# Patient Record
Sex: MALE | Race: WHITE | NOT HISPANIC OR LATINO
[De-identification: names, ages, dates, MRNs, and addresses within clinical notes are randomized per-mention and may not be internally consistent; named-entity substitution may affect disease eponyms.]

---

## 2014-06-26 RX ORDER — DORZOLAMIDE HYDROCHLORIDE TIMOLOL MALEATE 20; 5 MG/ML; MG/ML
1 SOLUTION/ DROPS OPHTHALMIC
Qty: 0 | Refills: 0 | COMMUNITY
Start: 2014-06-26

## 2014-06-26 RX ORDER — LATANOPROST 0.05 MG/ML
1 SOLUTION/ DROPS OPHTHALMIC; TOPICAL
Qty: 0 | Refills: 0 | COMMUNITY
Start: 2014-06-26

## 2017-01-24 ENCOUNTER — NON-APPOINTMENT (OUTPATIENT)
Age: 77
End: 2017-01-24

## 2017-01-24 ENCOUNTER — APPOINTMENT (OUTPATIENT)
Dept: ELECTROPHYSIOLOGY | Facility: CLINIC | Age: 77
End: 2017-01-24

## 2017-01-24 VITALS
SYSTOLIC BLOOD PRESSURE: 133 MMHG | WEIGHT: 152 LBS | DIASTOLIC BLOOD PRESSURE: 71 MMHG | HEIGHT: 69 IN | BODY MASS INDEX: 22.51 KG/M2

## 2017-01-26 ENCOUNTER — INPATIENT (INPATIENT)
Facility: HOSPITAL | Age: 77
LOS: 6 days | Discharge: HOME | End: 2017-02-02
Attending: HOSPITALIST | Admitting: HOSPITALIST
Payer: MEDICARE

## 2017-01-26 DIAGNOSIS — Z95.810 PRESENCE OF AUTOMATIC (IMPLANTABLE) CARDIAC DEFIBRILLATOR: Chronic | ICD-10-CM

## 2017-01-26 PROCEDURE — 71010: CPT | Mod: 26

## 2017-01-26 PROCEDURE — 73660 X-RAY EXAM OF TOE(S): CPT | Mod: 26,RT

## 2017-01-26 PROCEDURE — 93010 ELECTROCARDIOGRAM REPORT: CPT

## 2017-01-26 PROCEDURE — 99285 EMERGENCY DEPT VISIT HI MDM: CPT

## 2017-01-27 PROCEDURE — 73630 X-RAY EXAM OF FOOT: CPT | Mod: 26,LT

## 2017-01-28 VITALS
DIASTOLIC BLOOD PRESSURE: 60 MMHG | TEMPERATURE: 97 F | SYSTOLIC BLOOD PRESSURE: 123 MMHG | OXYGEN SATURATION: 98 % | WEIGHT: 169.76 LBS | RESPIRATION RATE: 18 BRPM | HEART RATE: 70 BPM

## 2017-01-28 LAB
ANION GAP SERPL CALC-SCNC: 9 MMOL/L — SIGNIFICANT CHANGE UP (ref 5–17)
BUN SERPL-MCNC: 35 MG/DL — HIGH (ref 7–23)
CALCIUM SERPL-MCNC: 8.8 MG/DL — SIGNIFICANT CHANGE UP (ref 8.5–10.1)
CHLORIDE SERPL-SCNC: 108 MMOL/L — SIGNIFICANT CHANGE UP (ref 96–108)
CO2 SERPL-SCNC: 22 MMOL/L — SIGNIFICANT CHANGE UP (ref 22–31)
CREAT SERPL-MCNC: 1.83 MG/DL — HIGH (ref 0.5–1.3)
CULTURE RESULTS: SIGNIFICANT CHANGE UP
GLUCOSE SERPL-MCNC: 124 MG/DL — HIGH (ref 70–99)
HCT VFR BLD CALC: 33.8 % — LOW (ref 39–50)
HGB BLD-MCNC: 11.6 G/DL — LOW (ref 13–17)
INR BLD: 2.33 RATIO — HIGH (ref 0.88–1.16)
MCHC RBC-ENTMCNC: 30.9 PG — SIGNIFICANT CHANGE UP (ref 27–34)
MCHC RBC-ENTMCNC: 34.3 GM/DL — SIGNIFICANT CHANGE UP (ref 32–36)
MCV RBC AUTO: 90 FL — SIGNIFICANT CHANGE UP (ref 80–100)
PLATELET # BLD AUTO: 106 K/UL — LOW (ref 150–400)
POTASSIUM SERPL-MCNC: 4.1 MMOL/L — SIGNIFICANT CHANGE UP (ref 3.5–5.3)
POTASSIUM SERPL-SCNC: 4.1 MMOL/L — SIGNIFICANT CHANGE UP (ref 3.5–5.3)
PROTHROM AB SERPL-ACNC: 25.8 SEC — HIGH (ref 10–13.1)
RBC # BLD: 3.75 M/UL — LOW (ref 4.2–5.8)
RBC # FLD: 14.7 % — HIGH (ref 10.3–14.5)
SODIUM SERPL-SCNC: 139 MMOL/L — SIGNIFICANT CHANGE UP (ref 135–145)
SPECIMEN SOURCE: SIGNIFICANT CHANGE UP
WBC # BLD: 9.5 K/UL — SIGNIFICANT CHANGE UP (ref 3.8–10.5)
WBC # FLD AUTO: 9.5 K/UL — SIGNIFICANT CHANGE UP (ref 3.8–10.5)

## 2017-01-28 RX ORDER — ALLOPURINOL 300 MG
100 TABLET ORAL
Qty: 0 | Refills: 0 | Status: DISCONTINUED | OUTPATIENT
Start: 2017-01-28 | End: 2017-02-02

## 2017-01-28 RX ORDER — WARFARIN SODIUM 2.5 MG/1
5 TABLET ORAL
Qty: 0 | Refills: 0 | Status: DISCONTINUED | OUTPATIENT
Start: 2017-01-28 | End: 2017-01-28

## 2017-01-28 RX ORDER — ONDANSETRON 8 MG/1
4 TABLET, FILM COATED ORAL EVERY 6 HOURS
Qty: 0 | Refills: 0 | Status: DISCONTINUED | OUTPATIENT
Start: 2017-01-28 | End: 2017-02-02

## 2017-01-28 RX ORDER — ATORVASTATIN CALCIUM 80 MG/1
5 TABLET, FILM COATED ORAL AT BEDTIME
Qty: 0 | Refills: 0 | Status: DISCONTINUED | OUTPATIENT
Start: 2017-01-28 | End: 2017-02-02

## 2017-01-28 RX ORDER — INSULIN LISPRO 100/ML
VIAL (ML) SUBCUTANEOUS
Qty: 0 | Refills: 0 | Status: DISCONTINUED | OUTPATIENT
Start: 2017-01-28 | End: 2017-02-02

## 2017-01-28 RX ORDER — CHOLECALCIFEROL (VITAMIN D3) 125 MCG
2000 CAPSULE ORAL DAILY
Qty: 0 | Refills: 0 | Status: DISCONTINUED | OUTPATIENT
Start: 2017-01-28 | End: 2017-02-02

## 2017-01-28 RX ORDER — DORZOLAMIDE HYDROCHLORIDE TIMOLOL MALEATE 20; 5 MG/ML; MG/ML
1 SOLUTION/ DROPS OPHTHALMIC EVERY 12 HOURS
Qty: 0 | Refills: 0 | Status: DISCONTINUED | OUTPATIENT
Start: 2017-01-28 | End: 2017-02-02

## 2017-01-28 RX ORDER — DEXTROSE 50 % IN WATER 50 %
25 SYRINGE (ML) INTRAVENOUS ONCE
Qty: 0 | Refills: 0 | Status: DISCONTINUED | OUTPATIENT
Start: 2017-01-28 | End: 2017-02-02

## 2017-01-28 RX ORDER — WARFARIN SODIUM 2.5 MG/1
2.5 TABLET ORAL
Qty: 0 | Refills: 0 | Status: DISCONTINUED | OUTPATIENT
Start: 2017-01-28 | End: 2017-01-28

## 2017-01-28 RX ORDER — ACETAMINOPHEN 500 MG
650 TABLET ORAL EVERY 6 HOURS
Qty: 0 | Refills: 0 | Status: DISCONTINUED | OUTPATIENT
Start: 2017-01-28 | End: 2017-01-28

## 2017-01-28 RX ORDER — CARVEDILOL PHOSPHATE 80 MG/1
25 CAPSULE, EXTENDED RELEASE ORAL EVERY 12 HOURS
Qty: 0 | Refills: 0 | Status: DISCONTINUED | OUTPATIENT
Start: 2017-01-28 | End: 2017-02-02

## 2017-01-28 RX ORDER — OXYCODONE HYDROCHLORIDE 5 MG/1
5 TABLET ORAL EVERY 4 HOURS
Qty: 0 | Refills: 0 | Status: DISCONTINUED | OUTPATIENT
Start: 2017-01-28 | End: 2017-02-02

## 2017-01-28 RX ORDER — DEXTROSE 50 % IN WATER 50 %
12.5 SYRINGE (ML) INTRAVENOUS ONCE
Qty: 0 | Refills: 0 | Status: DISCONTINUED | OUTPATIENT
Start: 2017-01-28 | End: 2017-02-02

## 2017-01-28 RX ORDER — MORPHINE SULFATE 50 MG/1
4 CAPSULE, EXTENDED RELEASE ORAL
Qty: 0 | Refills: 0 | Status: DISCONTINUED | OUTPATIENT
Start: 2017-01-28 | End: 2017-02-02

## 2017-01-28 RX ORDER — ACETYLCYSTEINE 200 MG/ML
1200 VIAL (ML) MISCELLANEOUS EVERY 12 HOURS
Qty: 0 | Refills: 0 | Status: DISCONTINUED | OUTPATIENT
Start: 2017-01-28 | End: 2017-01-28

## 2017-01-28 RX ORDER — MEXILETINE HYDROCHLORIDE 150 MG/1
150 CAPSULE ORAL EVERY 12 HOURS
Qty: 0 | Refills: 0 | Status: DISCONTINUED | OUTPATIENT
Start: 2017-01-28 | End: 2017-02-02

## 2017-01-28 RX ORDER — FUROSEMIDE 40 MG
40 TABLET ORAL DAILY
Qty: 0 | Refills: 0 | Status: DISCONTINUED | OUTPATIENT
Start: 2017-01-28 | End: 2017-01-28

## 2017-01-28 RX ORDER — PANTOPRAZOLE SODIUM 20 MG/1
40 TABLET, DELAYED RELEASE ORAL
Qty: 0 | Refills: 0 | Status: DISCONTINUED | OUTPATIENT
Start: 2017-01-28 | End: 2017-02-02

## 2017-01-28 RX ORDER — LATANOPROST 0.05 MG/ML
1 SOLUTION/ DROPS OPHTHALMIC; TOPICAL AT BEDTIME
Qty: 0 | Refills: 0 | Status: DISCONTINUED | OUTPATIENT
Start: 2017-01-28 | End: 2017-02-02

## 2017-01-28 RX ORDER — VANCOMYCIN HCL 1 G
500 VIAL (EA) INTRAVENOUS EVERY 12 HOURS
Qty: 0 | Refills: 0 | Status: DISCONTINUED | OUTPATIENT
Start: 2017-01-28 | End: 2017-01-28

## 2017-01-28 RX ORDER — LISINOPRIL 2.5 MG/1
5 TABLET ORAL AT BEDTIME
Qty: 0 | Refills: 0 | Status: DISCONTINUED | OUTPATIENT
Start: 2017-01-28 | End: 2017-02-02

## 2017-01-28 RX ORDER — SODIUM CHLORIDE 9 MG/ML
1000 INJECTION, SOLUTION INTRAVENOUS
Qty: 0 | Refills: 0 | Status: DISCONTINUED | OUTPATIENT
Start: 2017-01-28 | End: 2017-02-02

## 2017-01-28 RX ORDER — SODIUM CHLORIDE 9 MG/ML
1000 INJECTION INTRAMUSCULAR; INTRAVENOUS; SUBCUTANEOUS
Qty: 0 | Refills: 0 | Status: DISCONTINUED | OUTPATIENT
Start: 2017-01-31 | End: 2017-01-31

## 2017-01-28 RX ORDER — ACETAMINOPHEN 500 MG
650 TABLET ORAL EVERY 6 HOURS
Qty: 0 | Refills: 0 | Status: DISCONTINUED | OUTPATIENT
Start: 2017-01-28 | End: 2017-02-02

## 2017-01-28 RX ORDER — GLUCAGON INJECTION, SOLUTION 0.5 MG/.1ML
1 INJECTION, SOLUTION SUBCUTANEOUS ONCE
Qty: 0 | Refills: 0 | Status: DISCONTINUED | OUTPATIENT
Start: 2017-01-28 | End: 2017-02-02

## 2017-01-28 RX ORDER — DEXTROSE 50 % IN WATER 50 %
1 SYRINGE (ML) INTRAVENOUS ONCE
Qty: 0 | Refills: 0 | Status: DISCONTINUED | OUTPATIENT
Start: 2017-01-28 | End: 2017-02-02

## 2017-01-28 RX ORDER — PIPERACILLIN AND TAZOBACTAM 4; .5 G/20ML; G/20ML
3.38 INJECTION, POWDER, LYOPHILIZED, FOR SOLUTION INTRAVENOUS EVERY 8 HOURS
Qty: 0 | Refills: 0 | Status: DISCONTINUED | OUTPATIENT
Start: 2017-01-28 | End: 2017-02-01

## 2017-01-28 RX ORDER — ASPIRIN/CALCIUM CARB/MAGNESIUM 324 MG
81 TABLET ORAL DAILY
Qty: 0 | Refills: 0 | Status: DISCONTINUED | OUTPATIENT
Start: 2017-01-28 | End: 2017-02-02

## 2017-01-28 RX ORDER — ACETYLCYSTEINE 200 MG/ML
1200 VIAL (ML) MISCELLANEOUS EVERY 12 HOURS
Qty: 0 | Refills: 0 | Status: DISCONTINUED | OUTPATIENT
Start: 2017-01-30 | End: 2017-02-02

## 2017-01-28 RX ORDER — DIGOXIN 250 MCG
0.12 TABLET ORAL AT BEDTIME
Qty: 0 | Refills: 0 | Status: DISCONTINUED | OUTPATIENT
Start: 2017-01-28 | End: 2017-02-02

## 2017-01-28 RX ORDER — VANCOMYCIN HCL 1 G
500 VIAL (EA) INTRAVENOUS EVERY 12 HOURS
Qty: 0 | Refills: 0 | Status: DISCONTINUED | OUTPATIENT
Start: 2017-01-28 | End: 2017-01-30

## 2017-01-28 RX ADMIN — DORZOLAMIDE HYDROCHLORIDE TIMOLOL MALEATE 1 DROP(S): 20; 5 SOLUTION/ DROPS OPHTHALMIC at 21:18

## 2017-01-28 RX ADMIN — Medication 1: at 18:33

## 2017-01-28 RX ADMIN — PIPERACILLIN AND TAZOBACTAM 25 GRAM(S): 4; .5 INJECTION, POWDER, LYOPHILIZED, FOR SOLUTION INTRAVENOUS at 22:27

## 2017-01-28 RX ADMIN — MEXILETINE HYDROCHLORIDE 150 MILLIGRAM(S): 150 CAPSULE ORAL at 10:30

## 2017-01-28 RX ADMIN — LATANOPROST 1 DROP(S): 0.05 SOLUTION/ DROPS OPHTHALMIC; TOPICAL at 21:18

## 2017-01-28 RX ADMIN — Medication 100 MILLIGRAM(S): at 21:14

## 2017-01-28 RX ADMIN — Medication 100 MILLIGRAM(S): at 21:19

## 2017-01-28 RX ADMIN — MEXILETINE HYDROCHLORIDE 150 MILLIGRAM(S): 150 CAPSULE ORAL at 21:19

## 2017-01-28 RX ADMIN — ATORVASTATIN CALCIUM 5 MILLIGRAM(S): 80 TABLET, FILM COATED ORAL at 21:17

## 2017-01-28 RX ADMIN — Medication 0.12 MILLIGRAM(S): at 21:17

## 2017-01-28 RX ADMIN — Medication 100 MILLIGRAM(S): at 12:00

## 2017-01-28 RX ADMIN — CARVEDILOL PHOSPHATE 25 MILLIGRAM(S): 80 CAPSULE, EXTENDED RELEASE ORAL at 21:17

## 2017-01-28 RX ADMIN — PIPERACILLIN AND TAZOBACTAM 25 GRAM(S): 4; .5 INJECTION, POWDER, LYOPHILIZED, FOR SOLUTION INTRAVENOUS at 15:00

## 2017-01-28 RX ADMIN — Medication 81 MILLIGRAM(S): at 18:32

## 2017-01-28 NOTE — PROGRESS NOTE ADULT - ASSESSMENT
76 y.o. male with PMH VT s/p AICD, AFib on AC, CAD s/p CABG, DM2, gout, glaucoma, HLD, chronic systolic CHF, PVD, CKD 2-3 presents with R big toe infection.   #R big toe infection, cellulitis, r/o OM   -right foot xray neg for fx or dislocation   -f/u cultures, including wound culture   -cont zosyn and vanco   wbc is 9.5  -f/u bone scan   - pt scheduled for angiogram with vascular surgery on tuesday  -vascular surgery input appreciated   -podiatry input appreciated   #AFib on coumadin   -cont coumadin and monitor INR - 2.33  -check digoxin level   #gout   -cont allopurinol   #CAD s/p CABG   #VT s/p AICD   -continue asa, statin, BB, ACEI   #chronic systolic CHF   -echo (6/29/13): EF 35-40% mod-sev MR, mod-sev TR, severe pulm HTN   -stable   -i/o, daily weight   - lasix 40 mg PO   #hyperglycemia, DM2   -fingerstick monitoring and ISS   -A1C 7.2   case discussed with Dr Taylor   DVT Prophylaxis: :: Coumadin 76 y.o. male with PMH VT s/p AICD, AFib on AC, CAD s/p CABG, DM2, gout, glaucoma, HLD, chronic systolic CHF, PVD, CKD 2-3 presents with R big toe infection.   #R big toe infection, cellulitis, r/o OM   -right foot xray neg for fx or dislocation   -f/u cultures, including wound culture   -cont zosyn and vanco   wbc is 9.5  - bone scan negative for osteo  - pt scheduled for angiogram with vascular surgery on tuesday  -vascular surgery input appreciated   -podiatry input appreciated   #AFib on coumadin   -cont coumadin and monitor INR - 2.33  -check digoxin level   #gout   -cont allopurinol   #CAD s/p CABG   #VT s/p AICD   -continue asa, statin, BB, ACEI   #chronic systolic CHF   -echo (6/29/13): EF 35-40% mod-sev MR, mod-sev TR, severe pulm HTN   -stable   -i/o, daily weight   - lasix 40 mg PO   #hyperglycemia, DM2   -fingerstick monitoring and ISS   -A1C 7.2   case discussed with Dr Taylor   DVT Prophylaxis: :: Coumadin

## 2017-01-28 NOTE — CHART NOTE - NSCHARTNOTEFT_GEN_A_CORE
Patient seen and examined with Dr. Beharry and Melissa on the [facility]  Teaching Service. Agree with history, physical, labs and plan which were reviewed in detail.    Patient is a 76 y.o. 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  Right great toe infection / cellulitis rule out osteomyelitis    1/27  -zosyn/vanco  A-fib  -continue coumadin  [place] Systolic CHF  -2-d echo 6/2013 EF 35-40%, severe pulm HTN  type 2 DM  -follow HgA1c  -ISS    1/28  Patient is to have bone scan completed today and angiogram completed on 1/31.  continue vancomycin and zosyn.

## 2017-01-28 NOTE — PROGRESS NOTE ADULT - SUBJECTIVE AND OBJECTIVE BOX
Patient is a 76y old  Male who presents with a chief complaint of right jae infection      HPI:76 y.o. male with PMH VT s/p AICD, AFib on AC, CAD s/p CABG, DM2, gout, glaucoma, HLD, chronic systolic CHF, PVD, CKD 2-3 presents with R big toe infection. Pt states the toe infection has been going on for the past 6 weeks, but recently has been worse. States he took doxycycline with initial improvement, but then back worse. States currently, having oozing discharge from the infection. States occasionally sharp throbbing pain from that toe. Denies any fever, chills, CP, SOB, abd pain. States intact sensation. Pt was sent in for IV antibiotics by podiatrist.   1/27/17: pt seen and examined at bedside. No current pain, fever, N/V. Pt updated of course of action.  1/28/17: Pt seen and examined at bedside. No complains. Scheduled for bone scan today and angiogram with Co2 contrast on tuesday with vascular surgery        T(C): 36.3, Max: 36.3 (01-28 @ 12:14)  HR: 70 (70 - 70)  BP: 123/60 (123/60 - 123/60)  RR: 18 (18 - 18)  SpO2: 98% (98% - 98%)  Wt(kg): --    MEDICATIONS  (STANDING):  allopurinol 100milliGRAM(s) Oral after dinner  aspirin  chewable 81milliGRAM(s) Oral daily  atorvastatin 5milliGRAM(s) Oral at bedtime  carvedilol 25milliGRAM(s) Oral every 12 hours  cholecalciferol 2000Unit(s) Oral daily  digoxin     Tablet 0.125milliGRAM(s) Oral at bedtime  dorzolamide 2%/timolol 0.5% Ophthalmic Solution 1Drop(s) Left EYE every 12 hours  insulin lispro (HumaLOG) corrective regimen sliding scale  SubCutaneous three times a day before meals  dextrose 5%. 1000milliLiter(s) IV Continuous <Continuous>  dextrose 50% Injectable 12.5Gram(s) IV Push once  dextrose 50% Injectable 25Gram(s) IV Push once  dextrose 50% Injectable 25Gram(s) IV Push once  latanoprost 0.005% Ophthalmic Solution 1Drop(s) Left EYE at bedtime  mexiletine 150milliGRAM(s) Oral every 12 hours  pantoprazole    Tablet 40milliGRAM(s) Oral before breakfast  piperacillin/tazobactam IVPB. 3.375Gram(s) IV Intermittent every 8 hours  lisinopril 5milliGRAM(s) Oral at bedtime  vancomycin  IVPB 500milliGRAM(s) IV Intermittent every 12 hours    MEDICATIONS  (PRN):  dextrose Gel 1Dose(s) Oral once PRN Blood Glucose LESS THAN 70 milliGRAM(s)/deciliter  glucagon  Injectable 1milliGRAM(s) IntraMuscular once PRN Glucose LESS THAN 70 milligrams/deciliter  aluminum hydroxide/magnesium hydroxide/simethicone Suspension 30milliLiter(s) Oral every 6 hours PRN Dyspepsia  morphine  - Injectable 4milliGRAM(s) IV Push every 3 hours PRN Severe Pain (7 - 10)  ondansetron Injectable 4milliGRAM(s) IV Push every 6 hours PRN Nausea and/or Vomiting  oxyCODONE IR 5milliGRAM(s) Oral every 4 hours PRN Moderate Pain (4 - 6)  acetaminophen   Tablet. 650milliGRAM(s) Oral every 6 hours PRN Mild Pain (1 - 3)      LABS:                        11.6   9.5   )-----------( 106      ( 28 Jan 2017 05:05 )             33.8     28 Jan 2017 05:05    139    |  108    |  35     ----------------------------<  124    4.1     |  22     |  1.83     Ca    8.8        28 Jan 2017 05:05      PT/INR - ( 28 Jan 2017 05:05 )   PT: 25.8 sec;   INR: 2.33 ratio                       RECENT CULTURES:      RADIOLOGY & ADDITIONAL TESTS:      PHYSICAL EXAM:    GENERAL: NAD, well-groomed, well-developed  HEAD:  Atraumatic, Normocephalic  EYES: EOMI, PERRLA, conjunctiva and sclera clear  ENMT: Moist mucous membranes  NECK: Supple, No JVD  NERVOUS SYSTEM:  Alert & Oriented X3, Motor Strength 5/5 B/L upper and lower extremities; DTRs 2+ intact and symmetric  CHEST/LUNG: Clear to auscultation bilaterally; No rales, rhonchi, wheezing, or rubs  HEART: Regular rate and rhythm; No murmurs, rubs, or gallops  ABDOMEN: Soft, Nontender, Nondistended; Bowel sounds present  EXTREMITIES:  2+ Peripheral Pulses, No clubbing, cyanosis, or edema        allopurinol 100milliGRAM(s) Oral after dinner  aspirin  chewable 81milliGRAM(s) Oral daily  atorvastatin 5milliGRAM(s) Oral at bedtime  carvedilol 25milliGRAM(s) Oral every 12 hours  cholecalciferol 2000Unit(s) Oral daily  digoxin     Tablet 0.125milliGRAM(s) Oral at bedtime  dorzolamide 2%/timolol 0.5% Ophthalmic Solution 1Drop(s) Left EYE every 12 hours  insulin lispro (HumaLOG) corrective regimen sliding scale  SubCutaneous three times a day before meals  dextrose 5%. 1000milliLiter(s) IV Continuous <Continuous>  dextrose Gel 1Dose(s) Oral once PRN  dextrose 50% Injectable 12.5Gram(s) IV Push once  dextrose 50% Injectable 25Gram(s) IV Push once  dextrose 50% Injectable 25Gram(s) IV Push once  glucagon  Injectable 1milliGRAM(s) IntraMuscular once PRN  latanoprost 0.005% Ophthalmic Solution 1Drop(s) Left EYE at bedtime  mexiletine 150milliGRAM(s) Oral every 12 hours  pantoprazole    Tablet 40milliGRAM(s) Oral before breakfast  piperacillin/tazobactam IVPB. 3.375Gram(s) IV Intermittent every 8 hours  aluminum hydroxide/magnesium hydroxide/simethicone Suspension 30milliLiter(s) Oral every 6 hours PRN  lisinopril 5milliGRAM(s) Oral at bedtime  morphine  - Injectable 4milliGRAM(s) IV Push every 3 hours PRN  ondansetron Injectable 4milliGRAM(s) IV Push every 6 hours PRN  oxyCODONE IR 5milliGRAM(s) Oral every 4 hours PRN  vancomycin  IVPB 500milliGRAM(s) IV Intermittent every 12 hours  acetaminophen   Tablet. 650milliGRAM(s) Oral every 6 hours PRN

## 2017-01-29 RX ADMIN — Medication 100 MILLIGRAM(S): at 11:45

## 2017-01-29 RX ADMIN — DORZOLAMIDE HYDROCHLORIDE TIMOLOL MALEATE 1 DROP(S): 20; 5 SOLUTION/ DROPS OPHTHALMIC at 20:15

## 2017-01-29 RX ADMIN — LATANOPROST 1 DROP(S): 0.05 SOLUTION/ DROPS OPHTHALMIC; TOPICAL at 21:59

## 2017-01-29 RX ADMIN — Medication 2000 UNIT(S): at 11:51

## 2017-01-29 RX ADMIN — CARVEDILOL PHOSPHATE 25 MILLIGRAM(S): 80 CAPSULE, EXTENDED RELEASE ORAL at 06:58

## 2017-01-29 RX ADMIN — MEXILETINE HYDROCHLORIDE 150 MILLIGRAM(S): 150 CAPSULE ORAL at 06:59

## 2017-01-29 RX ADMIN — PIPERACILLIN AND TAZOBACTAM 25 GRAM(S): 4; .5 INJECTION, POWDER, LYOPHILIZED, FOR SOLUTION INTRAVENOUS at 22:00

## 2017-01-29 RX ADMIN — PIPERACILLIN AND TAZOBACTAM 25 GRAM(S): 4; .5 INJECTION, POWDER, LYOPHILIZED, FOR SOLUTION INTRAVENOUS at 05:42

## 2017-01-29 RX ADMIN — ATORVASTATIN CALCIUM 5 MILLIGRAM(S): 80 TABLET, FILM COATED ORAL at 21:56

## 2017-01-29 RX ADMIN — Medication 0.12 MILLIGRAM(S): at 21:58

## 2017-01-29 RX ADMIN — Medication 100 MILLIGRAM(S): at 20:13

## 2017-01-29 RX ADMIN — PANTOPRAZOLE SODIUM 40 MILLIGRAM(S): 20 TABLET, DELAYED RELEASE ORAL at 05:41

## 2017-01-29 RX ADMIN — Medication 1: at 11:48

## 2017-01-29 RX ADMIN — Medication 81 MILLIGRAM(S): at 17:23

## 2017-01-29 RX ADMIN — DORZOLAMIDE HYDROCHLORIDE TIMOLOL MALEATE 1 DROP(S): 20; 5 SOLUTION/ DROPS OPHTHALMIC at 05:41

## 2017-01-29 RX ADMIN — CARVEDILOL PHOSPHATE 25 MILLIGRAM(S): 80 CAPSULE, EXTENDED RELEASE ORAL at 20:10

## 2017-01-29 RX ADMIN — PIPERACILLIN AND TAZOBACTAM 25 GRAM(S): 4; .5 INJECTION, POWDER, LYOPHILIZED, FOR SOLUTION INTRAVENOUS at 15:10

## 2017-01-29 RX ADMIN — MEXILETINE HYDROCHLORIDE 150 MILLIGRAM(S): 150 CAPSULE ORAL at 22:08

## 2017-01-29 RX ADMIN — OXYCODONE HYDROCHLORIDE 5 MILLIGRAM(S): 5 TABLET ORAL at 17:23

## 2017-01-29 RX ADMIN — OXYCODONE HYDROCHLORIDE 5 MILLIGRAM(S): 5 TABLET ORAL at 05:42

## 2017-01-29 NOTE — PROGRESS NOTE ADULT - SUBJECTIVE AND OBJECTIVE BOX
Patient is a 76y old  Male who presents with a chief complaint of     HPI:F/U for ischemic R foot. Schedule for R angio on Tues by Dr Barbosa.. No complaints.      Allergies    No Known Allergies    Intolerances        MEDICATIONS  (STANDING):  allopurinol 100milliGRAM(s) Oral after dinner  aspirin  chewable 81milliGRAM(s) Oral daily  atorvastatin 5milliGRAM(s) Oral at bedtime  carvedilol 25milliGRAM(s) Oral every 12 hours  cholecalciferol 2000Unit(s) Oral daily  digoxin     Tablet 0.125milliGRAM(s) Oral at bedtime  dorzolamide 2%/timolol 0.5% Ophthalmic Solution 1Drop(s) Left EYE every 12 hours  insulin lispro (HumaLOG) corrective regimen sliding scale  SubCutaneous three times a day before meals  dextrose 5%. 1000milliLiter(s) IV Continuous <Continuous>  dextrose 50% Injectable 12.5Gram(s) IV Push once  dextrose 50% Injectable 25Gram(s) IV Push once  dextrose 50% Injectable 25Gram(s) IV Push once  latanoprost 0.005% Ophthalmic Solution 1Drop(s) Left EYE at bedtime  mexiletine 150milliGRAM(s) Oral every 12 hours  pantoprazole    Tablet 40milliGRAM(s) Oral before breakfast  piperacillin/tazobactam IVPB. 3.375Gram(s) IV Intermittent every 8 hours  lisinopril 5milliGRAM(s) Oral at bedtime  vancomycin  IVPB 500milliGRAM(s) IV Intermittent every 12 hours    MEDICATIONS  (PRN):  dextrose Gel 1Dose(s) Oral once PRN Blood Glucose LESS THAN 70 milliGRAM(s)/deciliter  glucagon  Injectable 1milliGRAM(s) IntraMuscular once PRN Glucose LESS THAN 70 milligrams/deciliter  aluminum hydroxide/magnesium hydroxide/simethicone Suspension 30milliLiter(s) Oral every 6 hours PRN Dyspepsia  morphine  - Injectable 4milliGRAM(s) IV Push every 3 hours PRN Severe Pain (7 - 10)  ondansetron Injectable 4milliGRAM(s) IV Push every 6 hours PRN Nausea and/or Vomiting  oxyCODONE IR 5milliGRAM(s) Oral every 4 hours PRN Moderate Pain (4 - 6)  acetaminophen   Tablet. 650milliGRAM(s) Oral every 6 hours PRN Mild Pain (1 - 3)      PHYSICAL EXAM:R Foot looks improved. No erythema.           RADIOLOGY    CBC Full  -  ( 28 Jan 2017 05:05 )  WBC Count : 9.5 K/uL  Hemoglobin : 11.6 g/dL  Hematocrit : 33.8 %  Platelet Count - Automated : 106 K/uL  Mean Cell Volume : 90.0 fl  Mean Cell Hemoglobin : 30.9 pg  Mean Cell Hemoglobin Concentration : 34.3 gm/dL  Auto Neutrophil # : x  Auto Lymphocyte # : x  Auto Monocyte # : x  Auto Eosinophil # : x  Auto Basophil # : x  Auto Neutrophil % : x  Auto Lymphocyte % : x  Auto Monocyte % : x  Auto Eosinophil % : x  Auto Basophil % : x      28 Jan 2017 05:05    139    |  108    |  35     ----------------------------<  124    4.1     |  22     |  1.83     Ca    8.8        28 Jan 2017 05:05

## 2017-01-29 NOTE — PROGRESS NOTE ADULT - SUBJECTIVE AND OBJECTIVE BOX
CHIEF COMPLAINT:    SUBJECTIVE: CHIEF COMPLAINT: .76 y.o. male with PMH VT s/p AICD, AFib on AC, CAD s/p CABG, DM2, gout, glaucoma, HLD, chronic systolic CHF,PVD, CKD 2-3 presents with R big toe infection. Pt states the toe infection has been going on for the past 6 weeks, but recently has been worse. States he took doxycycline with initial improvement, but then back worse. States currently, having  oozing discharge from the infection. States occasionally sharp throbbing pain from that toe. Denies any fever, chills, CP,  SOB, abd pain. States intact sensation. Pt was sent in for IV antibiotics by podiatrist.    1/29- Pt notes that he has been comfortable although notes significant amount of pain and discomfort if 1st right toe touched. As per pt he notes bone scan was negative. Denies any fevers or chills.    SUBJECTIVE:     REVIEW OF SYSTEMS:    CONSTITUTIONAL: No weakness, fevers or chills  EYES/ENT: No visual changes;  No vertigo or throat pain   NECK: No pain or stiffness  RESPIRATORY: No cough, wheezing, hemoptysis; No shortness of breath  CARDIOVASCULAR: No chest pain or palpitations  GASTROINTESTINAL: No abdominal or epigastric pain. No nausea, vomiting, or hematemesis; No diarrhea or constipation. No melena or hematochezia.  GENITOURINARY: No dysuria, frequency or hematuria  NEUROLOGICAL: No numbness or weakness  SKIN: No itching, burning, rashes toe discoloration and lesion  All other review of systems is negative unless indicated above    Vital Signs Last 24 Hrs  T(C): 36.4, Max: 36.8 (01-29 @ 08:39)  T(F): 97.5, Max: 98.2 (01-29 @ 08:39)  HR: 79 (70 - 86)  BP: 119/65 (98/56 - 142/66)  BP(mean): --  RR: 17 (16 - 20)  SpO2: 97% (94% - 98%)    I&O's Summary      CAPILLARY BLOOD GLUCOSE  123 (29 Jan 2017 07:50)      PHYSICAL EXAM:    Constitutional: NAD, awake and alert, well-developed  HEENT: PERR, EOMI, Normal Hearing, MMM  Neck: Soft and supple, No LAD, No JVD  Respiratory: Breath sounds are clear bilaterally, No wheezing, rales or rhonchi  Cardiovascular: S1 and S2, regular rate and rhythm, no Murmurs, gallops or rubs  Gastrointestinal: Bowel Sounds present, soft, nontender, nondistended, no guarding, no rebound  Extremities: No peripheral edema  Vascular: 2+ peripheral pulses  Neurological: A/O x 3, no focal deficits  Musculoskeletal: 5/5 strength b/l upper and lower extremities  Skin: 1st toe echymossis and pvd changes with some erythema.  MEDICATIONS:  MEDICATIONS  (STANDING):  allopurinol 100milliGRAM(s) Oral after dinner  aspirin  chewable 81milliGRAM(s) Oral daily  atorvastatin 5milliGRAM(s) Oral at bedtime  carvedilol 25milliGRAM(s) Oral every 12 hours  cholecalciferol 2000Unit(s) Oral daily  digoxin     Tablet 0.125milliGRAM(s) Oral at bedtime  dorzolamide 2%/timolol 0.5% Ophthalmic Solution 1Drop(s) Left EYE every 12 hours  insulin lispro (HumaLOG) corrective regimen sliding scale  SubCutaneous three times a day before meals  dextrose 5%. 1000milliLiter(s) IV Continuous <Continuous>  dextrose 50% Injectable 12.5Gram(s) IV Push once  dextrose 50% Injectable 25Gram(s) IV Push once  dextrose 50% Injectable 25Gram(s) IV Push once  latanoprost 0.005% Ophthalmic Solution 1Drop(s) Left EYE at bedtime  mexiletine 150milliGRAM(s) Oral every 12 hours  pantoprazole    Tablet 40milliGRAM(s) Oral before breakfast  piperacillin/tazobactam IVPB. 3.375Gram(s) IV Intermittent every 8 hours  lisinopril 5milliGRAM(s) Oral at bedtime  vancomycin  IVPB 500milliGRAM(s) IV Intermittent every 12 hours      LABS: All Labs Reviewed:                        11.6   10.4  )-----------( 123      ( 29 Jan 2017 08:02 )             35.0     29 Jan 2017 08:02    138    |  105    |  28     ----------------------------<  118    4.0     |  24     |  1.62     Ca    8.9        29 Jan 2017 08:02      PT/INR - ( 29 Jan 2017 10:24 )   PT: 24.7 sec;   INR: 2.23 ratio               Blood Culture: 01-26 @ 17:10  Organism --  Gram Stain Blood -- Gram Stain --  Specimen Source .Other None  Culture-Blood --    01-26 @ 17:08  Organism --  Gram Stain Blood -- Gram Stain --  Specimen Source .Blood None  Culture-Blood --    01-26 @ 17:00  Organism --  Gram Stain Blood -- Gram Stain --  Specimen Source .Blood None  Culture-Blood --        RADIOLOGY/EKG:    DVT PPX:    ADVANCED DIRECTIVE:    DISPOSITION:    REVIEW OF SYSTEMS:    CONSTITUTIONAL: No weakness, fevers or chills  EYES/ENT: No visual changes;  No vertigo or throat pain   NECK: No pain or stiffness  RESPIRATORY: No cough, wheezing, hemoptysis; No shortness of breath  CARDIOVASCULAR: No chest pain or palpitations  GASTROINTESTINAL: No abdominal or epigastric pain. No nausea, vomiting, or hematemesis; No diarrhea or constipation. No melena or hematochezia.  GENITOURINARY: No dysuria, frequency or hematuria  NEUROLOGICAL: No numbness or weakness  SKIN: No itching, burning, rashes, or lesions   All other review of systems is negative unless indicated above    Vital Signs Last 24 Hrs  T(C): 36.4, Max: 36.8 (01-29 @ 08:39)  T(F): 97.5, Max: 98.2 (01-29 @ 08:39)  HR: 79 (70 - 86)  BP: 119/65 (98/56 - 142/66)  BP(mean): --  RR: 17 (16 - 20)  SpO2: 97% (94% - 98%)    I&O's Summary      CAPILLARY BLOOD GLUCOSE  123 (29 Jan 2017 07:50)      PHYSICAL EXAM:    Constitutional: NAD, awake and alert, well-developed  HEENT: PERR, EOMI, Normal Hearing, MMM  Neck: Soft and supple, No LAD, No JVD  Respiratory: Breath sounds are clear bilaterally, No wheezing, rales or rhonchi  Cardiovascular: S1 and S2, regular rate and rhythm, no Murmurs, gallops or rubs  Gastrointestinal: Bowel Sounds present, soft, nontender, nondistended, no guarding, no rebound  Extremities: No peripheral edema  Vascular: 2+ peripheral pulses  Neurological: A/O x 3, no focal deficits  Musculoskeletal: 5/5 strength b/l upper and lower extremities  Skin: No rashes    MEDICATIONS:  MEDICATIONS  (STANDING):  allopurinol 100milliGRAM(s) Oral after dinner  aspirin  chewable 81milliGRAM(s) Oral daily  atorvastatin 5milliGRAM(s) Oral at bedtime  carvedilol 25milliGRAM(s) Oral every 12 hours  cholecalciferol 2000Unit(s) Oral daily  digoxin     Tablet 0.125milliGRAM(s) Oral at bedtime  dorzolamide 2%/timolol 0.5% Ophthalmic Solution 1Drop(s) Left EYE every 12 hours  insulin lispro (HumaLOG) corrective regimen sliding scale  SubCutaneous three times a day before meals  dextrose 5%. 1000milliLiter(s) IV Continuous <Continuous>  dextrose 50% Injectable 12.5Gram(s) IV Push once  dextrose 50% Injectable 25Gram(s) IV Push once  dextrose 50% Injectable 25Gram(s) IV Push once  latanoprost 0.005% Ophthalmic Solution 1Drop(s) Left EYE at bedtime  mexiletine 150milliGRAM(s) Oral every 12 hours  pantoprazole    Tablet 40milliGRAM(s) Oral before breakfast  piperacillin/tazobactam IVPB. 3.375Gram(s) IV Intermittent every 8 hours  lisinopril 5milliGRAM(s) Oral at bedtime  vancomycin  IVPB 500milliGRAM(s) IV Intermittent every 12 hours      LABS: All Labs Reviewed:                        11.6   10.4  )-----------( 123      ( 29 Jan 2017 08:02 )             35.0     29 Jan 2017 08:02    138    |  105    |  28     ----------------------------<  118    4.0     |  24     |  1.62     Ca    8.9        29 Jan 2017 08:02      PT/INR - ( 29 Jan 2017 10:24 )   PT: 24.7 sec;   INR: 2.23 ratio               Blood Culture: 01-26 @ 17:10  Organism --  Gram Stain Blood -- Gram Stain --  Specimen Source .Other None  Culture-Blood --    01-26 @ 17:08  Organism --  Gram Stain Blood -- Gram Stain --  Specimen Source .Blood None  Culture-Blood --    01-26 @ 17:00  Organism --  Gram Stain Blood -- Gram Stain --  Specimen Source .Blood None  Culture-Blood --        RADIOLOGY/EKG:    DVT PPX:    ADVANCED DIRECTIVE:    DISPOSITION:

## 2017-01-29 NOTE — PROGRESS NOTE ADULT - ASSESSMENT
75 M with CAD s/p CABG, CHF s/p AICD and PPM, Afib on Coumadin, DM, CKD (unknown baseline), PVD, HTN, HLD sent to admitted for IV abx due to infection of right 1st toe.     1) Infection of 1st toe of right foot with poor circulation- Pt to go for Angiogram on Tuesday with Dr. Royal. Continue management by Dr. Gamble. Awaiting official result of bone scan.  -Vancomycin  -zosyn    2)CAD CHF  Lisinopril, carvedilil, ASA 81 digoxin    3)DM HISS    4) GOut- Allopurinol    D/W Dr. Taylor

## 2017-01-29 NOTE — CHART NOTE - NSCHARTNOTEFT_GEN_A_CORE
Patient seen and examined with Dr. Silva and Brandon on the [facility] Medicine  Teaching Service. Agree with history, physical, labs and plan which were reviewed in detail.    Patient is a 76 y.o. 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  Right great toe infection / cellulitis rule out osteomyelitis    1/27  -zosyn/vanco  A-fib  -continue coumadin  [place] Systolic CHF  -2-d echo 6/2013 EF 35-40%, severe pulm HTN  type 2 DM  -follow HgA1c  -ISS    1/28  Patient is to have bone scan completed today and angiogram completed on 1/31.  continue vancomycin and zosyn. Patient seen and examined with Dr. Silva and Brandon on the [facility]  Teaching Service. Agree with history, physical, labs and plan which were reviewed in detail.    Patient is a 76 y.o. 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  Right great toe infection / cellulitis rule out osteomyelitis    1/27  -zosyn/vanco  A-fib  -continue coumadin  [place]ron[place] Systolic CHF  -2-d echo 6/2013 EF 35-40%, severe pulm HTN  type 2 DM  -follow HgA1c  -ISS    1/28  Patient is to have bone scan completed today and angiogram completed on 1/31.  continue vancomycin and zosyn.    1/29    Vancomycin Level, Trough (01.29.17 @ 10:32)    Vancomycin Level, Trough: 8.8: Vancomycin trough levels should be rapidly reached and maintained at  15-20 ug/ml for life threatening MRSA  infections such as sepsis, endocarditis, osteomyelitis and pneumonia. A  first trough level should be drawn  before the 3rd or 4th dose.  Risk8.8:  of renal toxicity is increased for levels >15 ug/ml, in patients on  other nephrotoxic drugs, who are  hemodynamically unstable, have unstable renal function, or are on  Vancomycin therapy for >14 days. Renal function with  creatinine levels should b8.8: e monitored for those patients. ug/mL    Vancomycin Level, Trough (01.29.17 @ 10:32)    Vancomycin Level, Trough: 8.8: Vancomycin trough levels should be rapidly reached and maintained at  15-20 ug/ml for life threatening MRSA  infections such as sepsis, endocarditis, osteomyelitis and pneumonia. A  first trough level should be drawn  before the 3rd or 4th dose.  Risk8.8:  of renal toxicity is increased for levels >15 ug/ml, in patients on  other nephrotoxic drugs, who are  hemodynamically unstable, have unstable renal function, or are on  Vancomycin therapy for >14 days. Renal function with  creatinine levels should b8.8: e monitored for those patients. ug/mL Patient seen and examined with Dr. Silva and Brandon on the [facility]  Teaching Service. Agree with history, physical, labs and plan which were reviewed in detail.    Patient is a 76 y.o. 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  Right great toe infection / cellulitis rule out osteomyelitis    1/27  -zosyn/vanco  A-fib  -continue coumadin  [place]ron[place] Systolic CHF  -2-d echo 6/2013 EF 35-40%, severe pulm HTN  type 2 DM  -follow HgA1c  -ISS    1/28  Patient is to have bone scan completed today and angiogram completed on 1/31.  continue vancomycin and zosyn.    1/29  Patient had bone scan completed yesterday, awaiting official results.  Scheduled for Angiogram on 1/31.     Vancomycin Level, Trough (01.29.17 @ 10:32)    Vancomycin Level, Trough: 8.8: Vancomycin trough levels should be rapidly reached and maintained at  15-20 ug/ml for life threatening MRSA  infections such as sepsis, endocarditis, osteomyelitis and pneumonia. A  first trough level should be drawn  before the 3rd or 4th dose.  Risk8.8:  of renal toxicity is increased for levels >15 ug/ml, in patients on  other nephrotoxic drugs, who are  hemodynamically unstable, have unstable renal function, or are on  Vancomycin therapy for >14 days. Renal function with  creatinine levels should b8.8: e monitored for those patients. ug/mL    Vancomycin Level, Trough (01.29.17 @ 10:32)    Vancomycin Level, Trough: 8.8: Vancomycin trough levels should be rapidly reached and maintained at  15-20 ug/ml for life threatening MRSA  infections such as sepsis, endocarditis, osteomyelitis and pneumonia. A  first trough level should be drawn  before the 3rd or 4th dose.  Risk8.8:  of renal toxicity is increased for levels >15 ug/ml, in patients on  other nephrotoxic drugs, who are  hemodynamically unstable, have unstable renal function, or are on  Vancomycin therapy for >14 days. Renal function with  creatinine levels should b8.8: e monitored for those patients. ug/mL Patient seen and examined with Dr. Silva and Brandon on the [facility]  Teaching Service. Agree with history, physical, labs and plan which were reviewed in detail.    Patient is a 76 y.o. 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  Right great toe infection / cellulitis rule out osteomyelitis    1/27  -zosyn/vanco  A-fib  -continue coumadin  [place]ron[place] Systolic CHF  -2-d echo 6/2013 EF 35-40%, severe pulm HTN  type 2 DM  -follow HgA1c  -ISS    1/28  Patient is to have bone scan completed today and angiogram completed on 1/31.  continue vancomycin and zosyn.    1/29  Patient had bone scan completed yesterday, awaiting official results.  Scheduled for Angiogram on 1/31.     Vancomycin Level, Trough (01.29.17 @ 10:32)    Vancomycin Level, Trough: 8.8:     Complete Blood Count in AM (01.29.17 @ 08:02)    WBC Count: 10.4 K/uL    RBC Count: 3.88 M/uL    Hemoglobin: 11.6 g/dL    Hematocrit: 35.0 %    Mean Cell Volume: 90.2 fl    Mean Cell Hemoglobin: 29.9 pg    Mean Cell Hemoglobin Conc: 33.2 gm/dL    Red Cell Distrib Width: 15.0 %    Platelet Count - Automated: 123 K/uL      Basic Metabolic Panel (01.29.17 @ 08:02)    Sodium, Serum: 138 mmol/L    Potassium, Serum: 4.0 mmol/L    Chloride, Serum: 105 mmol/L    Carbon Dioxide, Serum: 24 mmol/L    Anion Gap, Serum: 9 mmol/L    Blood Urea Nitrogen, Serum: 28 mg/dL    Creatinine, Serum: 1.62 mg/dL    Glucose, Serum: 118 mg/dL    Calcium, Total Serum: 8.9 mg/dL    eGFR if Non : 40: Interpretative comment

## 2017-01-29 NOTE — PROGRESS NOTE ADULT - SUBJECTIVE AND OBJECTIVE BOX
MEDICATIONS  (STANDING):  allopurinol 100milliGRAM(s) Oral after dinner  aspirin  chewable 81milliGRAM(s) Oral daily  atorvastatin 5milliGRAM(s) Oral at bedtime  carvedilol 25milliGRAM(s) Oral every 12 hours  cholecalciferol 2000Unit(s) Oral daily  digoxin     Tablet 0.125milliGRAM(s) Oral at bedtime  dorzolamide 2%/timolol 0.5% Ophthalmic Solution 1Drop(s) Left EYE every 12 hours  insulin lispro (HumaLOG) corrective regimen sliding scale  SubCutaneous three times a day before meals  dextrose 5%. 1000milliLiter(s) IV Continuous <Continuous>  dextrose 50% Injectable 12.5Gram(s) IV Push once  dextrose 50% Injectable 25Gram(s) IV Push once  dextrose 50% Injectable 25Gram(s) IV Push once  latanoprost 0.005% Ophthalmic Solution 1Drop(s) Left EYE at bedtime  mexiletine 150milliGRAM(s) Oral every 12 hours  pantoprazole    Tablet 40milliGRAM(s) Oral before breakfast  piperacillin/tazobactam IVPB. 3.375Gram(s) IV Intermittent every 8 hours  lisinopril 5milliGRAM(s) Oral at bedtime  vancomycin  IVPB 500milliGRAM(s) IV Intermittent every 12 hours    MEDICATIONS  (PRN):  dextrose Gel 1Dose(s) Oral once PRN Blood Glucose LESS THAN 70 milliGRAM(s)/deciliter  glucagon  Injectable 1milliGRAM(s) IntraMuscular once PRN Glucose LESS THAN 70 milligrams/deciliter  aluminum hydroxide/magnesium hydroxide/simethicone Suspension 30milliLiter(s) Oral every 6 hours PRN Dyspepsia  morphine  - Injectable 4milliGRAM(s) IV Push every 3 hours PRN Severe Pain (7 - 10)  ondansetron Injectable 4milliGRAM(s) IV Push every 6 hours PRN Nausea and/or Vomiting  oxyCODONE IR 5milliGRAM(s) Oral every 4 hours PRN Moderate Pain (4 - 6)  acetaminophen   Tablet. 650milliGRAM(s) Oral every 6 hours PRN Mild Pain (1 - 3)      Allergies    No Known Allergies    Intolerances        Flatus: [ ] YES [ ] NO             Bowel Movement: [ ] YES [ ] NO  Pain (0-10):            Pain Control Adequate: [ ] YES [ ] NO  Nausea: [ ] YES [ ] NO            Vomiting: [ ] YES [ ] NO  Diarrhea: [ ] YES [ ] NO         Constipation: [ ] YES [ ] NO     Chest Pain: [ ] YES [ ] NO    SOB:  [ ] YES [ ] NO    Vital Signs Last 24 Hrs  T(C): 36.4, Max: 36.4 (01-28 @ 21:09)  T(F): 97.5, Max: 97.5 (01-28 @ 21:09)  HR: 86 (70 - 86)  BP: 120/57 (120/57 - 142/66)  BP(mean): --  RR: 20 (18 - 20)  SpO2: 94% (94% - 98%)    I&O's Summary      Physical Exam:  General: NAD, resting comfortably  Pulmonary: normal resp effort, CTA-B  Cardiovascular: NSR  Abdominal: soft, NT/ND  Extremities: WWP, normal strength  Neuro: A/O x 3, CNs II-XII grossly intact, normal motor/sensation, no focal deficits  LABS:                        11.6   10.4  )-----------( 123      ( 29 Jan 2017 08:02 )             35.0     29 Jan 2017 08:02    138    |  105    |  28     ----------------------------<  118    4.0     |  24     |  1.62     Ca    8.9        29 Jan 2017 08:02      PT/INR - ( 28 Jan 2017 05:05 )   PT: 25.8 sec;   INR: 2.33 ratio               CAPILLARY BLOOD GLUCOSE  123 (29 Jan 2017 07:50)      RADIOLOGY & ADDITIONAL TESTS:

## 2017-01-30 DIAGNOSIS — L03.119 CELLULITIS OF UNSPECIFIED PART OF LIMB: ICD-10-CM

## 2017-01-30 DIAGNOSIS — R73.9 HYPERGLYCEMIA, UNSPECIFIED: ICD-10-CM

## 2017-01-30 DIAGNOSIS — L08.9 LOCAL INFECTION OF THE SKIN AND SUBCUTANEOUS TISSUE, UNSPECIFIED: ICD-10-CM

## 2017-01-30 DIAGNOSIS — I73.9 PERIPHERAL VASCULAR DISEASE, UNSPECIFIED: ICD-10-CM

## 2017-01-30 DIAGNOSIS — N28.9 DISORDER OF KIDNEY AND URETER, UNSPECIFIED: ICD-10-CM

## 2017-01-30 LAB
ANION GAP SERPL CALC-SCNC: 7 MMOL/L — SIGNIFICANT CHANGE UP (ref 5–17)
APTT BLD: 36.4 SEC — SIGNIFICANT CHANGE UP (ref 27.5–37.4)
BUN SERPL-MCNC: 27 MG/DL — HIGH (ref 7–23)
CALCIUM SERPL-MCNC: 8.8 MG/DL — SIGNIFICANT CHANGE UP (ref 8.5–10.1)
CHLORIDE SERPL-SCNC: 108 MMOL/L — SIGNIFICANT CHANGE UP (ref 96–108)
CO2 SERPL-SCNC: 24 MMOL/L — SIGNIFICANT CHANGE UP (ref 22–31)
CREAT SERPL-MCNC: 1.87 MG/DL — HIGH (ref 0.5–1.3)
GLUCOSE SERPL-MCNC: 148 MG/DL — HIGH (ref 70–99)
HCT VFR BLD CALC: 34 % — LOW (ref 39–50)
HGB BLD-MCNC: 11.1 G/DL — LOW (ref 13–17)
INR BLD: 1.57 RATIO — HIGH (ref 0.88–1.16)
MCHC RBC-ENTMCNC: 29.7 PG — SIGNIFICANT CHANGE UP (ref 27–34)
MCHC RBC-ENTMCNC: 32.8 GM/DL — SIGNIFICANT CHANGE UP (ref 32–36)
MCV RBC AUTO: 90.6 FL — SIGNIFICANT CHANGE UP (ref 80–100)
PLATELET # BLD AUTO: 142 K/UL — LOW (ref 150–400)
POTASSIUM SERPL-MCNC: 4.1 MMOL/L — SIGNIFICANT CHANGE UP (ref 3.5–5.3)
POTASSIUM SERPL-SCNC: 4.1 MMOL/L — SIGNIFICANT CHANGE UP (ref 3.5–5.3)
PROTHROM AB SERPL-ACNC: 17.3 SEC — HIGH (ref 10–13.1)
RBC # BLD: 3.75 M/UL — LOW (ref 4.2–5.8)
RBC # FLD: 15 % — HIGH (ref 10.3–14.5)
SODIUM SERPL-SCNC: 139 MMOL/L — SIGNIFICANT CHANGE UP (ref 135–145)
WBC # BLD: 9 K/UL — SIGNIFICANT CHANGE UP (ref 3.8–10.5)
WBC # FLD AUTO: 9 K/UL — SIGNIFICANT CHANGE UP (ref 3.8–10.5)

## 2017-01-30 RX ORDER — VANCOMYCIN HCL 1 G
750 VIAL (EA) INTRAVENOUS EVERY 12 HOURS
Qty: 0 | Refills: 0 | Status: DISCONTINUED | OUTPATIENT
Start: 2017-01-30 | End: 2017-01-31

## 2017-01-30 RX ORDER — PHYTONADIONE (VIT K1) 5 MG
5 TABLET ORAL ONCE
Qty: 0 | Refills: 0 | Status: COMPLETED | OUTPATIENT
Start: 2017-01-30 | End: 2017-01-30

## 2017-01-30 RX ADMIN — Medication 101 MILLIGRAM(S): at 16:27

## 2017-01-30 RX ADMIN — MEXILETINE HYDROCHLORIDE 150 MILLIGRAM(S): 150 CAPSULE ORAL at 17:57

## 2017-01-30 RX ADMIN — OXYCODONE HYDROCHLORIDE 5 MILLIGRAM(S): 5 TABLET ORAL at 20:11

## 2017-01-30 RX ADMIN — LISINOPRIL 5 MILLIGRAM(S): 2.5 TABLET ORAL at 22:34

## 2017-01-30 RX ADMIN — Medication 0.12 MILLIGRAM(S): at 22:33

## 2017-01-30 RX ADMIN — ATORVASTATIN CALCIUM 5 MILLIGRAM(S): 80 TABLET, FILM COATED ORAL at 22:32

## 2017-01-30 RX ADMIN — Medication 150 MILLIGRAM(S): at 19:57

## 2017-01-30 RX ADMIN — Medication 1: at 09:14

## 2017-01-30 RX ADMIN — Medication 1200 MILLIGRAM(S): at 17:56

## 2017-01-30 RX ADMIN — Medication 100 MILLIGRAM(S): at 05:33

## 2017-01-30 RX ADMIN — PIPERACILLIN AND TAZOBACTAM 25 GRAM(S): 4; .5 INJECTION, POWDER, LYOPHILIZED, FOR SOLUTION INTRAVENOUS at 22:10

## 2017-01-30 RX ADMIN — Medication 1200 MILLIGRAM(S): at 06:55

## 2017-01-30 RX ADMIN — Medication 100 MILLIGRAM(S): at 17:56

## 2017-01-30 RX ADMIN — DORZOLAMIDE HYDROCHLORIDE TIMOLOL MALEATE 1 DROP(S): 20; 5 SOLUTION/ DROPS OPHTHALMIC at 17:58

## 2017-01-30 RX ADMIN — OXYCODONE HYDROCHLORIDE 5 MILLIGRAM(S): 5 TABLET ORAL at 16:29

## 2017-01-30 RX ADMIN — Medication 81 MILLIGRAM(S): at 12:09

## 2017-01-30 RX ADMIN — MEXILETINE HYDROCHLORIDE 150 MILLIGRAM(S): 150 CAPSULE ORAL at 05:32

## 2017-01-30 RX ADMIN — LATANOPROST 1 DROP(S): 0.05 SOLUTION/ DROPS OPHTHALMIC; TOPICAL at 22:32

## 2017-01-30 RX ADMIN — CARVEDILOL PHOSPHATE 25 MILLIGRAM(S): 80 CAPSULE, EXTENDED RELEASE ORAL at 05:40

## 2017-01-30 RX ADMIN — PANTOPRAZOLE SODIUM 40 MILLIGRAM(S): 20 TABLET, DELAYED RELEASE ORAL at 05:33

## 2017-01-30 RX ADMIN — Medication 2000 UNIT(S): at 12:09

## 2017-01-30 RX ADMIN — CARVEDILOL PHOSPHATE 25 MILLIGRAM(S): 80 CAPSULE, EXTENDED RELEASE ORAL at 17:56

## 2017-01-30 RX ADMIN — DORZOLAMIDE HYDROCHLORIDE TIMOLOL MALEATE 1 DROP(S): 20; 5 SOLUTION/ DROPS OPHTHALMIC at 05:32

## 2017-01-30 RX ADMIN — PIPERACILLIN AND TAZOBACTAM 25 GRAM(S): 4; .5 INJECTION, POWDER, LYOPHILIZED, FOR SOLUTION INTRAVENOUS at 06:55

## 2017-01-30 RX ADMIN — PIPERACILLIN AND TAZOBACTAM 25 GRAM(S): 4; .5 INJECTION, POWDER, LYOPHILIZED, FOR SOLUTION INTRAVENOUS at 15:12

## 2017-01-30 RX ADMIN — OXYCODONE HYDROCHLORIDE 5 MILLIGRAM(S): 5 TABLET ORAL at 12:07

## 2017-01-30 NOTE — PROGRESS NOTE ADULT - SUBJECTIVE AND OBJECTIVE BOX
Patient is a 76y old  Male who presents with a chief complaint of       HPI: 76 y.o. male with PMH VT s/p AICD, AFib on AC, CAD s/p CABG, DM2, gout, glaucoma, HLD, chronic systolic CHF,PVD, CKD 2-3 presents with R big toe infection. Pt states the toe infection has been going on for the past 6 weeks, but recently has been worse. States he took doxycycline with initial improvement, but then back worse. States currently, having  oozing discharge from the infection. States occasionally sharp throbbing pain from that toe. Denies any fever, chills, CP,  SOB, abd pain. States intact sensation. Pt was sent in for IV antibiotics by podiatrist.    1/29- Pt notes that he has been comfortable although notes significant amount of pain and discomfort if 1st right toe touched. As per pt he notes bone scan was negative. Denies any fevers or chills.    1/30/17: pt seen and examined at bedside. States he feels much better and redness on right foot is improving. Scheduled for Angio in the morning. No complaints or concerns at this time.      SUBJECTIVE & OBJECTIVE: Pt seen and examined at bedside.     T(C): 36.8, Max: 36.8 (01-30 @ 17:09)  HR: 69 (68 - 76)  BP: 132/53 (109/64 - 132/53)  RR: 18 (16 - 18)  SpO2: 96% (96% - 98%)  Wt(kg): --    MEDICATIONS  (STANDING):  allopurinol 100milliGRAM(s) Oral after dinner  aspirin  chewable 81milliGRAM(s) Oral daily  atorvastatin 5milliGRAM(s) Oral at bedtime  carvedilol 25milliGRAM(s) Oral every 12 hours  cholecalciferol 2000Unit(s) Oral daily  digoxin     Tablet 0.125milliGRAM(s) Oral at bedtime  dorzolamide 2%/timolol 0.5% Ophthalmic Solution 1Drop(s) Left EYE every 12 hours  insulin lispro (HumaLOG) corrective regimen sliding scale  SubCutaneous three times a day before meals  dextrose 5%. 1000milliLiter(s) IV Continuous <Continuous>  dextrose 50% Injectable 12.5Gram(s) IV Push once  dextrose 50% Injectable 25Gram(s) IV Push once  dextrose 50% Injectable 25Gram(s) IV Push once  latanoprost 0.005% Ophthalmic Solution 1Drop(s) Left EYE at bedtime  mexiletine 150milliGRAM(s) Oral every 12 hours  pantoprazole    Tablet 40milliGRAM(s) Oral before breakfast  piperacillin/tazobactam IVPB. 3.375Gram(s) IV Intermittent every 8 hours  lisinopril 5milliGRAM(s) Oral at bedtime  acetylcysteine  Oral Solution 1200milliGRAM(s) Oral every 12 hours  vancomycin  IVPB 750milliGRAM(s) IV Intermittent every 12 hours    MEDICATIONS  (PRN):  dextrose Gel 1Dose(s) Oral once PRN Blood Glucose LESS THAN 70 milliGRAM(s)/deciliter  glucagon  Injectable 1milliGRAM(s) IntraMuscular once PRN Glucose LESS THAN 70 milligrams/deciliter  aluminum hydroxide/magnesium hydroxide/simethicone Suspension 30milliLiter(s) Oral every 6 hours PRN Dyspepsia  morphine  - Injectable 4milliGRAM(s) IV Push every 3 hours PRN Severe Pain (7 - 10)  ondansetron Injectable 4milliGRAM(s) IV Push every 6 hours PRN Nausea and/or Vomiting  oxyCODONE IR 5milliGRAM(s) Oral every 4 hours PRN Moderate Pain (4 - 6)  acetaminophen   Tablet. 650milliGRAM(s) Oral every 6 hours PRN Mild Pain (1 - 3)      LABS:                        11.1   8.5   )-----------( 114      ( 30 Jan 2017 06:40 )             33.9     30 Jan 2017 06:40    140    |  106    |  25     ----------------------------<  122    3.9     |  24     |  1.75     Ca    8.6        30 Jan 2017 06:40      PT/INR - ( 30 Jan 2017 15:41 )   PT: 17.3 sec;   INR: 1.57 ratio         PTT - ( 30 Jan 2017 15:41 )  PTT:36.4 sec      CAPILLARY BLOOD GLUCOSE  185 (30 Jan 2017 09:20)      CAPILLARY BLOOD GLUCOSE  185 (30 Jan 2017 09:20)    CAPILLARY BLOOD GLUCOSE  185 (30 Jan 2017 09:20)            RECENT CULTURES:      RADIOLOGY & ADDITIONAL TESTS:      PHYSICAL EXAM:    GENERAL: NAD, well-groomed, well-developed  HEAD:  Atraumatic, Normocephalic  EYES: EOMI, PERRLA, conjunctiva and sclera clear  ENMT: Moist mucous membranes  NECK: Supple, No JVD  NERVOUS SYSTEM:  Alert & Oriented X3, Motor Strength 5/5 B/L upper and lower extremities; DTRs 2+ intact and symmetric  CHEST/LUNG: Clear to auscultation bilaterally; No rales, rhonchi, wheezing, or rubs  HEART: Regular rate and rhythm; No murmurs, rubs, or gallops  ABDOMEN: Soft, Nontender, Nondistended; Bowel sounds present  EXTREMITIES:  2+ Peripheral Pulses, No clubbing, cyanosis, or edema. Right foot has mild erythema and trophic changes but improvement noted on abx    Daily     Daily       allopurinol 100milliGRAM(s) Oral after dinner  aspirin  chewable 81milliGRAM(s) Oral daily  atorvastatin 5milliGRAM(s) Oral at bedtime  carvedilol 25milliGRAM(s) Oral every 12 hours  cholecalciferol 2000Unit(s) Oral daily  digoxin     Tablet 0.125milliGRAM(s) Oral at bedtime  dorzolamide 2%/timolol 0.5% Ophthalmic Solution 1Drop(s) Left EYE every 12 hours  insulin lispro (HumaLOG) corrective regimen sliding scale  SubCutaneous three times a day before meals  dextrose 5%. 1000milliLiter(s) IV Continuous <Continuous>  dextrose Gel 1Dose(s) Oral once PRN  dextrose 50% Injectable 12.5Gram(s) IV Push once  dextrose 50% Injectable 25Gram(s) IV Push once  dextrose 50% Injectable 25Gram(s) IV Push once  glucagon  Injectable 1milliGRAM(s) IntraMuscular once PRN  latanoprost 0.005% Ophthalmic Solution 1Drop(s) Left EYE at bedtime  mexiletine 150milliGRAM(s) Oral every 12 hours  pantoprazole    Tablet 40milliGRAM(s) Oral before breakfast  piperacillin/tazobactam IVPB. 3.375Gram(s) IV Intermittent every 8 hours  aluminum hydroxide/magnesium hydroxide/simethicone Suspension 30milliLiter(s) Oral every 6 hours PRN  lisinopril 5milliGRAM(s) Oral at bedtime  morphine  - Injectable 4milliGRAM(s) IV Push every 3 hours PRN  ondansetron Injectable 4milliGRAM(s) IV Push every 6 hours PRN  oxyCODONE IR 5milliGRAM(s) Oral every 4 hours PRN  acetaminophen   Tablet. 650milliGRAM(s) Oral every 6 hours PRN  acetylcysteine  Oral Solution 1200milliGRAM(s) Oral every 12 hours  vancomycin  IVPB 750milliGRAM(s) IV Intermittent every 12 hours              DVT/GI ppx  Discussed with pt @ bedside

## 2017-01-30 NOTE — PROGRESS NOTE ADULT - SUBJECTIVE AND OBJECTIVE BOX
Patient is a 76y old  Male who presents with a chief complaint of   HPI:  77 y/o male with h/o VT s/p AICD, A.Fib on AC, CAD s/p CABG, DM type 2, gout, glaucoma, HLD, chronic systolic CHF,  PVD, CKD 2-3 was admitted on 1/26 right great toe infection. Pt states the toe redness has been going on for the past  6 weeks, but recently has been worse. States he took doxycycline with initial improvement, but then back worse. States  currently, having oozing discharge from the infection. States occasionally sharp throbbing pain from that toe. Denies any  fever, chills, CP, SOB, abd pain. States intact sensation. Pt was sent in for IV antibiotics by podiatrist.    Feels better  His left foot pain is improving  No fever or chills    MEDICATIONS  (STANDING):  allopurinol 100milliGRAM(s) Oral after dinner  aspirin  chewable 81milliGRAM(s) Oral daily  atorvastatin 5milliGRAM(s) Oral at bedtime  carvedilol 25milliGRAM(s) Oral every 12 hours  cholecalciferol 2000Unit(s) Oral daily  digoxin     Tablet 0.125milliGRAM(s) Oral at bedtime  dorzolamide 2%/timolol 0.5% Ophthalmic Solution 1Drop(s) Left EYE every 12 hours  insulin lispro (HumaLOG) corrective regimen sliding scale  SubCutaneous three times a day before meals  dextrose 5%. 1000milliLiter(s) IV Continuous <Continuous>  dextrose 50% Injectable 12.5Gram(s) IV Push once  dextrose 50% Injectable 25Gram(s) IV Push once  dextrose 50% Injectable 25Gram(s) IV Push once  latanoprost 0.005% Ophthalmic Solution 1Drop(s) Left EYE at bedtime  mexiletine 150milliGRAM(s) Oral every 12 hours  pantoprazole    Tablet 40milliGRAM(s) Oral before breakfast  piperacillin/tazobactam IVPB. 3.375Gram(s) IV Intermittent every 8 hours  lisinopril 5milliGRAM(s) Oral at bedtime  vancomycin  IVPB 500milliGRAM(s) IV Intermittent every 12 hours  acetylcysteine  Oral Solution 1200milliGRAM(s) Oral every 12 hours  phytonadione  IVPB 5milliGRAM(s) IV Intermittent once    MEDICATIONS  (PRN):  dextrose Gel 1Dose(s) Oral once PRN Blood Glucose LESS THAN 70 milliGRAM(s)/deciliter  glucagon  Injectable 1milliGRAM(s) IntraMuscular once PRN Glucose LESS THAN 70 milligrams/deciliter  aluminum hydroxide/magnesium hydroxide/simethicone Suspension 30milliLiter(s) Oral every 6 hours PRN Dyspepsia  morphine  - Injectable 4milliGRAM(s) IV Push every 3 hours PRN Severe Pain (7 - 10)  ondansetron Injectable 4milliGRAM(s) IV Push every 6 hours PRN Nausea and/or Vomiting  oxyCODONE IR 5milliGRAM(s) Oral every 4 hours PRN Moderate Pain (4 - 6)  acetaminophen   Tablet. 650milliGRAM(s) Oral every 6 hours PRN Mild Pain (1 - 3)    Allergies    No Known Allergies    Intolerances      Social: no smoking, no alcohol, no illegal drugs; no recent travel, no exposure to TB  FAMILY HISTORY:    ROS: the patient denies fever, no chills, no HA, no dizziness, no sore throat, no blurry vision, no CP, no palpitations, no SOB, no cough, no abdominal pain, no diarrhea, no N/V, no dysuria, no leg pain, no claudication, no rash, no joint aches, no rectal pain or bleeding, no night sweats  Vital Signs Last 24 Hrs  T(C): 36.6, Max: 36.6 (01-30 @ 09:14)  T(F): 97.9, Max: 97.9 (01-30 @ 09:14)  HR: 70 (68 - 79)  BP: 109/64 (109/64 - 124/56)  BP(mean): --  RR: 16 (16 - 17)  SpO2: 98% (97% - 98%)  Daily     Daily   Constitutional: frail looking  HEENT: NC/AT, EOMI, PERRLA  Neck: supple  Back: no tenderness  Respiratory: clear  Cardiovascular: S1S2 regular, no murmurs  Abdomen: soft, not tender, not distended, positive BS  Genitourinary: deferred  Rectal: deferred  Musculoskeletal: no muscle tenderness, no joint swelling or tenderness  Extremities: no pedal edema; left foot erythema and edema is improved; left great toe dry superficial ulcer; toe is colder  Neurological: AxOx3, moving all extremities, no focal deficits  Skin: no rashes                          11.1   8.5   )-----------( 114      ( 30 Jan 2017 06:40 )             33.9     30 Jan 2017 06:40    140    |  106    |  25     ----------------------------<  122    3.9     |  24     |  1.75     Ca    8.6        30 Jan 2017 06:40    Vancomycin Level, Trough (01.29.17 @ 10:32)    Vancomycin Level, Trough: 8.8: Vancomycin trough levels should be rapidly reached and maintained at  15-20 ug/ml for life threatening MRSA  infections such as sepsis, endocarditis, osteomyelitis and pneumonia. A  first trough level should be drawn  before the 3rd or 4th dose.  Risk8.8:  of renal toxicity is increased for levels >15 ug/ml, in patients on  other nephrotoxic drugs, who are  hemodynamically unstable, have unstable renal function, or are on  Vancomycin therapy for >14 days. Renal function with  creatinine levels should b8.8: e monitored for those patients. ug/mL            Radiology:  bone scan reported with no evidence of OM (spoken with nuclear medicine RN Jaycee)

## 2017-01-30 NOTE — CHART NOTE - NSCHARTNOTEFT_GEN_A_CORE
Patient seen and examined with Dr. Silva and Brandon on the [facility]  Teaching Service. Agree with history, physical, labs and plan which were reviewed in detail.    Patient is a 76 y.o. 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  Right great toe infection / cellulitis rule out osteomyelitis    1/27  -zosyn/vanco  A-fib  -continue coumadin  [place] Systolic CHF  -2-d echo 6/2013 EF 35-40%, severe pulm HTN  type 2 DM  -follow HgA1c  -ISS    1/28  Patient is to have bone scan completed today and angiogram completed on 1/31.  continue vancomycin and zosyn.    1/29  Patient had bone scan completed yesterday, awaiting official results.  Scheduled for Angiogram on 1/31.     Vancomycin Level, Trough (01.29.17 @ 10:32)    Vancomycin Level, Trough: 8.8:     Complete Blood Count in AM (01.29.17 @ 08:02)    WBC Count: 10.4 K/uL    RBC Count: 3.88 M/uL    Hemoglobin: 11.6 g/dL    Hematocrit: 35.0 %    Mean Cell Volume: 90.2 fl    Mean Cell Hemoglobin: 29.9 pg    Mean Cell Hemoglobin Conc: 33.2 gm/dL    Red Cell Distrib Width: 15.0 %    Platelet Count - Automated: 123 K/uL      Basic Metabolic Panel (01.29.17 @ 08:02)    Sodium, Serum: 138 mmol/L    Potassium, Serum: 4.0 mmol/L    Chloride, Serum: 105 mmol/L    Carbon Dioxide, Serum: 24 mmol/L    Anion Gap, Serum: 9 mmol/L    Blood Urea Nitrogen, Serum: 28 mg/dL    Creatinine, Serum: 1.62 mg/dL    Glucose, Serum: 118 mg/dL    Calcium, Total Serum: 8.9 mg/dL    eGFR if Non : 40: Interpretative comment    1/30  Patient resting comfortably in bed.  Will give vitamin K 5 mg po today in preparation for procedure tomorrow.  Angiogram scheduled for tomorrow. Vancomycin dose increased.

## 2017-01-30 NOTE — PROGRESS NOTE ADULT - ATTENDING COMMENTS
75 y/o male with h/o VT s/p AICD, A.Fib on AC, CAD s/p CABG, DM type 2, gout, glaucoma, HLD, chronic systolic CHF,  PVD, CKD 2-3 was admitted on 1/26 right great toe infection. Pt states the toe redness has been going on for the past  6 weeks, but recently has been worse. States he took doxycycline with initial improvement, but then back worse. States  currently, having oozing discharge from the infection. States occasionally sharp throbbing pain from that toe. Denies any  fever, chills, CP, SOB, abd pain. States intact sensation. Pt was sent in for IV antibiotics by podiatrist.  1. Right halux cellulitis with dry ulcer. CRF stage 3.  -erythema is improving  -f/u BC x 2  -on vancomycin 500 mg IV q12h and zosyn 3.375 mg IV q8h (infused over 4h) # 4  -toleating abx well so far  -vancomycin level is low; increase vancomycin dose to 750 mg IV q12h  -vascular evaluation appreciated: for surgery in AM  -monitor BMP  -continue IV abx coverage  -monitor temps  -f/u CBC  -supportive care  2. Other issues: VT s/p AICD, A.Fib on AC, CAD s/p CABG, DM type 2, gout, glaucoma, HLD, chronic systolic CHF,  PVD, CKD 2-3  -care per medicine

## 2017-01-30 NOTE — PROGRESS NOTE ADULT - ASSESSMENT
75 M with CAD s/p CABG, CHF s/p AICD and PPM, Afib on Coumadin, DM, CKD (unknown baseline), PVD, HTN, HLD sent to admitted for IV abx due to infection of right 1st toe.     #) Cellulitis of 1st toe of right foot   - Pt to go for Angiogram on Tuesday with Dr. Royal. Continue management by Dr. Gamble. Awaiting official result of bone scan.  - increase Vancomycin to 750 mg IV and continue zosyn  - bone scan neg for osteo  -Continue management by Dr. Gamble. Awaiting official result of bone scan.    #PVD  - pt scheduled for angio in the morning with  Dr Royal    #)CAD CHF  -Lisinopril, carvedilil, ASA 81 digoxin    #)DM   continue ISS and BGM    #) GOut  - Allopurinol    D/W Dr. Taylor

## 2017-01-30 NOTE — PROGRESS NOTE ADULT - SUBJECTIVE AND OBJECTIVE BOX
Patient is a 76y old  Male who presents with a chief complaint of pain R foot.    HPI:      Allergies    No Known Allergies    Intolerances        MEDICATIONS  (STANDING):  allopurinol 100milliGRAM(s) Oral after dinner  aspirin  chewable 81milliGRAM(s) Oral daily  atorvastatin 5milliGRAM(s) Oral at bedtime  carvedilol 25milliGRAM(s) Oral every 12 hours  cholecalciferol 2000Unit(s) Oral daily  digoxin     Tablet 0.125milliGRAM(s) Oral at bedtime  dorzolamide 2%/timolol 0.5% Ophthalmic Solution 1Drop(s) Left EYE every 12 hours  insulin lispro (HumaLOG) corrective regimen sliding scale  SubCutaneous three times a day before meals  dextrose 5%. 1000milliLiter(s) IV Continuous <Continuous>  dextrose 50% Injectable 12.5Gram(s) IV Push once  dextrose 50% Injectable 25Gram(s) IV Push once  dextrose 50% Injectable 25Gram(s) IV Push once  latanoprost 0.005% Ophthalmic Solution 1Drop(s) Left EYE at bedtime  mexiletine 150milliGRAM(s) Oral every 12 hours  pantoprazole    Tablet 40milliGRAM(s) Oral before breakfast  piperacillin/tazobactam IVPB. 3.375Gram(s) IV Intermittent every 8 hours  lisinopril 5milliGRAM(s) Oral at bedtime  vancomycin  IVPB 500milliGRAM(s) IV Intermittent every 12 hours  acetylcysteine  Oral Solution 1200milliGRAM(s) Oral every 12 hours    MEDICATIONS  (PRN):  dextrose Gel 1Dose(s) Oral once PRN Blood Glucose LESS THAN 70 milliGRAM(s)/deciliter  glucagon  Injectable 1milliGRAM(s) IntraMuscular once PRN Glucose LESS THAN 70 milligrams/deciliter  aluminum hydroxide/magnesium hydroxide/simethicone Suspension 30milliLiter(s) Oral every 6 hours PRN Dyspepsia  morphine  - Injectable 4milliGRAM(s) IV Push every 3 hours PRN Severe Pain (7 - 10)  ondansetron Injectable 4milliGRAM(s) IV Push every 6 hours PRN Nausea and/or Vomiting  oxyCODONE IR 5milliGRAM(s) Oral every 4 hours PRN Moderate Pain (4 - 6)  acetaminophen   Tablet. 650milliGRAM(s) Oral every 6 hours PRN Mild Pain (1 - 3)        RADIOLOGY    CBC Full  -  ( 29 Jan 2017 08:02 )  WBC Count : 10.4 K/uL  Hemoglobin : 11.6 g/dL  Hematocrit : 35.0 %  Platelet Count - Automated : 123 K/uL  Mean Cell Volume : 90.2 fl  Mean Cell Hemoglobin : 29.9 pg  Mean Cell Hemoglobin Concentration : 33.2 gm/dL  Auto Neutrophil # : x  Auto Lymphocyte # : x  Auto Monocyte # : x  Auto Eosinophil # : x  Auto Basophil # : x  Auto Neutrophil % : x  Auto Lymphocyte % : x  Auto Monocyte % : x  Auto Eosinophil % : x  Auto Basophil % : x      29 Jan 2017 08:02    138    |  105    |  28     ----------------------------<  118    4.0     |  24     |  1.62     Ca    8.9        29 Jan 2017 08:02    Basic Metabolic Panel (01.29.17 @ 08:02)    Sodium, Serum: 138 mmol/L    Potassium, Serum: 4.0 mmol/L    Chloride, Serum: 105 mmol/L    Carbon Dioxide, Serum: 24 mmol/L    Anion Gap, Serum: 9 mmol/L    Blood Urea Nitrogen, Serum: 28 mg/dL    Creatinine, Serum: 1.62 mg/dL    Glucose, Serum: 118 mg/dL    Calcium, Total Serum: 8.9 mg/dL    eGFR if Non : 40: Interpretative comment  The units for eGFR are ml/min/1.73m2 (normalized body surface area). The  eGFR is calculated from a serum creatinine using the CKD-EPI equation.  Other variables required for calculation are race, age and sex. Among  patients w40: ith chronic kidney disease (CKD), the eGFR is useful in  determining the stage of disease according to KDOQI CKD classification.  All eGFR results are reported numerically with the following  interpretation.          GFR                    With40:                  Without     (ml/min/1.73 m2)    Kidney Damage       Kidney Damage        >= 90                    Stage 1                     Normal        60-89                    Stage 2                     Decreased GFR        :      Stage 3                     Stage 3        15-29                    Stage 4                     Stage 4        < 15                      Stage 5                     Stage 5  Each stage of CKD assumes that the associated GFR level has been in  eff40: ect for at least 3 months. Determination of stages one and two (with  eGFR > 59 ml/min/m2) requires estimation of kidney damage for at least 3  months as defined by structural or functional abnormalities.  Limitations: All estimates of GFR will be les40: s accurate for patients at  extremes of muscle mass (including but not limited to frail elderly,  critically ill, or cancer patients), those with unusual diets, and those  with conditions associated with reduced secretion or extrarenal  elimination of40:  creatinine. The eGFR equation is not recommended for use  in patients with unstable creatinine levels. mL/min/1.73M2    eGFR if African American: 47 mL/min/1.73M2    Complete Blood Count (01.28.17 @ 05:05)    WBC Count: 9.5 K/uL    RBC Count: 3.75 M/uL    Hemoglobin: 11.6 g/dL    Hematocrit: 33.8 %    Mean Cell Volume: 90.0 fl    Mean Cell Hemoglobin: 30.9 pg    Mean Cell Hemoglobin Conc: 34.3 gm/dL    Red Cell Distrib Width: 14.7 %    Platelet Count - Automated: 106 K/uL    EXAM:  CHEST SINGLE AP OR PA                            PROCEDURE DATE:  May 11 2015       INTERPRETATION:  EXAMINATION DATE: May 11, 2015 at 1616 hours    CLINICAL INFORMATION: EKG changes    TECHNIQUE: Single AP view of the chest.     COMPARISON:Chest radiograph from May 21, 2008     FINDINGS:      The patient is status post median sternotomy. There is a left chest wall   dual-lead ICD in place. The lungs are clear. Cardiomegaly is seen. There   are multilevel degenerative changes of the thoracic spine.    IMPRESSION: Cardiomegaly with clear lungs, as on May 21, 2008.         SURYA BETTS M.D., RADIOLOGY RESIDENT  JJ JONES M.D., ATTENDING RADIOLOGIST    This examination was interpreted on: May 11 2015  4:19P.  This document  has been electronically signed. May 11 2015  4:21P.

## 2017-01-31 LAB
ANION GAP SERPL CALC-SCNC: 8 MMOL/L — SIGNIFICANT CHANGE UP (ref 5–17)
APTT BLD: 32.9 SEC — SIGNIFICANT CHANGE UP (ref 27.5–37.4)
BUN SERPL-MCNC: 26 MG/DL — HIGH (ref 7–23)
CALCIUM SERPL-MCNC: 8.2 MG/DL — LOW (ref 8.5–10.1)
CHLORIDE SERPL-SCNC: 110 MMOL/L — HIGH (ref 96–108)
CO2 SERPL-SCNC: 24 MMOL/L — SIGNIFICANT CHANGE UP (ref 22–31)
CREAT SERPL-MCNC: 1.54 MG/DL — HIGH (ref 0.5–1.3)
CULTURE RESULTS: SIGNIFICANT CHANGE UP
CULTURE RESULTS: SIGNIFICANT CHANGE UP
GLUCOSE SERPL-MCNC: 83 MG/DL — SIGNIFICANT CHANGE UP (ref 70–99)
HCT VFR BLD CALC: 35.3 % — LOW (ref 39–50)
HGB BLD-MCNC: 11.7 G/DL — LOW (ref 13–17)
INR BLD: 1.37 RATIO — HIGH (ref 0.88–1.16)
MCHC RBC-ENTMCNC: 30.2 PG — SIGNIFICANT CHANGE UP (ref 27–34)
MCHC RBC-ENTMCNC: 33.2 GM/DL — SIGNIFICANT CHANGE UP (ref 32–36)
MCV RBC AUTO: 90.9 FL — SIGNIFICANT CHANGE UP (ref 80–100)
PLATELET # BLD AUTO: 132 K/UL — LOW (ref 150–400)
POTASSIUM SERPL-MCNC: 4.1 MMOL/L — SIGNIFICANT CHANGE UP (ref 3.5–5.3)
POTASSIUM SERPL-SCNC: 4.1 MMOL/L — SIGNIFICANT CHANGE UP (ref 3.5–5.3)
PROTHROM AB SERPL-ACNC: 15.1 SEC — HIGH (ref 10–13.1)
RBC # BLD: 3.89 M/UL — LOW (ref 4.2–5.8)
RBC # FLD: 15.2 % — HIGH (ref 10.3–14.5)
SODIUM SERPL-SCNC: 142 MMOL/L — SIGNIFICANT CHANGE UP (ref 135–145)
SPECIMEN SOURCE: SIGNIFICANT CHANGE UP
SPECIMEN SOURCE: SIGNIFICANT CHANGE UP
WBC # BLD: 10.9 K/UL — HIGH (ref 3.8–10.5)
WBC # FLD AUTO: 10.9 K/UL — HIGH (ref 3.8–10.5)

## 2017-01-31 RX ORDER — SODIUM CHLORIDE 9 MG/ML
1000 INJECTION, SOLUTION INTRAVENOUS
Qty: 0 | Refills: 0 | Status: DISCONTINUED | OUTPATIENT
Start: 2017-01-31 | End: 2017-02-02

## 2017-01-31 RX ORDER — SODIUM CHLORIDE 9 MG/ML
1000 INJECTION INTRAMUSCULAR; INTRAVENOUS; SUBCUTANEOUS
Qty: 0 | Refills: 0 | Status: DISCONTINUED | OUTPATIENT
Start: 2017-01-31 | End: 2017-01-31

## 2017-01-31 RX ORDER — SODIUM CHLORIDE 9 MG/ML
1000 INJECTION INTRAMUSCULAR; INTRAVENOUS; SUBCUTANEOUS
Qty: 0 | Refills: 0 | Status: DISCONTINUED | OUTPATIENT
Start: 2017-01-31 | End: 2017-02-02

## 2017-01-31 RX ORDER — WARFARIN SODIUM 2.5 MG/1
5 TABLET ORAL DAILY
Qty: 0 | Refills: 0 | Status: DISCONTINUED | OUTPATIENT
Start: 2017-01-31 | End: 2017-01-31

## 2017-01-31 RX ADMIN — PIPERACILLIN AND TAZOBACTAM 25 GRAM(S): 4; .5 INJECTION, POWDER, LYOPHILIZED, FOR SOLUTION INTRAVENOUS at 22:08

## 2017-01-31 RX ADMIN — LATANOPROST 1 DROP(S): 0.05 SOLUTION/ DROPS OPHTHALMIC; TOPICAL at 22:13

## 2017-01-31 RX ADMIN — DORZOLAMIDE HYDROCHLORIDE TIMOLOL MALEATE 1 DROP(S): 20; 5 SOLUTION/ DROPS OPHTHALMIC at 05:11

## 2017-01-31 RX ADMIN — Medication 0.12 MILLIGRAM(S): at 22:07

## 2017-01-31 RX ADMIN — LISINOPRIL 5 MILLIGRAM(S): 2.5 TABLET ORAL at 22:06

## 2017-01-31 RX ADMIN — CARVEDILOL PHOSPHATE 25 MILLIGRAM(S): 80 CAPSULE, EXTENDED RELEASE ORAL at 05:12

## 2017-01-31 RX ADMIN — MEXILETINE HYDROCHLORIDE 150 MILLIGRAM(S): 150 CAPSULE ORAL at 05:14

## 2017-01-31 RX ADMIN — Medication 2000 UNIT(S): at 12:29

## 2017-01-31 RX ADMIN — SODIUM CHLORIDE 100 MILLILITER(S): 9 INJECTION INTRAMUSCULAR; INTRAVENOUS; SUBCUTANEOUS at 12:31

## 2017-01-31 RX ADMIN — Medication 1200 MILLIGRAM(S): at 05:12

## 2017-01-31 RX ADMIN — OXYCODONE HYDROCHLORIDE 5 MILLIGRAM(S): 5 TABLET ORAL at 07:37

## 2017-01-31 RX ADMIN — PANTOPRAZOLE SODIUM 40 MILLIGRAM(S): 20 TABLET, DELAYED RELEASE ORAL at 12:27

## 2017-01-31 RX ADMIN — OXYCODONE HYDROCHLORIDE 5 MILLIGRAM(S): 5 TABLET ORAL at 12:28

## 2017-01-31 RX ADMIN — ATORVASTATIN CALCIUM 5 MILLIGRAM(S): 80 TABLET, FILM COATED ORAL at 22:07

## 2017-01-31 RX ADMIN — PIPERACILLIN AND TAZOBACTAM 25 GRAM(S): 4; .5 INJECTION, POWDER, LYOPHILIZED, FOR SOLUTION INTRAVENOUS at 07:41

## 2017-01-31 RX ADMIN — OXYCODONE HYDROCHLORIDE 5 MILLIGRAM(S): 5 TABLET ORAL at 03:16

## 2017-01-31 RX ADMIN — Medication 150 MILLIGRAM(S): at 05:11

## 2017-01-31 NOTE — PROGRESS NOTE ADULT - SUBJECTIVE AND OBJECTIVE BOX
Patient is a 76y old  Male who presents with a chief complaint of       HPI: 75 y/o male with h/o VT s/p AICD, A.Fib on AC, CAD s/p CABG, DM type 2, gout, glaucoma, HLD, chronic systolic CHF,  PVD, CKD 2-3 was admitted on 1/26 right great toe infection. Pt states the toe redness has been going on for the past  6 weeks, but recently has been worse. States he took doxycycline with initial improvement, but then back worse. States  currently, having oozing discharge from the infection. States occasionally sharp throbbing pain from that toe. Denies any  fever, chills, CP, SOB, abd pain. States intact sensation. Pt was sent in for IV antibiotics by podiatrist.    Feels better  His left foot pain is improving  No fever or chills    1/31/17: pt seen and examined at bedside. States he feels good. very little pain today. scheduled for angio.      SUBJECTIVE & OBJECTIVE: Pt seen and examined at bedside.     T(C): 36.4, Max: 36.8 (01-30 @ 17:09)  HR: 70 (69 - 72)  BP: 153/69 (118/58 - 153/69)  RR: 16 (16 - 18)  SpO2: 99% (96% - 99%)  Wt(kg): --    MEDICATIONS  (STANDING):  allopurinol 100milliGRAM(s) Oral after dinner  aspirin  chewable 81milliGRAM(s) Oral daily  atorvastatin 5milliGRAM(s) Oral at bedtime  carvedilol 25milliGRAM(s) Oral every 12 hours  cholecalciferol 2000Unit(s) Oral daily  digoxin     Tablet 0.125milliGRAM(s) Oral at bedtime  dorzolamide 2%/timolol 0.5% Ophthalmic Solution 1Drop(s) Left EYE every 12 hours  insulin lispro (HumaLOG) corrective regimen sliding scale  SubCutaneous three times a day before meals  dextrose 5%. 1000milliLiter(s) IV Continuous <Continuous>  dextrose 50% Injectable 12.5Gram(s) IV Push once  dextrose 50% Injectable 25Gram(s) IV Push once  dextrose 50% Injectable 25Gram(s) IV Push once  latanoprost 0.005% Ophthalmic Solution 1Drop(s) Left EYE at bedtime  mexiletine 150milliGRAM(s) Oral every 12 hours  pantoprazole    Tablet 40milliGRAM(s) Oral before breakfast  piperacillin/tazobactam IVPB. 3.375Gram(s) IV Intermittent every 8 hours  lisinopril 5milliGRAM(s) Oral at bedtime  sodium chloride 0.9%. 1000milliLiter(s) IV Continuous <Continuous>  acetylcysteine  Oral Solution 1200milliGRAM(s) Oral every 12 hours  vancomycin  IVPB 750milliGRAM(s) IV Intermittent every 12 hours    MEDICATIONS  (PRN):  dextrose Gel 1Dose(s) Oral once PRN Blood Glucose LESS THAN 70 milliGRAM(s)/deciliter  glucagon  Injectable 1milliGRAM(s) IntraMuscular once PRN Glucose LESS THAN 70 milligrams/deciliter  aluminum hydroxide/magnesium hydroxide/simethicone Suspension 30milliLiter(s) Oral every 6 hours PRN Dyspepsia  morphine  - Injectable 4milliGRAM(s) IV Push every 3 hours PRN Severe Pain (7 - 10)  ondansetron Injectable 4milliGRAM(s) IV Push every 6 hours PRN Nausea and/or Vomiting  oxyCODONE IR 5milliGRAM(s) Oral every 4 hours PRN Moderate Pain (4 - 6)  acetaminophen   Tablet. 650milliGRAM(s) Oral every 6 hours PRN Mild Pain (1 - 3)      LABS:                        10.9   7.7   )-----------( 118      ( 31 Jan 2017 06:46 )             33.5     31 Jan 2017 06:46    142    |  108    |  28     ----------------------------<  112    4.0     |  25     |  1.80     Ca    8.5        31 Jan 2017 06:46      PT/INR - ( 31 Jan 2017 06:46 )   PT: 15.1 sec;   INR: 1.37 ratio         PTT - ( 31 Jan 2017 06:46 )  PTT:32.9 sec      CAPILLARY BLOOD GLUCOSE  105 (31 Jan 2017 12:25)  112 (31 Jan 2017 08:40)  114 (30 Jan 2017 17:51)      CAPILLARY BLOOD GLUCOSE  105 (31 Jan 2017 12:25)  112 (31 Jan 2017 08:40)  114 (30 Jan 2017 17:51)    CAPILLARY BLOOD GLUCOSE  105 (31 Jan 2017 12:25)              Chemistry  31 Jan 2017 06:46    142    |  108    |  28     ----------------------------<  112    4.0     |  25     |  1.80     Ca    8.5        31 Jan 2017 06:46        PT/INR - ( 31 Jan 2017 06:46 )   PT: 15.1 sec;   INR: 1.37 ratio         PTT - ( 31 Jan 2017 06:46 )  PTT:32.9 sec        PHYSICAL EXAM:    GENERAL: NAD, well-groomed, well-developed  HEAD:  Atraumatic, Normocephalic  EYES: EOMI, PERRLA, conjunctiva and sclera clear  ENMT: Moist mucous membranes  NECK: Supple, No JVD  NERVOUS SYSTEM:  Alert & Oriented X3, Motor Strength 5/5 B/L upper and lower extremities; DTRs 2+ intact and symmetric  CHEST/LUNG: Clear to auscultation bilaterally; No rales, rhonchi, wheezing, or rubs  HEART: Regular rate and rhythm; No murmurs, rubs, or gallops  ABDOMEN: Soft, Nontender, Nondistended; Bowel sounds present  EXTREMITIES:  2+ Peripheral Pulses, No clubbing, cyanosis, or edema    Daily Height in cm: 175.26 (31 Jan 2017 14:14)    Daily       allopurinol 100milliGRAM(s) Oral after dinner  aspirin  chewable 81milliGRAM(s) Oral daily  atorvastatin 5milliGRAM(s) Oral at bedtime  carvedilol 25milliGRAM(s) Oral every 12 hours  cholecalciferol 2000Unit(s) Oral daily  digoxin     Tablet 0.125milliGRAM(s) Oral at bedtime  dorzolamide 2%/timolol 0.5% Ophthalmic Solution 1Drop(s) Left EYE every 12 hours  insulin lispro (HumaLOG) corrective regimen sliding scale  SubCutaneous three times a day before meals  dextrose 5%. 1000milliLiter(s) IV Continuous <Continuous>  dextrose Gel 1Dose(s) Oral once PRN  dextrose 50% Injectable 12.5Gram(s) IV Push once  dextrose 50% Injectable 25Gram(s) IV Push once  dextrose 50% Injectable 25Gram(s) IV Push once  glucagon  Injectable 1milliGRAM(s) IntraMuscular once PRN  latanoprost 0.005% Ophthalmic Solution 1Drop(s) Left EYE at bedtime  mexiletine 150milliGRAM(s) Oral every 12 hours  pantoprazole    Tablet 40milliGRAM(s) Oral before breakfast  piperacillin/tazobactam IVPB. 3.375Gram(s) IV Intermittent every 8 hours  aluminum hydroxide/magnesium hydroxide/simethicone Suspension 30milliLiter(s) Oral every 6 hours PRN  lisinopril 5milliGRAM(s) Oral at bedtime  morphine  - Injectable 4milliGRAM(s) IV Push every 3 hours PRN  ondansetron Injectable 4milliGRAM(s) IV Push every 6 hours PRN  oxyCODONE IR 5milliGRAM(s) Oral every 4 hours PRN  acetaminophen   Tablet. 650milliGRAM(s) Oral every 6 hours PRN  sodium chloride 0.9%. 1000milliLiter(s) IV Continuous <Continuous>  acetylcysteine  Oral Solution 1200milliGRAM(s) Oral every 12 hours  vancomycin  IVPB 750milliGRAM(s) IV Intermittent every 12 hours              DVT/GI ppx  Discussed with pt @ bedside

## 2017-01-31 NOTE — PROGRESS NOTE ADULT - SUBJECTIVE AND OBJECTIVE BOX
Pt has been seen and examined with FP resident, resident supervised agree with a/p       HPI-Patient is a 76y old  Male who presents with a chief complaint of great toe infection        LABS:                        10.9   7.7   )-----------( 118      ( 31 Jan 2017 06:46 )             33.5     31 Jan 2017 06:46    142    |  108    |  28     ----------------------------<  112    4.0     |  25     |  1.80     Ca    8.5        31 Jan 2017 06:46      PT/INR - ( 31 Jan 2017 06:46 )   PT: 15.1 sec;   INR: 1.37 ratio         PTT - ( 31 Jan 2017 06:46 )  PTT:32.9 sec        PHYSICAL EXAM:  Vital Signs Last 24 Hrs  T(C): 36.4, Max: 36.8 (01-30 @ 17:09)  T(F): 97.5, Max: 98.3 (01-31 @ 08:39)  HR: 70 (69 - 72)  BP: 153/69 (118/58 - 153/69)  BP(mean): --  RR: 16 (16 - 18)  SpO2: 99% (96% - 99%)  GENERAL: comfortable   HEAD:  Atraumatic, Normocephalic  EYES: EOMI, PERRLA, conjunctiva and sclera clear  HEENT: Moist mucous membranes  NECK: Supple, No JVD  NERVOUS SYSTEM:  Alert & Oriented X3, Motor Strength 5/5 B/L upper and lower extremities; DTRs 2+ intact and symmetric  CHEST/LUNG: Clear to auscultation bilaterally; No rales, rhonchi, wheezing, or rubs  HEART: Regular rate and rhythm; No murmurs, rubs, or gallops  ABDOMEN: Soft, Nontender, Nondistended; Bowel sounds present  GENITOURINARY- Voiding, no palpable bladder  EXTREMITIES:  No asymmetrical swelling noted   MUSCULOSKELETAL No muscle tenderness, Muscle tone normal, No joint tenderness, no Joint swelling, Joint range of motion-normal      A/P      #Right halux cellulitis with dry ulcer  -ct abx   -angiogram today for evaluation of possible PAD    #dvt pr

## 2017-01-31 NOTE — PROGRESS NOTE ADULT - SUBJECTIVE AND OBJECTIVE BOX
Patient is a 76y old  Male who presents with a chief complaint of pain R foot. For angio today.    HPI:      Allergies    No Known Allergies    Intolerances        MEDICATIONS  (STANDING):  allopurinol 100milliGRAM(s) Oral after dinner  aspirin  chewable 81milliGRAM(s) Oral daily  atorvastatin 5milliGRAM(s) Oral at bedtime  carvedilol 25milliGRAM(s) Oral every 12 hours  cholecalciferol 2000Unit(s) Oral daily  digoxin     Tablet 0.125milliGRAM(s) Oral at bedtime  dorzolamide 2%/timolol 0.5% Ophthalmic Solution 1Drop(s) Left EYE every 12 hours  insulin lispro (HumaLOG) corrective regimen sliding scale  SubCutaneous three times a day before meals  dextrose 5%. 1000milliLiter(s) IV Continuous <Continuous>  dextrose 50% Injectable 12.5Gram(s) IV Push once  dextrose 50% Injectable 25Gram(s) IV Push once  dextrose 50% Injectable 25Gram(s) IV Push once  latanoprost 0.005% Ophthalmic Solution 1Drop(s) Left EYE at bedtime  mexiletine 150milliGRAM(s) Oral every 12 hours  pantoprazole    Tablet 40milliGRAM(s) Oral before breakfast  piperacillin/tazobactam IVPB. 3.375Gram(s) IV Intermittent every 8 hours  lisinopril 5milliGRAM(s) Oral at bedtime  vancomycin  IVPB 500milliGRAM(s) IV Intermittent every 12 hours  acetylcysteine  Oral Solution 1200milliGRAM(s) Oral every 12 hours    MEDICATIONS  (PRN):  dextrose Gel 1Dose(s) Oral once PRN Blood Glucose LESS THAN 70 milliGRAM(s)/deciliter  glucagon  Injectable 1milliGRAM(s) IntraMuscular once PRN Glucose LESS THAN 70 milligrams/deciliter  aluminum hydroxide/magnesium hydroxide/simethicone Suspension 30milliLiter(s) Oral every 6 hours PRN Dyspepsia  morphine  - Injectable 4milliGRAM(s) IV Push every 3 hours PRN Severe Pain (7 - 10)  ondansetron Injectable 4milliGRAM(s) IV Push every 6 hours PRN Nausea and/or Vomiting  oxyCODONE IR 5milliGRAM(s) Oral every 4 hours PRN Moderate Pain (4 - 6)  acetaminophen   Tablet. 650milliGRAM(s) Oral every 6 hours PRN Mild Pain (1 - 3)        RADIOLOGY    CBC Full  -  ( 29 Jan 2017 08:02 )  WBC Count : 10.4 K/uL  Hemoglobin : 11.6 g/dL  Hematocrit : 35.0 %  Platelet Count - Automated : 123 K/uL  Mean Cell Volume : 90.2 fl  Mean Cell Hemoglobin : 29.9 pg  Mean Cell Hemoglobin Concentration : 33.2 gm/dL  Auto Neutrophil # : x  Auto Lymphocyte # : x  Auto Monocyte # : x  Auto Eosinophil # : x  Auto Basophil # : x  Auto Neutrophil % : x  Auto Lymphocyte % : x  Auto Monocyte % : x  Auto Eosinophil % : x  Auto Basophil % : x      29 Jan 2017 08:02    138    |  105    |  28     ----------------------------<  118    4.0     |  24     |  1.62     Ca    8.9        29 Jan 2017 08:02    Basic Metabolic Panel (01.29.17 @ 08:02)    Sodium, Serum: 138 mmol/L    Potassium, Serum: 4.0 mmol/L    Chloride, Serum: 105 mmol/L    Carbon Dioxide, Serum: 24 mmol/L    Anion Gap, Serum: 9 mmol/L    Blood Urea Nitrogen, Serum: 28 mg/dL    Creatinine, Serum: 1.62 mg/dL    Glucose, Serum: 118 mg/dL    Calcium, Total Serum: 8.9 mg/dL    eGFR if Non : 40: Interpretative comment  The units for eGFR are ml/min/1.73m2 (normalized body surface area). The  eGFR is calculated from a serum creatinine using the CKD-EPI equation.  Other variables required for calculation are race, age and sex. Among  patients w40: ith chronic kidney disease (CKD), the eGFR is useful in  determining the stage of disease according to KDOQI CKD classification.  All eGFR results are reported numerically with the following  interpretation.          GFR                    With40:                  Without     (ml/min/1.73 m2)    Kidney Damage       Kidney Damage        >= 90                    Stage 1                     Normal        60-89                    Stage 2                     Decreased GFR        :      Stage 3                     Stage 3        15-29                    Stage 4                     Stage 4        < 15                      Stage 5                     Stage 5  Each stage of CKD assumes that the associated GFR level has been in  eff40: ect for at least 3 months. Determination of stages one and two (with  eGFR > 59 ml/min/m2) requires estimation of kidney damage for at least 3  months as defined by structural or functional abnormalities.  Limitations: All estimates of GFR will be les40: s accurate for patients at  extremes of muscle mass (including but not limited to frail elderly,  critically ill, or cancer patients), those with unusual diets, and those  with conditions associated with reduced secretion or extrarenal  elimination of40:  creatinine. The eGFR equation is not recommended for use  in patients with unstable creatinine levels. mL/min/1.73M2    eGFR if African American: 47 mL/min/1.73M2    Complete Blood Count (01.28.17 @ 05:05)    WBC Count: 9.5 K/uL    RBC Count: 3.75 M/uL    Hemoglobin: 11.6 g/dL    Hematocrit: 33.8 %    Mean Cell Volume: 90.0 fl    Mean Cell Hemoglobin: 30.9 pg    Mean Cell Hemoglobin Conc: 34.3 gm/dL    Red Cell Distrib Width: 14.7 %    Platelet Count - Automated: 106 K/uL    EXAM:  CHEST SINGLE AP OR PA                            PROCEDURE DATE:  May 11 2015       INTERPRETATION:  EXAMINATION DATE: May 11, 2015 at 1616 hours    CLINICAL INFORMATION: EKG changes    TECHNIQUE: Single AP view of the chest.     COMPARISON:Chest radiograph from May 21, 2008     FINDINGS:      The patient is status post median sternotomy. There is a left chest wall   dual-lead ICD in place. The lungs are clear. Cardiomegaly is seen. There   are multilevel degenerative changes of the thoracic spine.    IMPRESSION: Cardiomegaly with clear lungs, as on May 21, 2008.         SURYA BETTS M.D., RADIOLOGY RESIDENT  JJ JONES M.D., ATTENDING RADIOLOGIST    This examination was interpreted on: May 11 2015  4:19P.  This document  has been electronically signed. May 11 2015  4:21P.

## 2017-01-31 NOTE — PROGRESS NOTE ADULT - ASSESSMENT
75 M with CAD s/p CABG, CHF s/p AICD and PPM, Afib on Coumadin, DM, CKD (unknown baseline), PVD, HTN, HLD sent to admitted for IV abx due to infection of right 1st toe.     #) Cellulitis of 1st toe of right foot   - Pt to go for Angiogram on Today with Dr. Royal. Continue management by Dr. Gamble. Awaiting official result of bone scan.  - increase Vancomycin to 750 mg IV and continue zosyn  - bone scan neg for osteo  -f/u with pt after angio    #PVD  - pt scheduled for angio in the morning with  Dr Royal    #)CAD CHF  -Lisinopril, carvedilil, ASA 81 digoxin    #)DM   continue ISS and BGM    #) GOut  - Allopurinol    D/W

## 2017-01-31 NOTE — PROGRESS NOTE ADULT - SUBJECTIVE AND OBJECTIVE BOX
Patient is a 76y old  Male who presents with a chief complaint of   HPI:  77 y/o male with h/o VT s/p AICD, A.Fib on AC, CAD s/p CABG, DM type 2, gout, glaucoma, HLD, chronic systolic CHF,  PVD, CKD 2-3 was admitted on 1/26 right great toe infection. Pt states the toe redness has been going on for the past  6 weeks, but recently has been worse. States he took doxycycline with initial improvement, but then back worse. States  currently, having oozing discharge from the infection. States occasionally sharp throbbing pain from that toe. Denies any  fever, chills, CP, SOB, abd pain. States intact sensation. Pt was sent in for IV antibiotics by podiatrist.    Feels better  His left foot pain is improving  No fever or chills  No side effects    MEDICATIONS  (STANDING):  allopurinol 100milliGRAM(s) Oral after dinner  aspirin  chewable 81milliGRAM(s) Oral daily  atorvastatin 5milliGRAM(s) Oral at bedtime  carvedilol 25milliGRAM(s) Oral every 12 hours  cholecalciferol 2000Unit(s) Oral daily  digoxin     Tablet 0.125milliGRAM(s) Oral at bedtime  dorzolamide 2%/timolol 0.5% Ophthalmic Solution 1Drop(s) Left EYE every 12 hours  insulin lispro (HumaLOG) corrective regimen sliding scale  SubCutaneous three times a day before meals  dextrose 5%. 1000milliLiter(s) IV Continuous <Continuous>  dextrose 50% Injectable 12.5Gram(s) IV Push once  dextrose 50% Injectable 25Gram(s) IV Push once  dextrose 50% Injectable 25Gram(s) IV Push once  latanoprost 0.005% Ophthalmic Solution 1Drop(s) Left EYE at bedtime  mexiletine 150milliGRAM(s) Oral every 12 hours  pantoprazole    Tablet 40milliGRAM(s) Oral before breakfast  piperacillin/tazobactam IVPB. 3.375Gram(s) IV Intermittent every 8 hours  lisinopril 5milliGRAM(s) Oral at bedtime  sodium chloride 0.9%. 1000milliLiter(s) IV Continuous <Continuous>  acetylcysteine  Oral Solution 1200milliGRAM(s) Oral every 12 hours  vancomycin  IVPB 750milliGRAM(s) IV Intermittent every 12 hours    MEDICATIONS  (PRN):  dextrose Gel 1Dose(s) Oral once PRN Blood Glucose LESS THAN 70 milliGRAM(s)/deciliter  glucagon  Injectable 1milliGRAM(s) IntraMuscular once PRN Glucose LESS THAN 70 milligrams/deciliter  aluminum hydroxide/magnesium hydroxide/simethicone Suspension 30milliLiter(s) Oral every 6 hours PRN Dyspepsia  morphine  - Injectable 4milliGRAM(s) IV Push every 3 hours PRN Severe Pain (7 - 10)  ondansetron Injectable 4milliGRAM(s) IV Push every 6 hours PRN Nausea and/or Vomiting  oxyCODONE IR 5milliGRAM(s) Oral every 4 hours PRN Moderate Pain (4 - 6)  acetaminophen   Tablet. 650milliGRAM(s) Oral every 6 hours PRN Mild Pain (1 - 3)      Vital Signs Last 24 Hrs  T(C): 36.4, Max: 36.8 (01-30 @ 17:09)  T(F): 97.5, Max: 98.3 (01-31 @ 08:39)  HR: 70 (69 - 72)  BP: 153/69 (118/58 - 153/69)  BP(mean): --  RR: 16 (16 - 18)  SpO2: 99% (96% - 99%)    Physical Exam:        Daily   Constitutional: frail looking  HEENT: NC/AT, EOMI, PERRLA  Neck: supple  Back: no tenderness  Respiratory: clear  Cardiovascular: S1S2 regular, no murmurs  Abdomen: soft, not tender, not distended, positive BS  Genitourinary: deferred  Rectal: deferred  Musculoskeletal: no muscle tenderness, no joint swelling or tenderness  Extremities: no pedal edema; left foot erythema and edema is improved; left great toe dry superficial ulcer; toe is colder  Neurological: AxOx3, moving all extremities, no focal deficits  Skin: no rashes    Labs:                        10.9   7.7   )-----------( 118      ( 31 Jan 2017 06:46 )             33.5     31 Jan 2017 06:46    142    |  108    |  28     ----------------------------<  112    4.0     |  25     |  1.80     Ca    8.5        31 Jan 2017 06:46             Cultures:                         11.1   8.5   )-----------( 114      ( 30 Jan 2017 06:40 )             33.9     30 Jan 2017 06:40    140    |  106    |  25     ----------------------------<  122    3.9     |  24     |  1.75     Ca    8.6        30 Jan 2017 06:40    Vancomycin Level, Trough (01.29.17 @ 10:32)    Vancomycin Level, Trough: 8.8: Vancomycin trough levels should be rapidly reached and maintained at  15-20 ug/ml for life threatening MRSA  infections such as sepsis, endocarditis, osteomyelitis and pneumonia. A  first trough level should be drawn  before the 3rd or 4th dose.  Risk8.8:  of renal toxicity is increased for levels >15 ug/ml, in patients on  other nephrotoxic drugs, who are  hemodynamically unstable, have unstable renal function, or are on  Vancomycin therapy for >14 days. Renal function with  creatinine levels should b8.8: e monitored for those patients. ug/mL            Radiology:  bone scan reported with no evidence of OM (spoken with nuclear medicine RN Jaycee)

## 2017-01-31 NOTE — PROGRESS NOTE ADULT - ASSESSMENT
77 y/o male with h/o VT s/p AICD, A.Fib on AC, CAD s/p CABG, DM type 2, gout, glaucoma, HLD, chronic systolic CHF,  PVD, CKD 2-3 was admitted on 1/26 right great toe infection. Pt states the toe redness has been going on for the past  6 weeks, but recently has been worse. States he took doxycycline with initial improvement, but then back worse. States  currently, having oozing discharge from the infection. States occasionally sharp throbbing pain from that toe. Denies any  fever, chills, CP, SOB, abd pain. States intact sensation. Pt was sent in for IV antibiotics by podiatrist.  1. Right halux cellulitis with dry ulcer. CRF stage 3.  -erythema is improving  -f/u BC x 2  -on vancomycin 500 mg IV q12h and zosyn 3.375 mg IV q8h (infused over 4h) # 5  -toleating abx well so far  -d/c vancomycin  -vascular evaluation appreciated: angiogram today  -monitor BMP  -continue IV abx coverage with zosyn for now  -monitor temps  -f/u CBC  -supportive care  2. Other issues: VT s/p AICD, A.Fib on AC, CAD s/p CABG, DM type 2, gout, glaucoma, HLD, chronic systolic CHF,  PVD, CKD 2-3  -care per medicine

## 2017-02-01 DIAGNOSIS — E11.9 TYPE 2 DIABETES MELLITUS WITHOUT COMPLICATIONS: ICD-10-CM

## 2017-02-01 LAB
ALBUMIN SERPL ELPH-MCNC: 2.8 G/DL — LOW (ref 3.3–5)
ALP SERPL-CCNC: 45 U/L — SIGNIFICANT CHANGE UP (ref 40–120)
ALT FLD-CCNC: 22 U/L — SIGNIFICANT CHANGE UP (ref 12–78)
ANION GAP SERPL CALC-SCNC: 6 MMOL/L — SIGNIFICANT CHANGE UP (ref 5–17)
ANION GAP SERPL CALC-SCNC: 7 MMOL/L — SIGNIFICANT CHANGE UP (ref 5–17)
AST SERPL-CCNC: 18 U/L — SIGNIFICANT CHANGE UP (ref 15–37)
BILIRUB SERPL-MCNC: 0.5 MG/DL — SIGNIFICANT CHANGE UP (ref 0.2–1.2)
BUN SERPL-MCNC: 29 MG/DL — HIGH (ref 7–23)
BUN SERPL-MCNC: 33 MG/DL — HIGH (ref 7–23)
CALCIUM SERPL-MCNC: 8.5 MG/DL — SIGNIFICANT CHANGE UP (ref 8.5–10.1)
CALCIUM SERPL-MCNC: 8.7 MG/DL — SIGNIFICANT CHANGE UP (ref 8.5–10.1)
CHLORIDE SERPL-SCNC: 110 MMOL/L — HIGH (ref 96–108)
CHLORIDE SERPL-SCNC: 111 MMOL/L — HIGH (ref 96–108)
CO2 SERPL-SCNC: 25 MMOL/L — SIGNIFICANT CHANGE UP (ref 22–31)
CO2 SERPL-SCNC: 25 MMOL/L — SIGNIFICANT CHANGE UP (ref 22–31)
CREAT SERPL-MCNC: 1.72 MG/DL — HIGH (ref 0.5–1.3)
CREAT SERPL-MCNC: 1.96 MG/DL — HIGH (ref 0.5–1.3)
GLUCOSE SERPL-MCNC: 136 MG/DL — HIGH (ref 70–99)
GLUCOSE SERPL-MCNC: 89 MG/DL — SIGNIFICANT CHANGE UP (ref 70–99)
HCT VFR BLD CALC: 31.5 % — LOW (ref 39–50)
HGB BLD-MCNC: 10.4 G/DL — LOW (ref 13–17)
INR BLD: 1.24 RATIO — HIGH (ref 0.88–1.16)
MCHC RBC-ENTMCNC: 30.3 PG — SIGNIFICANT CHANGE UP (ref 27–34)
MCHC RBC-ENTMCNC: 33.1 GM/DL — SIGNIFICANT CHANGE UP (ref 32–36)
MCV RBC AUTO: 91.5 FL — SIGNIFICANT CHANGE UP (ref 80–100)
PLATELET # BLD AUTO: 126 K/UL — LOW (ref 150–400)
POTASSIUM SERPL-MCNC: 4.2 MMOL/L — SIGNIFICANT CHANGE UP (ref 3.5–5.3)
POTASSIUM SERPL-MCNC: 4.3 MMOL/L — SIGNIFICANT CHANGE UP (ref 3.5–5.3)
POTASSIUM SERPL-SCNC: 4.2 MMOL/L — SIGNIFICANT CHANGE UP (ref 3.5–5.3)
POTASSIUM SERPL-SCNC: 4.3 MMOL/L — SIGNIFICANT CHANGE UP (ref 3.5–5.3)
PROT SERPL-MCNC: 5.7 GM/DL — LOW (ref 6–8.3)
PROTHROM AB SERPL-ACNC: 13.7 SEC — HIGH (ref 10–13.1)
RBC # BLD: 3.44 M/UL — LOW (ref 4.2–5.8)
RBC # FLD: 15.5 % — HIGH (ref 10.3–14.5)
SODIUM SERPL-SCNC: 142 MMOL/L — SIGNIFICANT CHANGE UP (ref 135–145)
SODIUM SERPL-SCNC: 142 MMOL/L — SIGNIFICANT CHANGE UP (ref 135–145)
VANCOMYCIN TROUGH SERPL-MCNC: 9.5 UG/ML — LOW (ref 10–20)
WBC # BLD: 7.5 K/UL — SIGNIFICANT CHANGE UP (ref 3.8–10.5)
WBC # FLD AUTO: 7.5 K/UL — SIGNIFICANT CHANGE UP (ref 3.8–10.5)

## 2017-02-01 RX ORDER — WARFARIN SODIUM 2.5 MG/1
5 TABLET ORAL DAILY
Qty: 0 | Refills: 0 | Status: DISCONTINUED | OUTPATIENT
Start: 2017-02-01 | End: 2017-02-02

## 2017-02-01 RX ORDER — SODIUM CHLORIDE 9 MG/ML
1000 INJECTION INTRAMUSCULAR; INTRAVENOUS; SUBCUTANEOUS
Qty: 0 | Refills: 0 | Status: DISCONTINUED | OUTPATIENT
Start: 2017-02-01 | End: 2017-02-02

## 2017-02-01 RX ADMIN — CARVEDILOL PHOSPHATE 25 MILLIGRAM(S): 80 CAPSULE, EXTENDED RELEASE ORAL at 05:18

## 2017-02-01 RX ADMIN — MEXILETINE HYDROCHLORIDE 150 MILLIGRAM(S): 150 CAPSULE ORAL at 05:18

## 2017-02-01 RX ADMIN — LATANOPROST 1 DROP(S): 0.05 SOLUTION/ DROPS OPHTHALMIC; TOPICAL at 21:28

## 2017-02-01 RX ADMIN — PIPERACILLIN AND TAZOBACTAM 25 GRAM(S): 4; .5 INJECTION, POWDER, LYOPHILIZED, FOR SOLUTION INTRAVENOUS at 13:07

## 2017-02-01 RX ADMIN — SODIUM CHLORIDE 50 MILLILITER(S): 9 INJECTION INTRAMUSCULAR; INTRAVENOUS; SUBCUTANEOUS at 03:04

## 2017-02-01 RX ADMIN — Medication 81 MILLIGRAM(S): at 11:53

## 2017-02-01 RX ADMIN — ATORVASTATIN CALCIUM 5 MILLIGRAM(S): 80 TABLET, FILM COATED ORAL at 21:26

## 2017-02-01 RX ADMIN — Medication 650 MILLIGRAM(S): at 21:23

## 2017-02-01 RX ADMIN — Medication 650 MILLIGRAM(S): at 10:47

## 2017-02-01 RX ADMIN — MEXILETINE HYDROCHLORIDE 150 MILLIGRAM(S): 150 CAPSULE ORAL at 18:47

## 2017-02-01 RX ADMIN — OXYCODONE HYDROCHLORIDE 5 MILLIGRAM(S): 5 TABLET ORAL at 02:44

## 2017-02-01 RX ADMIN — LISINOPRIL 5 MILLIGRAM(S): 2.5 TABLET ORAL at 21:25

## 2017-02-01 RX ADMIN — Medication 1200 MILLIGRAM(S): at 05:18

## 2017-02-01 RX ADMIN — DORZOLAMIDE HYDROCHLORIDE TIMOLOL MALEATE 1 DROP(S): 20; 5 SOLUTION/ DROPS OPHTHALMIC at 18:53

## 2017-02-01 RX ADMIN — Medication 2000 UNIT(S): at 11:53

## 2017-02-01 RX ADMIN — Medication 1200 MILLIGRAM(S): at 20:38

## 2017-02-01 RX ADMIN — Medication 100 MILLIGRAM(S): at 18:47

## 2017-02-01 RX ADMIN — PIPERACILLIN AND TAZOBACTAM 25 GRAM(S): 4; .5 INJECTION, POWDER, LYOPHILIZED, FOR SOLUTION INTRAVENOUS at 05:17

## 2017-02-01 RX ADMIN — WARFARIN SODIUM 5 MILLIGRAM(S): 2.5 TABLET ORAL at 21:28

## 2017-02-01 RX ADMIN — Medication 0.12 MILLIGRAM(S): at 21:25

## 2017-02-01 RX ADMIN — PANTOPRAZOLE SODIUM 40 MILLIGRAM(S): 20 TABLET, DELAYED RELEASE ORAL at 11:58

## 2017-02-01 RX ADMIN — CARVEDILOL PHOSPHATE 25 MILLIGRAM(S): 80 CAPSULE, EXTENDED RELEASE ORAL at 18:47

## 2017-02-01 RX ADMIN — Medication 650 MILLIGRAM(S): at 08:52

## 2017-02-01 RX ADMIN — DORZOLAMIDE HYDROCHLORIDE TIMOLOL MALEATE 1 DROP(S): 20; 5 SOLUTION/ DROPS OPHTHALMIC at 05:18

## 2017-02-01 NOTE — PROGRESS NOTE ADULT - SUBJECTIVE AND OBJECTIVE BOX
Patient is a 76y old  Male who presents with a chief complaint of     HPI:      Allergies    No Known Allergies    Intolerances        MEDICATIONS  (STANDING):  allopurinol 100milliGRAM(s) Oral after dinner  aspirin  chewable 81milliGRAM(s) Oral daily  atorvastatin 5milliGRAM(s) Oral at bedtime  carvedilol 25milliGRAM(s) Oral every 12 hours  cholecalciferol 2000Unit(s) Oral daily  digoxin     Tablet 0.125milliGRAM(s) Oral at bedtime  dorzolamide 2%/timolol 0.5% Ophthalmic Solution 1Drop(s) Left EYE every 12 hours  insulin lispro (HumaLOG) corrective regimen sliding scale  SubCutaneous three times a day before meals  dextrose 5%. 1000milliLiter(s) IV Continuous <Continuous>  dextrose 50% Injectable 12.5Gram(s) IV Push once  dextrose 50% Injectable 25Gram(s) IV Push once  dextrose 50% Injectable 25Gram(s) IV Push once  latanoprost 0.005% Ophthalmic Solution 1Drop(s) Left EYE at bedtime  mexiletine 150milliGRAM(s) Oral every 12 hours  pantoprazole    Tablet 40milliGRAM(s) Oral before breakfast  piperacillin/tazobactam IVPB. 3.375Gram(s) IV Intermittent every 8 hours  lisinopril 5milliGRAM(s) Oral at bedtime  acetylcysteine  Oral Solution 1200milliGRAM(s) Oral every 12 hours  sodium chloride 0.9%. 1000milliLiter(s) IV Continuous <Continuous>  dextrose 5% + sodium chloride 0.9%. 1000milliLiter(s) IV Continuous <Continuous>  sodium chloride 0.9%. 1000milliLiter(s) IV Continuous <Continuous>  sodium chloride 0.9%. 1000milliLiter(s) IV Continuous <Continuous>    MEDICATIONS  (PRN):  dextrose Gel 1Dose(s) Oral once PRN Blood Glucose LESS THAN 70 milliGRAM(s)/deciliter  glucagon  Injectable 1milliGRAM(s) IntraMuscular once PRN Glucose LESS THAN 70 milligrams/deciliter  aluminum hydroxide/magnesium hydroxide/simethicone Suspension 30milliLiter(s) Oral every 6 hours PRN Dyspepsia  morphine  - Injectable 4milliGRAM(s) IV Push every 3 hours PRN Severe Pain (7 - 10)  ondansetron Injectable 4milliGRAM(s) IV Push every 6 hours PRN Nausea and/or Vomiting  oxyCODONE IR 5milliGRAM(s) Oral every 4 hours PRN Moderate Pain (4 - 6)  acetaminophen   Tablet. 650milliGRAM(s) Oral every 6 hours PRN Mild Pain (1 - 3)        RADIOLOGY    CBC Full  -  ( 31 Jan 2017 21:39 )  WBC Count : 10.9 K/uL  Hemoglobin : 11.7 g/dL  Hematocrit : 35.3 %  Platelet Count - Automated : 132 K/uL  Mean Cell Volume : 90.9 fl  Mean Cell Hemoglobin : 30.2 pg  Mean Cell Hemoglobin Concentration : 33.2 gm/dL  Auto Neutrophil # : x  Auto Lymphocyte # : x  Auto Monocyte # : x  Auto Eosinophil # : x  Auto Basophil # : x  Auto Neutrophil % : x  Auto Lymphocyte % : x  Auto Monocyte % : x  Auto Eosinophil % : x  Auto Basophil % : x      31 Jan 2017 21:39    142    |  110    |  26     ----------------------------<  83     4.1     |  24     |  1.54     Ca    8.2        31 Jan 2017 21:39

## 2017-02-01 NOTE — PROGRESS NOTE ADULT - SUBJECTIVE AND OBJECTIVE BOX
Patient is a 76y old  Male who presents with a chief complaint of       HPI: 77 y/o male with h/o VT s/p AICD, A.Fib on AC, CAD s/p CABG, DM type 2, gout, glaucoma, HLD, chronic systolic CHF,  PVD, CKD 2-3 was admitted on 1/26 right great toe infection. Pt states the toe redness has been going on for the past  6 weeks, but recently has been worse. States he took doxycycline with initial improvement, but then back worse. States  currently, having oozing discharge from the infection. States occasionally sharp throbbing pain from that toe. Denies any  fever, chills, CP, SOB, abd pain. States intact sensation. Pt was sent in for IV antibiotics by podiatrist.    Feels better  His left foot pain is improving  No fever or chills    1/31/17: pt seen and examined at bedside. States he feels good. very little pain today. scheduled for angio.    2/1/17: pt seen and examined at bedside.       SUBJECTIVE & OBJECTIVE: Pt seen and examined at bedside.     T(C): 36.4, Max: 37.2 (01-31 @ 20:15)  HR: 70 (69 - 74)  BP: 108/50 (106/52 - 153/69)  RR: 16 (14 - 20)  SpO2: 95% (95% - 99%)  Wt(kg): --    MEDICATIONS  (STANDING):  allopurinol 100milliGRAM(s) Oral after dinner  aspirin  chewable 81milliGRAM(s) Oral daily  atorvastatin 5milliGRAM(s) Oral at bedtime  carvedilol 25milliGRAM(s) Oral every 12 hours  cholecalciferol 2000Unit(s) Oral daily  digoxin     Tablet 0.125milliGRAM(s) Oral at bedtime  dorzolamide 2%/timolol 0.5% Ophthalmic Solution 1Drop(s) Left EYE every 12 hours  insulin lispro (HumaLOG) corrective regimen sliding scale  SubCutaneous three times a day before meals  dextrose 5%. 1000milliLiter(s) IV Continuous <Continuous>  dextrose 50% Injectable 12.5Gram(s) IV Push once  dextrose 50% Injectable 25Gram(s) IV Push once  dextrose 50% Injectable 25Gram(s) IV Push once  latanoprost 0.005% Ophthalmic Solution 1Drop(s) Left EYE at bedtime  mexiletine 150milliGRAM(s) Oral every 12 hours  pantoprazole    Tablet 40milliGRAM(s) Oral before breakfast  piperacillin/tazobactam IVPB. 3.375Gram(s) IV Intermittent every 8 hours  lisinopril 5milliGRAM(s) Oral at bedtime  acetylcysteine  Oral Solution 1200milliGRAM(s) Oral every 12 hours  sodium chloride 0.9%. 1000milliLiter(s) IV Continuous <Continuous>  dextrose 5% + sodium chloride 0.9%. 1000milliLiter(s) IV Continuous <Continuous>  sodium chloride 0.9%. 1000milliLiter(s) IV Continuous <Continuous>  sodium chloride 0.9%. 1000milliLiter(s) IV Continuous <Continuous>    MEDICATIONS  (PRN):  dextrose Gel 1Dose(s) Oral once PRN Blood Glucose LESS THAN 70 milliGRAM(s)/deciliter  glucagon  Injectable 1milliGRAM(s) IntraMuscular once PRN Glucose LESS THAN 70 milligrams/deciliter  aluminum hydroxide/magnesium hydroxide/simethicone Suspension 30milliLiter(s) Oral every 6 hours PRN Dyspepsia  morphine  - Injectable 4milliGRAM(s) IV Push every 3 hours PRN Severe Pain (7 - 10)  ondansetron Injectable 4milliGRAM(s) IV Push every 6 hours PRN Nausea and/or Vomiting  oxyCODONE IR 5milliGRAM(s) Oral every 4 hours PRN Moderate Pain (4 - 6)  acetaminophen   Tablet. 650milliGRAM(s) Oral every 6 hours PRN Mild Pain (1 - 3)      LABS:                        11.7   10.9  )-----------( 132      ( 31 Jan 2017 21:39 )             35.3     01 Feb 2017 06:58    142    |  110    |  29     ----------------------------<  89     4.3     |  25     |  1.72     Ca    8.7        01 Feb 2017 06:58      PT/INR - ( 01 Feb 2017 06:58 )   PT: 13.7 sec;   INR: 1.24 ratio         PTT - ( 01 Feb 2017 06:58 )  PTT:31.8 sec      CAPILLARY BLOOD GLUCOSE  98 (01 Feb 2017 08:09)  99 (31 Jan 2017 16:29)  105 (31 Jan 2017 12:25)  112 (31 Jan 2017 08:40)      CAPILLARY BLOOD GLUCOSE  98 (01 Feb 2017 08:09)  99 (31 Jan 2017 16:29)  105 (31 Jan 2017 12:25)  112 (31 Jan 2017 08:40)    CAPILLARY BLOOD GLUCOSE  98 (01 Feb 2017 08:09)            RECENT CULTURES:      RADIOLOGY & ADDITIONAL TESTS:      PHYSICAL EXAM:    GENERAL: NAD, well-groomed, well-developed  HEAD:  Atraumatic, Normocephalic  EYES: EOMI, PERRLA, conjunctiva and sclera clear  ENMT: Moist mucous membranes  NECK: Supple, No JVD  NERVOUS SYSTEM:  Alert & Oriented X3, Motor Strength 5/5 B/L upper and lower extremities; DTRs 2+ intact and symmetric  CHEST/LUNG: Clear to auscultation bilaterally; No rales, rhonchi, wheezing, or rubs  HEART: Regular rate and rhythm; No murmurs, rubs, or gallops  ABDOMEN: Soft, Nontender, Nondistended; Bowel sounds present  EXTREMITIES:  2+ Peripheral Pulses, No clubbing, cyanosis, or edema    Daily Height in cm: 175.26 (31 Jan 2017 14:14)    Daily       allopurinol 100milliGRAM(s) Oral after dinner  aspirin  chewable 81milliGRAM(s) Oral daily  atorvastatin 5milliGRAM(s) Oral at bedtime  carvedilol 25milliGRAM(s) Oral every 12 hours  cholecalciferol 2000Unit(s) Oral daily  digoxin     Tablet 0.125milliGRAM(s) Oral at bedtime  dorzolamide 2%/timolol 0.5% Ophthalmic Solution 1Drop(s) Left EYE every 12 hours  insulin lispro (HumaLOG) corrective regimen sliding scale  SubCutaneous three times a day before meals  dextrose 5%. 1000milliLiter(s) IV Continuous <Continuous>  dextrose Gel 1Dose(s) Oral once PRN  dextrose 50% Injectable 12.5Gram(s) IV Push once  dextrose 50% Injectable 25Gram(s) IV Push once  dextrose 50% Injectable 25Gram(s) IV Push once  glucagon  Injectable 1milliGRAM(s) IntraMuscular once PRN  latanoprost 0.005% Ophthalmic Solution 1Drop(s) Left EYE at bedtime  mexiletine 150milliGRAM(s) Oral every 12 hours  pantoprazole    Tablet 40milliGRAM(s) Oral before breakfast  piperacillin/tazobactam IVPB. 3.375Gram(s) IV Intermittent every 8 hours  aluminum hydroxide/magnesium hydroxide/simethicone Suspension 30milliLiter(s) Oral every 6 hours PRN  lisinopril 5milliGRAM(s) Oral at bedtime  morphine  - Injectable 4milliGRAM(s) IV Push every 3 hours PRN  ondansetron Injectable 4milliGRAM(s) IV Push every 6 hours PRN  oxyCODONE IR 5milliGRAM(s) Oral every 4 hours PRN  acetaminophen   Tablet. 650milliGRAM(s) Oral every 6 hours PRN  acetylcysteine  Oral Solution 1200milliGRAM(s) Oral every 12 hours  sodium chloride 0.9%. 1000milliLiter(s) IV Continuous <Continuous>  dextrose 5% + sodium chloride 0.9%. 1000milliLiter(s) IV Continuous <Continuous>  sodium chloride 0.9%. 1000milliLiter(s) IV Continuous <Continuous>  sodium chloride 0.9%. 1000milliLiter(s) IV Continuous <Continuous>              DVT/GI ppx  Discussed with pt @ bedside Patient is a 76y old  Male who presents with a chief complaint of       HPI: 75 y/o male with h/o VT s/p AICD, A.Fib on AC, CAD s/p CABG, DM type 2, gout, glaucoma, HLD, chronic systolic CHF,  PVD, CKD 2-3 was admitted on 1/26 right great toe infection. Pt states the toe redness has been going on for the past  6 weeks, but recently has been worse. States he took doxycycline with initial improvement, but then back worse. States  currently, having oozing discharge from the infection. States occasionally sharp throbbing pain from that toe. Denies any  fever, chills, CP, SOB, abd pain. States intact sensation. Pt was sent in for IV antibiotics by podiatrist.    Feels better  His left foot pain is improving  No fever or chills    1/31/17: pt seen and examined at bedside. States he feels good. very little pain today. scheduled for angio.    2/1/17: pt seen and examined at bedside. No overnight events. B/L common fem artery occlusions noted on angio. Will f/u with Dr Royal      SUBJECTIVE & OBJECTIVE: Pt seen and examined at bedside.     T(C): 36.4, Max: 37.2 (01-31 @ 20:15)  HR: 70 (69 - 74)  BP: 108/50 (106/52 - 153/69)  RR: 16 (14 - 20)  SpO2: 95% (95% - 99%)  Wt(kg): --    MEDICATIONS  (STANDING):  allopurinol 100milliGRAM(s) Oral after dinner  aspirin  chewable 81milliGRAM(s) Oral daily  atorvastatin 5milliGRAM(s) Oral at bedtime  carvedilol 25milliGRAM(s) Oral every 12 hours  cholecalciferol 2000Unit(s) Oral daily  digoxin     Tablet 0.125milliGRAM(s) Oral at bedtime  dorzolamide 2%/timolol 0.5% Ophthalmic Solution 1Drop(s) Left EYE every 12 hours  insulin lispro (HumaLOG) corrective regimen sliding scale  SubCutaneous three times a day before meals  dextrose 5%. 1000milliLiter(s) IV Continuous <Continuous>  dextrose 50% Injectable 12.5Gram(s) IV Push once  dextrose 50% Injectable 25Gram(s) IV Push once  dextrose 50% Injectable 25Gram(s) IV Push once  latanoprost 0.005% Ophthalmic Solution 1Drop(s) Left EYE at bedtime  mexiletine 150milliGRAM(s) Oral every 12 hours  pantoprazole    Tablet 40milliGRAM(s) Oral before breakfast  piperacillin/tazobactam IVPB. 3.375Gram(s) IV Intermittent every 8 hours  lisinopril 5milliGRAM(s) Oral at bedtime  acetylcysteine  Oral Solution 1200milliGRAM(s) Oral every 12 hours  sodium chloride 0.9%. 1000milliLiter(s) IV Continuous <Continuous>  dextrose 5% + sodium chloride 0.9%. 1000milliLiter(s) IV Continuous <Continuous>  sodium chloride 0.9%. 1000milliLiter(s) IV Continuous <Continuous>  sodium chloride 0.9%. 1000milliLiter(s) IV Continuous <Continuous>    MEDICATIONS  (PRN):  dextrose Gel 1Dose(s) Oral once PRN Blood Glucose LESS THAN 70 milliGRAM(s)/deciliter  glucagon  Injectable 1milliGRAM(s) IntraMuscular once PRN Glucose LESS THAN 70 milligrams/deciliter  aluminum hydroxide/magnesium hydroxide/simethicone Suspension 30milliLiter(s) Oral every 6 hours PRN Dyspepsia  morphine  - Injectable 4milliGRAM(s) IV Push every 3 hours PRN Severe Pain (7 - 10)  ondansetron Injectable 4milliGRAM(s) IV Push every 6 hours PRN Nausea and/or Vomiting  oxyCODONE IR 5milliGRAM(s) Oral every 4 hours PRN Moderate Pain (4 - 6)  acetaminophen   Tablet. 650milliGRAM(s) Oral every 6 hours PRN Mild Pain (1 - 3)      LABS:                        11.7   10.9  )-----------( 132      ( 31 Jan 2017 21:39 )             35.3     01 Feb 2017 06:58    142    |  110    |  29     ----------------------------<  89     4.3     |  25     |  1.72     Ca    8.7        01 Feb 2017 06:58      PT/INR - ( 01 Feb 2017 06:58 )   PT: 13.7 sec;   INR: 1.24 ratio         PTT - ( 01 Feb 2017 06:58 )  PTT:31.8 sec      CAPILLARY BLOOD GLUCOSE  98 (01 Feb 2017 08:09)  99 (31 Jan 2017 16:29)  105 (31 Jan 2017 12:25)  112 (31 Jan 2017 08:40)      CAPILLARY BLOOD GLUCOSE  98 (01 Feb 2017 08:09)  99 (31 Jan 2017 16:29)  105 (31 Jan 2017 12:25)  112 (31 Jan 2017 08:40)    CAPILLARY BLOOD GLUCOSE  98 (01 Feb 2017 08:09)            RECENT CULTURES:      RADIOLOGY & ADDITIONAL TESTS:      PHYSICAL EXAM:    GENERAL: NAD, well-groomed, well-developed  HEAD:  Atraumatic, Normocephalic  EYES: EOMI, PERRLA, conjunctiva and sclera clear  ENMT: Moist mucous membranes  NECK: Supple, No JVD  NERVOUS SYSTEM:  Alert & Oriented X3, Motor Strength 5/5 B/L upper and lower extremities; DTRs 2+ intact and symmetric  CHEST/LUNG: Clear to auscultation bilaterally; No rales, rhonchi, wheezing, or rubs  HEART: Regular rate and rhythm; No murmurs, rubs, or gallops  ABDOMEN: Soft, Nontender, Nondistended; Bowel sounds present  EXTREMITIES:  2+ Peripheral Pulses, No clubbing, cyanosis, or edema    Daily Height in cm: 175.26 (31 Jan 2017 14:14)    Daily       allopurinol 100milliGRAM(s) Oral after dinner  aspirin  chewable 81milliGRAM(s) Oral daily  atorvastatin 5milliGRAM(s) Oral at bedtime  carvedilol 25milliGRAM(s) Oral every 12 hours  cholecalciferol 2000Unit(s) Oral daily  digoxin     Tablet 0.125milliGRAM(s) Oral at bedtime  dorzolamide 2%/timolol 0.5% Ophthalmic Solution 1Drop(s) Left EYE every 12 hours  insulin lispro (HumaLOG) corrective regimen sliding scale  SubCutaneous three times a day before meals  dextrose 5%. 1000milliLiter(s) IV Continuous <Continuous>  dextrose Gel 1Dose(s) Oral once PRN  dextrose 50% Injectable 12.5Gram(s) IV Push once  dextrose 50% Injectable 25Gram(s) IV Push once  dextrose 50% Injectable 25Gram(s) IV Push once  glucagon  Injectable 1milliGRAM(s) IntraMuscular once PRN  latanoprost 0.005% Ophthalmic Solution 1Drop(s) Left EYE at bedtime  mexiletine 150milliGRAM(s) Oral every 12 hours  pantoprazole    Tablet 40milliGRAM(s) Oral before breakfast  piperacillin/tazobactam IVPB. 3.375Gram(s) IV Intermittent every 8 hours  aluminum hydroxide/magnesium hydroxide/simethicone Suspension 30milliLiter(s) Oral every 6 hours PRN  lisinopril 5milliGRAM(s) Oral at bedtime  morphine  - Injectable 4milliGRAM(s) IV Push every 3 hours PRN  ondansetron Injectable 4milliGRAM(s) IV Push every 6 hours PRN  oxyCODONE IR 5milliGRAM(s) Oral every 4 hours PRN  acetaminophen   Tablet. 650milliGRAM(s) Oral every 6 hours PRN  acetylcysteine  Oral Solution 1200milliGRAM(s) Oral every 12 hours  sodium chloride 0.9%. 1000milliLiter(s) IV Continuous <Continuous>  dextrose 5% + sodium chloride 0.9%. 1000milliLiter(s) IV Continuous <Continuous>  sodium chloride 0.9%. 1000milliLiter(s) IV Continuous <Continuous>  sodium chloride 0.9%. 1000milliLiter(s) IV Continuous <Continuous>              DVT/GI ppx  Discussed with pt @ bedside

## 2017-02-01 NOTE — PROGRESS NOTE ADULT - SUBJECTIVE AND OBJECTIVE BOX
Pt has been seen and examined with FP resident, resident supervised agree with a/p       HPI-Patient is a 76y old  Male who presents with a chief complaint of great toe infection        LABS:                                     11.7   10.9  )-----------( 132      ( 31 Jan 2017 21:39 )             35.3            10.9   7.7   )-----------( 118      ( 31 Jan 2017 06:46 )             33.5   01 Feb 2017 06:58    142    |  110    |  29     ----------------------------<  89     4.3     |  25     |  1.72     Ca    8.7        01 Feb 2017 06:58          PHYSICAL EXAM:  Vital Signs Last 24 Hrs  T(C): 36.4, Max: 36.8 (01-30 @ 17:09)  T(F): 97.5, Max: 98.3 (01-31 @ 08:39)  HR: 70 (69 - 72)  BP: 153/69 (118/58 - 153/69)  RR: 16 (16 - 18)  SpO2: 99% (96% - 99%)  GENERAL: comfortable   HEAD:  Atraumatic, Normocephalic  EYES: EOMI, PERRLA, conjunctiva and sclera clear  HEENT: Moist mucous membranes  NECK: Supple, No JVD  NERVOUS SYSTEM:  Alert & Oriented X3, Motor Strength 5/5 B/L upper and lower extremities; DTRs 2+ intact and symmetric  CHEST/LUNG: Clear to auscultation bilaterally; No rales, rhonchi, wheezing, or rubs  HEART: Regular rate and rhythm; No murmurs, rubs, or gallops  ABDOMEN: Soft, Nontender, Nondistended; Bowel sounds present  GENITOURINARY- no palpable bladder  EXTREMITIES:  No asymmetrical swelling noted       A/P      #Right halux cellulitis with dry ulcer  -ct abx   --s/p angiogram yesterday   -Dr. Barbosa follow up requested       #Kidney failure  -nephrology evaluation       #dvt pr

## 2017-02-01 NOTE — PROGRESS NOTE ADULT - SUBJECTIVE AND OBJECTIVE BOX
complains of R 1st toe pain    No L groin pain or discomfort    No significant changes R foot; no hematoma L groin, L foot unchanged

## 2017-02-01 NOTE — PROGRESS NOTE ADULT - PROBLEM SELECTOR PROBLEM 1
PVD (peripheral vascular disease) with claudication
Diabetes mellitus
PVD (peripheral vascular disease) with claudication

## 2017-02-01 NOTE — PROGRESS NOTE ADULT - ASSESSMENT
77 y/o male with h/o VT s/p AICD, A.Fib on AC, CAD s/p CABG, DM type 2, gout, glaucoma, HLD, chronic systolic CHF,  PVD, CKD 2-3 was admitted on 1/26 right great toe infection. Pt states the toe redness has been going on for the past  6 weeks, but recently has been worse. States he took doxycycline with initial improvement, but then back worse. States  currently, having oozing discharge from the infection. States occasionally sharp throbbing pain from that toe. Denies any  fever, chills, CP, SOB, abd pain. States intact sensation. Pt was sent in for IV antibiotics by podiatrist.  1. Right halux cellulitis with dry ulcer. Likely severe PVD. CRF stage 3.  -erythema is improving; toe remains slightly cold  -f/u BC x 2  -on zosyn 3.375 mg IV q8h (infused over 4h) # 6  -toleating abx well so far  -change abx to doxycycline 100 mg PO q12h for 8 more days  -vascular evaluation appreciated  -monitor BMP  -monitor temps  -f/u CBC  -supportive care  2. Other issues: VT s/p AICD, A.Fib on AC, CAD s/p CABG, DM type 2, gout, glaucoma, HLD, chronic systolic CHF,  PVD, CKD 2-3  -care per medicine

## 2017-02-01 NOTE — PROGRESS NOTE ADULT - ASSESSMENT
75 M with CAD s/p CABG, CHF s/p AICD and PPM, Afib on Coumadin, DM, CKD (unknown baseline), PVD, HTN, HLD sent to admitted for IV abx due to infection of right 1st toe.     #) Cellulitis of 1st toe of right foot   - Pt went for Angiogram on yesterday with Dr. Royal which showed common femoral artery occlusions that are ammendable to endartectomy and patch angioplasty. Future  management will be coordinated between Dr. Gamble and Dr Wesley. Awaiting official result of bone scan.  - increase Vancomycin to 750 mg IV and continue zosyn  - pt  hydrated overnight after angio    #PVD  - pt went  for angio yesterday  with  Dr Royal. Future endardectomy and patch angioplasty to be coordinated with podiatry    #Afib  - Coumadin restarted    #)CAD /CHF  -Lisinopril, carvedilil, ASA 81 digoxin    #)DM   continue ISS and BGM    #) GOut  - Allopurinol    D/W  75 M with CAD s/p CABG, CHF s/p AICD and PPM, Afib on Coumadin, DM, CKD (unknown baseline), PVD, HTN, HLD sent to admitted for IV abx due to infection of right 1st toe.     #) Cellulitis of 1st toe of right foot   - Pt went for Angiogram on yesterday with Dr. Royal which showed common femoral artery occlusions that are ammendable to endartectomy and patch angioplasty. Future  management will be coordinated between Dr. Gamble and Dr Wesley. Awaiting official result of bone scan.  - increase Vancomycin to 750 mg IV and continue zosyn  - pt  hydrated overnight after angio    #PVD  - pt went  for angio yesterday  with  Dr Royal. Future endardectomy and patch angioplasty to be coordinated with podiatry    #Afib  - Coumadin restarted    #)CAD /CHF  -Lisinopril, carvedilil, ASA 81 digoxin    #)DM   continue ISS and BGM    #) GOut  - Allopurinol    # Discharge planning: Pt medically cleared for discharge. Will contact Dr Royal regarding coordinating outpatient management.    D/W

## 2017-02-01 NOTE — PROGRESS NOTE ADULT - ASSESSMENT
bilateral common femoral artery occlusions amenable to endarterectomy and patch angioplasty    will discuss with Dr. Olivo patients candidacy for procedure    re check renal function, HCT stable

## 2017-02-01 NOTE — PROGRESS NOTE ADULT - SUBJECTIVE AND OBJECTIVE BOX
Patient is a 76y old  Male who presents with a chief complaint of   HPI:  77 y/o male with h/o VT s/p AICD, A.Fib on AC, CAD s/p CABG, DM type 2, gout, glaucoma, HLD, chronic systolic CHF,  PVD, CKD 2-3 was admitted on 1/26 right great toe infection. Pt states the toe redness has been going on for the past  6 weeks, but recently has been worse. States he took doxycycline with initial improvement, but then back worse. States  currently, having oozing discharge from the infection. States occasionally sharp throbbing pain from that toe. Denies any  fever, chills, CP, SOB, abd pain. States intact sensation. Pt was sent in for IV antibiotics by podiatrist.    Feels better  Denies foot pain  s/p angiogram  No fever or chills  No side effects    MEDICATIONS  (STANDING):  allopurinol 100milliGRAM(s) Oral after dinner  aspirin  chewable 81milliGRAM(s) Oral daily  atorvastatin 5milliGRAM(s) Oral at bedtime  carvedilol 25milliGRAM(s) Oral every 12 hours  cholecalciferol 2000Unit(s) Oral daily  digoxin     Tablet 0.125milliGRAM(s) Oral at bedtime  dorzolamide 2%/timolol 0.5% Ophthalmic Solution 1Drop(s) Left EYE every 12 hours  insulin lispro (HumaLOG) corrective regimen sliding scale  SubCutaneous three times a day before meals  dextrose 5%. 1000milliLiter(s) IV Continuous <Continuous>  dextrose 50% Injectable 12.5Gram(s) IV Push once  dextrose 50% Injectable 25Gram(s) IV Push once  dextrose 50% Injectable 25Gram(s) IV Push once  latanoprost 0.005% Ophthalmic Solution 1Drop(s) Left EYE at bedtime  mexiletine 150milliGRAM(s) Oral every 12 hours  pantoprazole    Tablet 40milliGRAM(s) Oral before breakfast  piperacillin/tazobactam IVPB. 3.375Gram(s) IV Intermittent every 8 hours  lisinopril 5milliGRAM(s) Oral at bedtime  acetylcysteine  Oral Solution 1200milliGRAM(s) Oral every 12 hours  sodium chloride 0.9%. 1000milliLiter(s) IV Continuous <Continuous>  dextrose 5% + sodium chloride 0.9%. 1000milliLiter(s) IV Continuous <Continuous>  sodium chloride 0.9%. 1000milliLiter(s) IV Continuous <Continuous>  sodium chloride 0.9%. 1000milliLiter(s) IV Continuous <Continuous>  warfarin 5milliGRAM(s) Oral daily    MEDICATIONS  (PRN):  dextrose Gel 1Dose(s) Oral once PRN Blood Glucose LESS THAN 70 milliGRAM(s)/deciliter  glucagon  Injectable 1milliGRAM(s) IntraMuscular once PRN Glucose LESS THAN 70 milligrams/deciliter  aluminum hydroxide/magnesium hydroxide/simethicone Suspension 30milliLiter(s) Oral every 6 hours PRN Dyspepsia  morphine  - Injectable 4milliGRAM(s) IV Push every 3 hours PRN Severe Pain (7 - 10)  ondansetron Injectable 4milliGRAM(s) IV Push every 6 hours PRN Nausea and/or Vomiting  oxyCODONE IR 5milliGRAM(s) Oral every 4 hours PRN Moderate Pain (4 - 6)  acetaminophen   Tablet. 650milliGRAM(s) Oral every 6 hours PRN Mild Pain (1 - 3)      Vital Signs Last 24 Hrs  T(C): 36.4, Max: 37.2 (01-31 @ 20:15)  T(F): 97.5, Max: 98.9 (01-31 @ 20:15)  HR: 70 (69 - 74)  BP: 108/50 (106/52 - 153/69)  BP(mean): --  RR: 16 (14 - 20)  SpO2: 95% (95% - 99%)    Physical Exam:      Daily   Constitutional: frail looking  HEENT: NC/AT, EOMI, PERRLA  Neck: supple  Back: no tenderness  Respiratory: clear  Cardiovascular: S1S2 regular, no murmurs  Abdomen: soft, not tender, not distended, positive BS  Genitourinary: deferred  Rectal: deferred  Musculoskeletal: no muscle tenderness, no joint swelling or tenderness  Extremities: no pedal edema; left foot erythema and edema is improved; left great toe dry superficial ulcer; toe is colder  Neurological: AxOx3, moving all extremities, no focal deficits  Skin: no rashes    Labs:                        11.7   10.9  )-----------( 132      ( 31 Jan 2017 21:39 )             35.3     01 Feb 2017 06:58    142    |  110    |  29     ----------------------------<  89     4.3     |  25     |  1.72     Ca    8.7        01 Feb 2017 06:58         Vancomycin Level, Trough: 9.5 ug/mL (02-01 @ 06:58)      Cultures:                       10.9   7.7   )-----------( 118      ( 31 Jan 2017 06:46 )             33.5     31 Jan 2017 06:46    142    |  108    |  28     ----------------------------<  112    4.0     |  25     |  1.80     Ca    8.5        31 Jan 2017 06:46             Cultures:                         11.1   8.5   )-----------( 114      ( 30 Jan 2017 06:40 )             33.9     30 Jan 2017 06:40    140    |  106    |  25     ----------------------------<  122    3.9     |  24     |  1.75     Ca    8.6        30 Jan 2017 06:40    Vancomycin Level, Trough (01.29.17 @ 10:32)    Vancomycin Level, Trough: 8.8: Vancomycin trough levels should be rapidly reached and maintained at  15-20 ug/ml for life threatening MRSA  infections such as sepsis, endocarditis, osteomyelitis and pneumonia. A  first trough level should be drawn  before the 3rd or 4th dose.  Risk8.8:  of renal toxicity is increased for levels >15 ug/ml, in patients on  other nephrotoxic drugs, who are  hemodynamically unstable, have unstable renal function, or are on  Vancomycin therapy for >14 days. Renal function with  creatinine levels should b8.8: e monitored for those patients. ug/mL            Radiology:  bone scan reported with no evidence of OM (spoken with nuclear medicine RN Jaycee)

## 2017-02-02 ENCOUNTER — TRANSCRIPTION ENCOUNTER (OUTPATIENT)
Age: 77
End: 2017-02-02

## 2017-02-02 VITALS — WEIGHT: 154.1 LBS

## 2017-02-02 LAB
APTT BLD: 33.1 SEC — SIGNIFICANT CHANGE UP (ref 27.5–37.4)
INR BLD: 1.23 RATIO — HIGH (ref 0.88–1.16)
PROTHROM AB SERPL-ACNC: 13.6 SEC — HIGH (ref 10–13.1)

## 2017-02-02 RX ORDER — ENOXAPARIN SODIUM 100 MG/ML
0.7 INJECTION SUBCUTANEOUS
Qty: 1 | Refills: 0 | OUTPATIENT
Start: 2017-02-02 | End: 2017-02-06

## 2017-02-02 RX ORDER — ENOXAPARIN SODIUM 100 MG/ML
70 INJECTION SUBCUTANEOUS ONCE
Qty: 0 | Refills: 0 | Status: DISCONTINUED | OUTPATIENT
Start: 2017-02-02 | End: 2017-02-02

## 2017-02-02 RX ORDER — CHOLECALCIFEROL (VITAMIN D3) 125 MCG
2000 CAPSULE ORAL
Qty: 0 | Refills: 0 | COMMUNITY
Start: 2017-02-02

## 2017-02-02 RX ORDER — MEXILETINE HYDROCHLORIDE 150 MG/1
1 CAPSULE ORAL
Qty: 60 | Refills: 0 | OUTPATIENT
Start: 2017-02-02 | End: 2017-03-04

## 2017-02-02 RX ORDER — ENOXAPARIN SODIUM 100 MG/ML
70 INJECTION SUBCUTANEOUS ONCE
Qty: 0 | Refills: 0 | Status: COMPLETED | OUTPATIENT
Start: 2017-02-02 | End: 2017-02-02

## 2017-02-02 RX ORDER — ENOXAPARIN SODIUM 100 MG/ML
0.7 INJECTION SUBCUTANEOUS
Qty: 8 | Refills: 0 | OUTPATIENT
Start: 2017-02-02 | End: 2017-02-06

## 2017-02-02 RX ORDER — LISINOPRIL 2.5 MG/1
1 TABLET ORAL
Qty: 0 | Refills: 0 | COMMUNITY
Start: 2017-02-02

## 2017-02-02 RX ORDER — LISINOPRIL 2.5 MG/1
1 TABLET ORAL
Qty: 5 | Refills: 0 | OUTPATIENT
Start: 2017-02-02 | End: 2017-02-07

## 2017-02-02 RX ADMIN — Medication 100 MILLIGRAM(S): at 08:42

## 2017-02-02 RX ADMIN — MEXILETINE HYDROCHLORIDE 150 MILLIGRAM(S): 150 CAPSULE ORAL at 05:09

## 2017-02-02 RX ADMIN — Medication 2000 UNIT(S): at 12:18

## 2017-02-02 RX ADMIN — DORZOLAMIDE HYDROCHLORIDE TIMOLOL MALEATE 1 DROP(S): 20; 5 SOLUTION/ DROPS OPHTHALMIC at 05:12

## 2017-02-02 RX ADMIN — Medication 1200 MILLIGRAM(S): at 05:10

## 2017-02-02 RX ADMIN — PANTOPRAZOLE SODIUM 40 MILLIGRAM(S): 20 TABLET, DELAYED RELEASE ORAL at 12:20

## 2017-02-02 RX ADMIN — CARVEDILOL PHOSPHATE 25 MILLIGRAM(S): 80 CAPSULE, EXTENDED RELEASE ORAL at 05:09

## 2017-02-02 RX ADMIN — ENOXAPARIN SODIUM 70 MILLIGRAM(S): 100 INJECTION SUBCUTANEOUS at 15:59

## 2017-02-02 RX ADMIN — Medication 650 MILLIGRAM(S): at 05:10

## 2017-02-02 NOTE — PROGRESS NOTE ADULT - SUBJECTIVE AND OBJECTIVE BOX
Patient is a 76y old  Male who presents with a chief complaint of cellulitis/ rule out osteomyelitis (2017 13:05)        HPI:75 y/o male with h/o VT s/p AICD, A.Fib on AC, CAD s/p CABG, DM type 2, gout, glaucoma, HLD, chronic systolic CHF,  PVD, CKD 2-3 was admitted on  right great toe infection. Pt states the toe redness has been going on for the past  6 weeks, but recently has been worse. States he took doxycycline with initial improvement, but then back worse. States  currently, having oozing discharge from the infection. States occasionally sharp throbbing pain from that toe. Denies any  fever, chills, CP, SOB, abd pain. States intact sensation. Pt was sent in for IV antibiotics by podiatrist.    Feels better  His left foot pain is improving  No fever or chills    17: pt seen and examined at bedside. States he feels good. very little pain today. scheduled for angio.    17: pt seen and examined at bedside. No overnight events. B/L common fem artery occlusions noted on angio. Will f/u with Dr Royal    17: pt seen and examined at bedside. No complaints. Tolerating antibiotics without any issue. Pt to be discharged home today on lovenox sq and will be following up with the office of Dr Royal for elective admission on Monday for surgery            T(C): 36.8, Max: 36.8 (- @ 16:19)  HR: 70 (69 - 70)  BP: 127/54 (102/78 - 127/54)  RR: 18 (17 - 18)  SpO2: 99% (95% - 99%)  Wt(kg): --    MEDICATIONS  (STANDING):  allopurinol 100milliGRAM(s) Oral after dinner  aspirin  chewable 81milliGRAM(s) Oral daily  atorvastatin 5milliGRAM(s) Oral at bedtime  carvedilol 25milliGRAM(s) Oral every 12 hours  cholecalciferol 2000Unit(s) Oral daily  digoxin     Tablet 0.125milliGRAM(s) Oral at bedtime  dorzolamide 2%/timolol 0.5% Ophthalmic Solution 1Drop(s) Left EYE every 12 hours  insulin lispro (HumaLOG) corrective regimen sliding scale  SubCutaneous three times a day before meals  dextrose 5%. 1000milliLiter(s) IV Continuous <Continuous>  dextrose 50% Injectable 12.5Gram(s) IV Push once  dextrose 50% Injectable 25Gram(s) IV Push once  dextrose 50% Injectable 25Gram(s) IV Push once  latanoprost 0.005% Ophthalmic Solution 1Drop(s) Left EYE at bedtime  mexiletine 150milliGRAM(s) Oral every 12 hours  pantoprazole    Tablet 40milliGRAM(s) Oral before breakfast  lisinopril 5milliGRAM(s) Oral at bedtime  acetylcysteine  Oral Solution 1200milliGRAM(s) Oral every 12 hours  sodium chloride 0.9%. 1000milliLiter(s) IV Continuous <Continuous>  dextrose 5% + sodium chloride 0.9%. 1000milliLiter(s) IV Continuous <Continuous>  sodium chloride 0.9%. 1000milliLiter(s) IV Continuous <Continuous>  sodium chloride 0.9%. 1000milliLiter(s) IV Continuous <Continuous>  warfarin 5milliGRAM(s) Oral daily  doxycycline hyclate Capsule 100milliGRAM(s) Oral every 12 hours    MEDICATIONS  (PRN):  dextrose Gel 1Dose(s) Oral once PRN Blood Glucose LESS THAN 70 milliGRAM(s)/deciliter  glucagon  Injectable 1milliGRAM(s) IntraMuscular once PRN Glucose LESS THAN 70 milligrams/deciliter  aluminum hydroxide/magnesium hydroxide/simethicone Suspension 30milliLiter(s) Oral every 6 hours PRN Dyspepsia  morphine  - Injectable 4milliGRAM(s) IV Push every 3 hours PRN Severe Pain (7 - 10)  ondansetron Injectable 4milliGRAM(s) IV Push every 6 hours PRN Nausea and/or Vomiting  oxyCODONE IR 5milliGRAM(s) Oral every 4 hours PRN Moderate Pain (4 - 6)  acetaminophen   Tablet. 650milliGRAM(s) Oral every 6 hours PRN Mild Pain (1 - 3)      LABS:                        10.4   7.5   )-----------( 126      ( 2017 16:07 )             31.5     2017 16:07    142    |  111    |  33     ----------------------------<  136    4.2     |  25     |  1.96     Ca    8.5        2017 16:07    TPro  5.7    /  Alb  2.8    /  TBili  0.5    /  DBili  x      /  AST  18     /  ALT  22     /  AlkPhos  45     2017 16:07    PT/INR - ( 2017 07:38 )   PT: 13.6 sec;   INR: 1.23 ratio         PTT - ( 2017 07:38 )  PTT:33.1 sec      CAPILLARY BLOOD GLUCOSE  131 (2017 12:23)  104 (2017 08:42)  124 (2017 16:19)      CAPILLARY BLOOD GLUCOSE  131 (2017 12:23)  104 (2017 08:42)  124 (2017 16:19)    CAPILLARY BLOOD GLUCOSE  131 (2017 12:23)            RECENT CULTURES:      RADIOLOGY & ADDITIONAL TESTS:      PHYSICAL EXAM:    GENERAL: NAD, well-groomed, well-developed  HEAD:  Atraumatic, Normocephalic  EYES: EOMI, PERRLA, conjunctiva and sclera clear  ENMT: Moist mucous membranes  NECK: Supple, No JVD  NERVOUS SYSTEM:  Alert & Oriented X3, Motor Strength 5/5 B/L upper and lower extremities; DTRs 2+ intact and symmetric  CHEST/LUNG: Clear to auscultation bilaterally; No rales, rhonchi, wheezing, or rubs  HEART: Regular rate and rhythm; No murmurs, rubs, or gallops  ABDOMEN: Soft, Nontender, Nondistended; Bowel sounds present  EXTREMITIES:  2+ Peripheral Pulses, No clubbing, cyanosis, or edema    Daily     Daily Weight in k.9 (2017 13:05)      allopurinol 100milliGRAM(s) Oral after dinner  aspirin  chewable 81milliGRAM(s) Oral daily  atorvastatin 5milliGRAM(s) Oral at bedtime  carvedilol 25milliGRAM(s) Oral every 12 hours  cholecalciferol 2000Unit(s) Oral daily  digoxin     Tablet 0.125milliGRAM(s) Oral at bedtime  dorzolamide 2%/timolol 0.5% Ophthalmic Solution 1Drop(s) Left EYE every 12 hours  insulin lispro (HumaLOG) corrective regimen sliding scale  SubCutaneous three times a day before meals  dextrose 5%. 1000milliLiter(s) IV Continuous <Continuous>  dextrose Gel 1Dose(s) Oral once PRN  dextrose 50% Injectable 12.5Gram(s) IV Push once  dextrose 50% Injectable 25Gram(s) IV Push once  dextrose 50% Injectable 25Gram(s) IV Push once  glucagon  Injectable 1milliGRAM(s) IntraMuscular once PRN  latanoprost 0.005% Ophthalmic Solution 1Drop(s) Left EYE at bedtime  mexiletine 150milliGRAM(s) Oral every 12 hours  pantoprazole    Tablet 40milliGRAM(s) Oral before breakfast  aluminum hydroxide/magnesium hydroxide/simethicone Suspension 30milliLiter(s) Oral every 6 hours PRN  lisinopril 5milliGRAM(s) Oral at bedtime  morphine  - Injectable 4milliGRAM(s) IV Push every 3 hours PRN  ondansetron Injectable 4milliGRAM(s) IV Push every 6 hours PRN  oxyCODONE IR 5milliGRAM(s) Oral every 4 hours PRN  acetaminophen   Tablet. 650milliGRAM(s) Oral every 6 hours PRN  acetylcysteine  Oral Solution 1200milliGRAM(s) Oral every 12 hours  sodium chloride 0.9%. 1000milliLiter(s) IV Continuous <Continuous>  dextrose 5% + sodium chloride 0.9%. 1000milliLiter(s) IV Continuous <Continuous>  sodium chloride 0.9%. 1000milliLiter(s) IV Continuous <Continuous>  sodium chloride 0.9%. 1000milliLiter(s) IV Continuous <Continuous>  warfarin 5milliGRAM(s) Oral daily  doxycycline hyclate Capsule 100milliGRAM(s) Oral every 12 hours              DVT/GI ppx  Discussed with pt @ bedside

## 2017-02-02 NOTE — DISCHARGE NOTE ADULT - PLAN OF CARE
prevention of further infection of foot or possible bone infection follow up with Dr Royal regarding elective admission on monday

## 2017-02-02 NOTE — DISCHARGE NOTE ADULT - CARE PLAN
Principal Discharge DX:	Cellulitis and abscess of foot  Goal:	prevention of further infection of foot or possible bone infection  Instructions for follow-up, activity and diet:	follow up with Dr Royal regarding elective admission on monday

## 2017-02-02 NOTE — DISCHARGE NOTE ADULT - ADDITIONAL INSTRUCTIONS
metformin held. Follow up with PMD to follow up renal fucntion before restarting Metformin. Control diabetes with diet

## 2017-02-02 NOTE — PROGRESS NOTE ADULT - ASSESSMENT
75 M with CAD s/p CABG, CHF s/p AICD and PPM, Afib on Coumadin, DM, CKD (unknown baseline), PVD, HTN, HLD sent to admitted for IV abx due to infection of right 1st toe.     #) Cellulitis of 1st toe of right foot   - Pt went for Angiogram on yesterday with Dr. Royal which showed common femoral artery occlusions that are ammendable to endartectomy and patch angioplasty. Future  management will be coordinated between Dr. Gamble and Dr Wesley. Awaiting official result of bone scan.  - discontinue IV abx and start PO doxy. Pt will be discharged on 7 days of PO doxy  - pt to be discharged home today. Coumadin will be held and lovenox started. Pt will follow up with Dr Royal's office to coordinate elective admission on monday for surgery( endardectomy vs angioplasty)    #PVD  - pt went  for angio yesterday  with  Dr Royal. Future endardectomy and patch angioplasty to be coordinated with podiatry  -pt to be discharged home today. Coumadin will be held and lovenox started. Pt will follow up with Dr Royal's office to coordinate elective admission on monday for surgery( endardectomy vs angioplasty)    #Afib  - Coumadin held  - pt will go home on lovenox    #)CAD /CHF  -Lisinopril, carvedilil, ASA 81 digoxin    #)DM  - stop ISS and start metformin    #) GOut  - Allopurinol    # Discharge planning: Pt medically cleared for discharge. Pt to be discharged home and will be electively admitted on Monday for Surgery. Pt will be discharged on PO doxy and lovenox sq BID    D/W  75 M with CAD s/p CABG, CHF s/p AICD and PPM, Afib on Coumadin, DM, CKD (unknown baseline), PVD, HTN, HLD sent to admitted for IV abx due to infection of right 1st toe.     #) Cellulitis of 1st toe of right foot   - Pt went for Angiogram on yesterday with Dr. Royal which showed common femoral artery occlusions that are ammendable to endartectomy and patch angioplasty. Future  management will be coordinated between Dr. Gamble and Dr Wesley. Awaiting official result of bone scan.  - discontinue IV abx and start PO doxy. Pt will be discharged on 7 days of PO doxy  - pt to be discharged home today. Coumadin will be held and lovenox started. Pt will follow up with Dr Royal's office to coordinate elective admission on monday for surgery( endardectomy vs angioplasty)    #PVD  - pt went  for angio yesterday  with  Dr Royal. Future endardectomy and patch angioplasty to be coordinated with podiatry  -pt to be discharged home today. Coumadin will be held and lovenox started. Pt will follow up with Dr Royal's office to coordinate elective admission on monday for surgery( endardectomy vs angioplasty)    #Afib  - Coumadin held  - pt will go home on lovenox    #)CAD /CHF  -Lisinopril, carvedilil, ASA 81 digoxin    #)DM  - stop ISS and hold metformin  - pt encouraged control DM with diet and follow up with PMD to check renal function prior to restarting Metformin    #) GOut  - Allopurinol    # Discharge planning: Pt medically cleared for discharge. Pt to be discharged home and will be electively admitted on Monday for Surgery. Pt will be discharged on PO doxy and lovenox sq BID    D/W

## 2017-02-02 NOTE — DISCHARGE NOTE ADULT - MEDICATION SUMMARY - MEDICATIONS TO TAKE
I will START or STAY ON the medications listed below when I get home from the hospital:    Aspir 81 81 mg oral tablet  -- 1 tab(s) by mouth once a day (at bedtime)  -- Indication: For PVD (peripheral vascular disease) with claudication    lisinopril 5 mg oral tablet  -- 1 tab(s) by mouth once a day (at bedtime)  -- Indication: For Disorder of kidney and ureter    lisinopril 5 mg oral tablet  -- 1 tab(s) by mouth once a day (at bedtime)  -- Indication: For Disorder of kidney and ureter    digoxin 125 mcg (0.125 mg) oral tablet  -- 1 tab(s) by mouth once a day (at bedtime)  -- Indication: For PVD (peripheral vascular disease) with claudication    metFORMIN 500 mg oral tablet  -- 0.5 tab(s) by mouth once a day (in the morning) with breakfast  -- Indication: For Diabetes mellitus    allopurinol 100 mg oral tablet  -- 1 tab(s) by mouth once a day (at bedtime)  -- Indication: For HYPERGLYCEMIA RIGHT GREAT TOE INFECTION    atorvastatin  -- 5 milligram(s) by mouth Take on Monday/Tuesday/Thursday/Friday  -- Indication: For PVD (peripheral vascular disease) with claudication    doxycycline monohydrate 100 mg oral capsule  -- 1 cap(s) by mouth every 12 hours  -- Indication: For Local infection of skin and subcutaneous tissue    doxycycline monohydrate 100 mg oral capsule  -- 1 cap(s) by mouth every 12 hours  -- Indication: For Local infection of skin and subcutaneous tissue    Coreg 25 mg oral tablet  -- 1 tab(s) by mouth 2 times a day  -- Indication: For PVD (peripheral vascular disease) with claudication    dorzolamide-timolol 2%-0.5% preservative-free ophthalmic solution  -- 1 drop(s) to each affected eye 2 times a day  -- Indication: For Diabetes mellitus    latanoprost 0.005% ophthalmic solution  -- 1 drop(s) to each affected eye once a day (at bedtime)  -- Indication: For Diabetes mellitus    cholecalciferol oral tablet  -- 2000 unit(s) by mouth once a day  -- Indication: For Diabetes mellitus I will START or STAY ON the medications listed below when I get home from the hospital:    Aspir 81 81 mg oral tablet  -- 1 tab(s) by mouth once a day (at bedtime)  -- Indication: For PVD (peripheral vascular disease) with claudication    lisinopril 5 mg oral tablet  -- 1 tab(s) by mouth once a day (at bedtime)  -- Indication: For Disorder of kidney and ureter    lisinopril 5 mg oral tablet  -- 1 tab(s) by mouth once a day (at bedtime)  -- Indication: For Disorder of kidney and ureter    mexiletine 150 mg oral capsule  -- 1 cap(s) by mouth every 12 hours  -- Indication: For PVD (peripheral vascular disease) with claudication    digoxin 125 mcg (0.125 mg) oral tablet  -- 1 tab(s) by mouth once a day (at bedtime)  -- Indication: For PVD (peripheral vascular disease) with claudication    enoxaparin 80 mg/0.8 mL injectable solution  -- 0.7 milliliter(s) injectable 2 times a day  -- It is very important that you take or use this exactly as directed.  Do not skip doses or discontinue unless directed by your doctor.    -- Indication: For PVD (peripheral vascular disease) with claudication    allopurinol 100 mg oral tablet  -- 1 tab(s) by mouth once a day (at bedtime)  -- Indication: For HYPERGLYCEMIA RIGHT GREAT TOE INFECTION    atorvastatin  -- 5 milligram(s) by mouth Take on Monday/Tuesday/Thursday/Friday  -- Indication: For PVD (peripheral vascular disease) with claudication    doxycycline monohydrate 100 mg oral capsule  -- 1 cap(s) by mouth every 12 hours  -- Indication: For Local infection of skin and subcutaneous tissue    doxycycline monohydrate 100 mg oral capsule  -- 1 cap(s) by mouth every 12 hours  -- Indication: For Local infection of skin and subcutaneous tissue    Coreg 25 mg oral tablet  -- 1 tab(s) by mouth 2 times a day  -- Indication: For PVD (peripheral vascular disease) with claudication    dorzolamide-timolol 2%-0.5% preservative-free ophthalmic solution  -- 1 drop(s) to each affected eye 2 times a day  -- Indication: For Diabetes mellitus    latanoprost 0.005% ophthalmic solution  -- 1 drop(s) to each affected eye once a day (at bedtime)  -- Indication: For Diabetes mellitus    cholecalciferol oral tablet  -- 2000 unit(s) by mouth once a day  -- Indication: For Diabetes mellitus I will START or STAY ON the medications listed below when I get home from the hospital:    Aspir 81 81 mg oral tablet  -- 1 tab(s) by mouth once a day (at bedtime)  -- Indication: For PVD (peripheral vascular disease) with claudication    lisinopril 5 mg oral tablet  -- 1 tab(s) by mouth once a day (at bedtime)  -- Indication: For Disorder of kidney and ureter    lisinopril 5 mg oral tablet  -- 1 tab(s) by mouth once a day (at bedtime)  -- Indication: For Disorder of kidney and ureter    mexiletine 150 mg oral capsule  -- 1 cap(s) by mouth every 12 hours  -- Indication: For PVD (peripheral vascular disease) with claudication    digoxin 125 mcg (0.125 mg) oral tablet  -- 1 tab(s) by mouth once a day (at bedtime)  -- Indication: For PVD (peripheral vascular disease) with claudication    enoxaparin 80 mg/0.8 mL injectable solution  -- 0.7 milliliter(s) injectable once a day  -- It is very important that you take or use this exactly as directed.  Do not skip doses or discontinue unless directed by your doctor.    -- Indication: For PVD (peripheral vascular disease) with claudication    enoxaparin 80 mg/0.8 mL injectable solution  -- 0.7 milliliter(s) injectable 2 times a day  -- It is very important that you take or use this exactly as directed.  Do not skip doses or discontinue unless directed by your doctor.    -- Indication: For PVD (peripheral vascular disease) with claudication    allopurinol 100 mg oral tablet  -- 1 tab(s) by mouth once a day (at bedtime)  -- Indication: For HYPERGLYCEMIA RIGHT GREAT TOE INFECTION    atorvastatin  -- 5 milligram(s) by mouth Take on Monday/Tuesday/Thursday/Friday  -- Indication: For PVD (peripheral vascular disease) with claudication    doxycycline monohydrate 100 mg oral capsule  -- 1 cap(s) by mouth every 12 hours  -- Indication: For Local infection of skin and subcutaneous tissue    doxycycline monohydrate 100 mg oral capsule  -- 1 cap(s) by mouth every 12 hours  -- Indication: For Local infection of skin and subcutaneous tissue    Coreg 25 mg oral tablet  -- 1 tab(s) by mouth 2 times a day  -- Indication: For PVD (peripheral vascular disease) with claudication    dorzolamide-timolol 2%-0.5% preservative-free ophthalmic solution  -- 1 drop(s) to each affected eye 2 times a day  -- Indication: For Diabetes mellitus    latanoprost 0.005% ophthalmic solution  -- 1 drop(s) to each affected eye once a day (at bedtime)  -- Indication: For Diabetes mellitus    cholecalciferol oral tablet  -- 2000 unit(s) by mouth once a day  -- Indication: For Diabetes mellitus

## 2017-02-02 NOTE — PROGRESS NOTE ADULT - PROVIDER SPECIALTY LIST ADULT
Hospitalist
Infectious Disease
Infectious Disease
Podiatry
Vascular Surgery
Vascular Surgery
Hospitalist
Hospitalist
Family Medicine
Infectious Disease
Podiatry

## 2017-02-02 NOTE — DISCHARGE NOTE ADULT - HOSPITAL COURSE
pt was evaluated during this admission for cellulitis and worked up for possible underlying osteomyelitis which was essentially ruled out with a negative bone scan. Pt was evaluated by vascular surgery for chronic PVD and went for an angio which revealed b/l occlusions in the lower extremities. Pt will be discharged home on 1 week of antibiotics and encouraged to follow up with Dr Royal regarding elective admission for surgery on monday. Coumadin will be held as an outpatient and pt will be started on lovenox until surgery.

## 2017-02-02 NOTE — DISCHARGE NOTE ADULT - INSTRUCTIONS
Metformin was held in light of poor kidney function. Control diabetes with diet and follow up with PMD before restarting metformin change dressing daily, keep clean & dry

## 2017-02-02 NOTE — DISCHARGE NOTE ADULT - CARE PROVIDER_API CALL
Estuardo Olivo), Internal Medicine  [address] [facility] [facility]   [address]  [place] [address]  Phone: [phone]  Fax: [phone]    Daniel Royal), Vascular Surgery  [address] [place] [address] [place] [address]  Phone: [phone]  Fax: [phone]

## 2017-02-02 NOTE — DISCHARGE NOTE ADULT - PATIENT PORTAL LINK FT
“You can access the [facility] Patient Portal, offered by [facility], by registering with the following website: [email or web address]”

## 2017-02-02 NOTE — PROGRESS NOTE ADULT - SUBJECTIVE AND OBJECTIVE BOX
Patient is a 78y old  Female who presents with a chief complaint of fever and foot ulcer (01 Feb 2017 00:56)      HPI:  78/F with PMHx of HTN, HLD, CAD, right foot osteomyelitis, chronic right foot ulcers, was brought to er for c/o fevers on and off for the last few weeks. She had MRI done 3 weeks ago and she says it showed bone infection. She is on doxy for it.   No other complaints. (31 Jan 2017 09:42)      Allergies    adhesives (Other)  aspirin (Unknown)  Coumadin (Unknown)  penicillin (Fever; Rash; Hives)    Intolerances        MEDICATIONS  (STANDING):  atorvastatin 10milliGRAM(s) Oral at bedtime  triamterene 37.5 mG/hydrochlorothiazide 25 mG Capsule 1Capsule(s) Oral daily  metoprolol succinate ER 50milliGRAM(s) Oral daily  pantoprazole    Tablet 40milliGRAM(s) Oral before breakfast  heparin  Injectable 5000Unit(s) SubCutaneous every 8 hours  aztreonam  IVPB  IV Intermittent   aztreonam  IVPB 1000milliGRAM(s) IV Intermittent every 8 hours    MEDICATIONS  (PRN):  traMADol 50milliGRAM(s) Oral every 6 hours PRN Moderate Pain (4 - 6)  acetaminophen   Tablet 650milliGRAM(s) Oral every 6 hours PRN For Temp greater than 38 C (100.4 F)  diphenhydrAMINE   Injectable 25milliGRAM(s) IV Push every 6 hours PRN Rash        RADIOLOGY

## 2017-02-02 NOTE — PROGRESS NOTE ADULT - SUBJECTIVE AND OBJECTIVE BOX
Pt has been seen and examined with FP resident, resident supervised agree with a/p       Patient is a 76y old  Male who presents with a chief complaint of cellulitis      PHYSICAL EXAM:  Vital Signs Last 24 Hrs  T(C): 36.8, Max: 36.8 (02-01 @ 16:19)  T(F): 98.2, Max: 98.2 (02-01 @ 16:19)  HR: 70 (69 - 70)  BP: 127/54 (102/78 - 127/54)  BP(mean): --  RR: 18 (17 - 18)  SpO2: 99% (95% - 99%)    General- comfortable   -rs-aeeb,cta  -cvs-s1s2 normal   -p/a-soft,bs+  -extremity- no asymmetrical swelling noted   -cns- non focal         A/P    #DM- no metformin at discharge. Discussed with pt and advised to watch his diet and check with pmd for ongoing management     #Vascular occlusion- b/l common femoral artery   -Dr. Barbosa follow up appreciated, possible surgery on Monday Dr. Barbosa recommended lovenox as discharge meds     #d/c today     #time spent more than 30 minutes

## 2017-02-05 ENCOUNTER — RESULT REVIEW (OUTPATIENT)
Age: 77
End: 2017-02-05

## 2017-02-06 ENCOUNTER — INPATIENT (INPATIENT)
Facility: HOSPITAL | Age: 77
LOS: 4 days | Discharge: HOME HEALTH | End: 2017-02-11
Attending: THORACIC SURGERY (CARDIOTHORACIC VASCULAR SURGERY) | Admitting: THORACIC SURGERY (CARDIOTHORACIC VASCULAR SURGERY)
Payer: MEDICARE

## 2017-02-06 VITALS
DIASTOLIC BLOOD PRESSURE: 63 MMHG | RESPIRATION RATE: 16 BRPM | WEIGHT: 152.34 LBS | TEMPERATURE: 97 F | SYSTOLIC BLOOD PRESSURE: 129 MMHG | HEART RATE: 69 BPM | OXYGEN SATURATION: 100 % | HEIGHT: 69 IN

## 2017-02-06 DIAGNOSIS — Z95.810 PRESENCE OF AUTOMATIC (IMPLANTABLE) CARDIAC DEFIBRILLATOR: Chronic | ICD-10-CM

## 2017-02-06 DIAGNOSIS — M10.9 GOUT, UNSPECIFIED: ICD-10-CM

## 2017-02-06 DIAGNOSIS — Z95.1 PRESENCE OF AORTOCORONARY BYPASS GRAFT: ICD-10-CM

## 2017-02-06 DIAGNOSIS — I27.2 OTHER SECONDARY PULMONARY HYPERTENSION: ICD-10-CM

## 2017-02-06 DIAGNOSIS — H40.9 UNSPECIFIED GLAUCOMA: ICD-10-CM

## 2017-02-06 DIAGNOSIS — I49.01 VENTRICULAR FIBRILLATION: ICD-10-CM

## 2017-02-06 DIAGNOSIS — Z95.810 PRESENCE OF AUTOMATIC (IMPLANTABLE) CARDIAC DEFIBRILLATOR: ICD-10-CM

## 2017-02-06 DIAGNOSIS — I70.235 ATHEROSCLEROSIS OF NATIVE ARTERIES OF RIGHT LEG WITH ULCERATION OF OTHER PART OF FOOT: ICD-10-CM

## 2017-02-06 DIAGNOSIS — L03.031 CELLULITIS OF RIGHT TOE: ICD-10-CM

## 2017-02-06 DIAGNOSIS — I77.1 STRICTURE OF ARTERY: ICD-10-CM

## 2017-02-06 DIAGNOSIS — I50.22 CHRONIC SYSTOLIC (CONGESTIVE) HEART FAILURE: ICD-10-CM

## 2017-02-06 DIAGNOSIS — I25.10 ATHEROSCLEROTIC HEART DISEASE OF NATIVE CORONARY ARTERY WITHOUT ANGINA PECTORIS: ICD-10-CM

## 2017-02-06 DIAGNOSIS — I48.91 UNSPECIFIED ATRIAL FIBRILLATION: ICD-10-CM

## 2017-02-06 DIAGNOSIS — I13.0 HYPERTENSIVE HEART AND CHRONIC KIDNEY DISEASE WITH HEART FAILURE AND STAGE 1 THROUGH STAGE 4 CHRONIC KIDNEY DISEASE, OR UNSPECIFIED CHRONIC KIDNEY DISEASE: ICD-10-CM

## 2017-02-06 DIAGNOSIS — N18.3 CHRONIC KIDNEY DISEASE, STAGE 3 (MODERATE): ICD-10-CM

## 2017-02-06 DIAGNOSIS — Z87.891 PERSONAL HISTORY OF NICOTINE DEPENDENCE: ICD-10-CM

## 2017-02-06 DIAGNOSIS — E78.5 HYPERLIPIDEMIA, UNSPECIFIED: ICD-10-CM

## 2017-02-06 DIAGNOSIS — E11.65 TYPE 2 DIABETES MELLITUS WITH HYPERGLYCEMIA: ICD-10-CM

## 2017-02-06 DIAGNOSIS — Z88.0 ALLERGY STATUS TO PENICILLIN: ICD-10-CM

## 2017-02-06 LAB
ANION GAP SERPL CALC-SCNC: 11 MMOL/L — SIGNIFICANT CHANGE UP (ref 5–17)
ANION GAP SERPL CALC-SCNC: 11 MMOL/L — SIGNIFICANT CHANGE UP (ref 5–17)
APTT BLD: 34.4 SEC — SIGNIFICANT CHANGE UP (ref 27.5–37.4)
BLD GP AB SCN SERPL QL: SIGNIFICANT CHANGE UP
BUN SERPL-MCNC: 45 MG/DL — HIGH (ref 7–23)
BUN SERPL-MCNC: 51 MG/DL — HIGH (ref 7–23)
CALCIUM SERPL-MCNC: 8 MG/DL — LOW (ref 8.5–10.1)
CALCIUM SERPL-MCNC: 9.2 MG/DL — SIGNIFICANT CHANGE UP (ref 8.5–10.1)
CHLORIDE SERPL-SCNC: 107 MMOL/L — SIGNIFICANT CHANGE UP (ref 96–108)
CHLORIDE SERPL-SCNC: 112 MMOL/L — HIGH (ref 96–108)
CO2 SERPL-SCNC: 20 MMOL/L — LOW (ref 22–31)
CO2 SERPL-SCNC: 24 MMOL/L — SIGNIFICANT CHANGE UP (ref 22–31)
CREAT SERPL-MCNC: 1.64 MG/DL — HIGH (ref 0.5–1.3)
CREAT SERPL-MCNC: 1.94 MG/DL — HIGH (ref 0.5–1.3)
GLUCOSE SERPL-MCNC: 148 MG/DL — HIGH (ref 70–99)
GLUCOSE SERPL-MCNC: 148 MG/DL — HIGH (ref 70–99)
HCT VFR BLD CALC: 36.3 % — LOW (ref 39–50)
HGB BLD-MCNC: 11.9 G/DL — LOW (ref 13–17)
INR BLD: 1.22 RATIO — HIGH (ref 0.88–1.16)
INR BLD: 1.31 RATIO — HIGH (ref 0.88–1.16)
MCHC RBC-ENTMCNC: 29.3 PG — SIGNIFICANT CHANGE UP (ref 27–34)
MCHC RBC-ENTMCNC: 32.8 GM/DL — SIGNIFICANT CHANGE UP (ref 32–36)
MCV RBC AUTO: 89.4 FL — SIGNIFICANT CHANGE UP (ref 80–100)
PLATELET # BLD AUTO: 146 K/UL — LOW (ref 150–400)
POTASSIUM SERPL-MCNC: 4.3 MMOL/L — SIGNIFICANT CHANGE UP (ref 3.5–5.3)
POTASSIUM SERPL-MCNC: 4.4 MMOL/L — SIGNIFICANT CHANGE UP (ref 3.5–5.3)
POTASSIUM SERPL-SCNC: 4.3 MMOL/L — SIGNIFICANT CHANGE UP (ref 3.5–5.3)
POTASSIUM SERPL-SCNC: 4.4 MMOL/L — SIGNIFICANT CHANGE UP (ref 3.5–5.3)
PROTHROM AB SERPL-ACNC: 13.5 SEC — HIGH (ref 10–13.1)
PROTHROM AB SERPL-ACNC: 14.4 SEC — HIGH (ref 10–13.1)
RBC # BLD: 4.06 M/UL — LOW (ref 4.2–5.8)
RBC # FLD: 14.8 % — HIGH (ref 10.3–14.5)
SODIUM SERPL-SCNC: 142 MMOL/L — SIGNIFICANT CHANGE UP (ref 135–145)
SODIUM SERPL-SCNC: 143 MMOL/L — SIGNIFICANT CHANGE UP (ref 135–145)
TYPE + AB SCN PNL BLD: SIGNIFICANT CHANGE UP
WBC # BLD: 11.4 K/UL — HIGH (ref 3.8–10.5)
WBC # FLD AUTO: 11.4 K/UL — HIGH (ref 3.8–10.5)

## 2017-02-06 PROCEDURE — 88304 TISSUE EXAM BY PATHOLOGIST: CPT | Mod: 26

## 2017-02-06 PROCEDURE — 93010 ELECTROCARDIOGRAM REPORT: CPT

## 2017-02-06 PROCEDURE — 88311 DECALCIFY TISSUE: CPT | Mod: 26

## 2017-02-06 RX ORDER — GLUCAGON INJECTION, SOLUTION 0.5 MG/.1ML
1 INJECTION, SOLUTION SUBCUTANEOUS ONCE
Qty: 0 | Refills: 0 | Status: DISCONTINUED | OUTPATIENT
Start: 2017-02-06 | End: 2017-02-11

## 2017-02-06 RX ORDER — LISINOPRIL 2.5 MG/1
5 TABLET ORAL DAILY
Qty: 0 | Refills: 0 | Status: DISCONTINUED | OUTPATIENT
Start: 2017-02-06 | End: 2017-02-11

## 2017-02-06 RX ORDER — ASPIRIN/CALCIUM CARB/MAGNESIUM 324 MG
81 TABLET ORAL DAILY
Qty: 0 | Refills: 0 | Status: DISCONTINUED | OUTPATIENT
Start: 2017-02-06 | End: 2017-02-11

## 2017-02-06 RX ORDER — CHOLECALCIFEROL (VITAMIN D3) 125 MCG
2000 CAPSULE ORAL DAILY
Qty: 0 | Refills: 0 | Status: DISCONTINUED | OUTPATIENT
Start: 2017-02-06 | End: 2017-02-11

## 2017-02-06 RX ORDER — CARVEDILOL PHOSPHATE 80 MG/1
25 CAPSULE, EXTENDED RELEASE ORAL EVERY 12 HOURS
Qty: 0 | Refills: 0 | Status: DISCONTINUED | OUTPATIENT
Start: 2017-02-06 | End: 2017-02-11

## 2017-02-06 RX ORDER — MEXILETINE HYDROCHLORIDE 150 MG/1
150 CAPSULE ORAL
Qty: 0 | Refills: 0 | Status: DISCONTINUED | OUTPATIENT
Start: 2017-02-06 | End: 2017-02-11

## 2017-02-06 RX ORDER — DIGOXIN 250 MCG
0.12 TABLET ORAL DAILY
Qty: 0 | Refills: 0 | Status: DISCONTINUED | OUTPATIENT
Start: 2017-02-06 | End: 2017-02-11

## 2017-02-06 RX ORDER — SODIUM CHLORIDE 9 MG/ML
1000 INJECTION INTRAMUSCULAR; INTRAVENOUS; SUBCUTANEOUS
Qty: 0 | Refills: 0 | Status: DISCONTINUED | OUTPATIENT
Start: 2017-02-06 | End: 2017-02-07

## 2017-02-06 RX ORDER — DEXTROSE 50 % IN WATER 50 %
12.5 SYRINGE (ML) INTRAVENOUS ONCE
Qty: 0 | Refills: 0 | Status: DISCONTINUED | OUTPATIENT
Start: 2017-02-06 | End: 2017-02-11

## 2017-02-06 RX ORDER — ATORVASTATIN CALCIUM 80 MG/1
10 TABLET, FILM COATED ORAL AT BEDTIME
Qty: 0 | Refills: 0 | Status: DISCONTINUED | OUTPATIENT
Start: 2017-02-06 | End: 2017-02-11

## 2017-02-06 RX ORDER — ONDANSETRON 8 MG/1
4 TABLET, FILM COATED ORAL ONCE
Qty: 0 | Refills: 0 | Status: DISCONTINUED | OUTPATIENT
Start: 2017-02-06 | End: 2017-02-11

## 2017-02-06 RX ORDER — FUROSEMIDE 40 MG
40 TABLET ORAL DAILY
Qty: 0 | Refills: 0 | Status: DISCONTINUED | OUTPATIENT
Start: 2017-02-06 | End: 2017-02-11

## 2017-02-06 RX ORDER — DEXTROSE 50 % IN WATER 50 %
1 SYRINGE (ML) INTRAVENOUS ONCE
Qty: 0 | Refills: 0 | Status: DISCONTINUED | OUTPATIENT
Start: 2017-02-06 | End: 2017-02-11

## 2017-02-06 RX ORDER — DEXTROSE 50 % IN WATER 50 %
25 SYRINGE (ML) INTRAVENOUS ONCE
Qty: 0 | Refills: 0 | Status: DISCONTINUED | OUTPATIENT
Start: 2017-02-06 | End: 2017-02-11

## 2017-02-06 RX ORDER — FENTANYL CITRATE 50 UG/ML
25 INJECTION INTRAVENOUS
Qty: 0 | Refills: 0 | Status: DISCONTINUED | OUTPATIENT
Start: 2017-02-06 | End: 2017-02-11

## 2017-02-06 RX ORDER — MORPHINE SULFATE 50 MG/1
5 CAPSULE, EXTENDED RELEASE ORAL
Qty: 0 | Refills: 0 | Status: DISCONTINUED | OUTPATIENT
Start: 2017-02-06 | End: 2017-02-11

## 2017-02-06 RX ORDER — OXYCODONE HYDROCHLORIDE 5 MG/1
5 TABLET ORAL ONCE
Qty: 0 | Refills: 0 | Status: DISCONTINUED | OUTPATIENT
Start: 2017-02-06 | End: 2017-02-11

## 2017-02-06 RX ORDER — SODIUM CHLORIDE 9 MG/ML
1000 INJECTION, SOLUTION INTRAVENOUS
Qty: 0 | Refills: 0 | Status: DISCONTINUED | OUTPATIENT
Start: 2017-02-06 | End: 2017-02-11

## 2017-02-06 RX ORDER — INSULIN LISPRO 100/ML
VIAL (ML) SUBCUTANEOUS
Qty: 0 | Refills: 0 | Status: DISCONTINUED | OUTPATIENT
Start: 2017-02-06 | End: 2017-02-11

## 2017-02-06 RX ORDER — INSULIN LISPRO 100/ML
VIAL (ML) SUBCUTANEOUS AT BEDTIME
Qty: 0 | Refills: 0 | Status: DISCONTINUED | OUTPATIENT
Start: 2017-02-06 | End: 2017-02-11

## 2017-02-06 NOTE — CONSULT NOTE ADULT - SUBJECTIVE AND OBJECTIVE BOX
Patient is a 76y old  Male who presents with a chief complaint of right femoral endarectomy (06 Feb 2017 09:46)      HPI:PVD with progressive claudication      PAST MEDICAL & SURGICAL HISTORY:  CAD (coronary artery disease)  Diabetes: controlled by diet  Glaucoma  Afib  Hyperlipidemia  PVD (peripheral vascular disease) with claudication  CKD (chronic kidney disease)  Hypertension  Coronary artery disease  Diabetes mellitus  Heart failure  H/O vasectomy  S/P CABG x 2  AICD (automatic cardioverter/defibrillator) present: 2005 and replaced 2015      PREVIOUS DIAGNOSTIC TESTING:      ECHO FINDINGS: severe mitral regurgitation, EF 40%    STRESS  FINDINGS:    CATHETERIZATION  FINDINGS:    MEDICATIONS  (STANDING):  carvedilol 25milliGRAM(s) Oral every 12 hours  digoxin     Tablet 0.125milliGRAM(s) Oral daily  aspirin enteric coated 81milliGRAM(s) Oral daily  mexiletine 150milliGRAM(s) Oral two times a day  lisinopril 5milliGRAM(s) Oral daily    MEDICATIONS  (PRN):      FAMILY HISTORY:      SOCIAL HISTORY:  ***    REVIEW OF SYSTEM:  Pertinent items are noted in HPI.  Constitutional negative for chills, fevers, sweats and weight loss  Eyes:  negative for color blindness and visual disturbance  Ears, nose, mouth,   throat, and face:  negative for epistaxis, nasal congestion, sore throat and   tinnitus  Neck: negative for enlargement, pain and difficulty in swallowing  Respiratory: negative for cough, dyspnea on exertion, pleuritic chest pain  and wheezing  Cardiovascular:  severe MR, V Tach, s/P ICD  Gastrointestinal: negative for abdominal pain, diarrhea, nausea and vomiting  Genitourinary: negative for dysuria, frequency and urinary incontinence  Skin:  negative for redness, rash, pruritus, swelling, dryness and   fissures  Hematologic/lymphatic: negative for bleeding and easy bruising  Musculoskeletal: negative for arthralgias, back pain and muscle weakness  Neurological: negative for dizziness, headaches, seizures and tremors  Behavioral/Psych:  negative for mood change, depression, suicidal attempts  Endocrine: negative for blurry vision, polydipsia and polyuria, DM  diaphoresis,   Allergic/Immunologic: negative for anaphylaxis, angioedema and urticaria    Vital Signs Last 24 Hrs  T(C): 36.3, Max: 36.3 (02-06 @ 08:41)  T(F): 97.3, Max: 97.3 (02-06 @ 08:41)  HR: 69 (69 - 69)  BP: 129/63 (129/63 - 129/63)  BP(mean): --  RR: 16 (16 - 16)  SpO2: 100% (100% - 100%)    I&O's Summary    PHYSICAL EXAM  General Appearance: cooperative, no acute distress,   HEENT: PERRL, conjunctiva clear, EOM's intact, non injected pharynx, no exudate, TM   normal  Neck: Supple, , no adenopathy, thyroid: not enlarged, no carotid bruit or JVD  Back: Symmetric, no  tenderness,no soft tissue tenderness  Lungs: Clear to auscultation bilaterally,no adventitious breath sounds, normal   expiratory phase  Heart: Regular rate and rhythm, S1, S2 normal,  2/6 holosystolic  murmur apexAbdomen: Soft, non-tender, bowel sounds active , no hepatosplenomegaly  Extremities: no cyanosis or edema, no joint swelling Bandage right inguinal area No PT/DP bilat  Skin: Skin color, texture normal, no rashes   Neurologic: Alert and oriented X3 , cranial nerves intact, sensory and motor normal,        INTERPRETATION OF TELEMETRY:    ECG:atrial pacing, PVCs NSST-T changes        LABS:                          11.9   11.4  )-----------( 146      ( 06 Feb 2017 15:10 )             36.3     06 Feb 2017 15:10    143    |  112    |  45     ----------------------------<  148    4.4     |  20     |  1.64     Ca    8.0        06 Feb 2017 15:10              PT/INR - ( 06 Feb 2017 16:30 )   PT: 14.4 sec;   INR: 1.31 ratio         PTT - ( 06 Feb 2017 16:30 )  PTT:34.4 sec          RADIOLOGY & ADDITIONAL STUDIES:    IMPRESSION: !. CAD- S/P CABG- stable  2.PVC- s/p  femoral endarterectomy.  3. Vent tach- ICD  4. Mitral regurgiation  5. DM- Type 2  6.  History of atrial flutter  PLAN:  continue carvedilol, mexiletine,  digoxin,ASA. Hold warfarin at present.  Thank you, we will  follow.

## 2017-02-06 NOTE — ASU PATIENT PROFILE, ADULT - PSH
AICD (automatic cardioverter/defibrillator) present  2005 and replaced 2015  H/O vasectomy    S/P CABG x 2

## 2017-02-06 NOTE — ASU PATIENT PROFILE, ADULT - PMH
Afib    CAD (coronary artery disease)    CKD (chronic kidney disease)    Coronary artery disease    Diabetes  controlled by diet  Diabetes mellitus    Glaucoma    Heart failure    Hyperlipidemia    Hypertension    PVD (peripheral vascular disease) with claudication

## 2017-02-07 DIAGNOSIS — I50.22 CHRONIC SYSTOLIC (CONGESTIVE) HEART FAILURE: ICD-10-CM

## 2017-02-07 DIAGNOSIS — I48.2 CHRONIC ATRIAL FIBRILLATION: ICD-10-CM

## 2017-02-07 DIAGNOSIS — I73.9 PERIPHERAL VASCULAR DISEASE, UNSPECIFIED: ICD-10-CM

## 2017-02-07 DIAGNOSIS — I10 ESSENTIAL (PRIMARY) HYPERTENSION: ICD-10-CM

## 2017-02-07 DIAGNOSIS — I25.810 ATHEROSCLEROSIS OF CORONARY ARTERY BYPASS GRAFT(S) WITHOUT ANGINA PECTORIS: ICD-10-CM

## 2017-02-07 DIAGNOSIS — L03.031 CELLULITIS OF RIGHT TOE: ICD-10-CM

## 2017-02-07 DIAGNOSIS — E11.59 TYPE 2 DIABETES MELLITUS WITH OTHER CIRCULATORY COMPLICATIONS: ICD-10-CM

## 2017-02-07 DIAGNOSIS — I05.9 RHEUMATIC MITRAL VALVE DISEASE, UNSPECIFIED: ICD-10-CM

## 2017-02-07 DIAGNOSIS — Z86.79 PERSONAL HISTORY OF OTHER DISEASES OF THE CIRCULATORY SYSTEM: ICD-10-CM

## 2017-02-07 DIAGNOSIS — H40.9 UNSPECIFIED GLAUCOMA: ICD-10-CM

## 2017-02-07 LAB
ANION GAP SERPL CALC-SCNC: 10 MMOL/L — SIGNIFICANT CHANGE UP (ref 5–17)
APTT BLD: 33 SEC — SIGNIFICANT CHANGE UP (ref 27.5–37.4)
BUN SERPL-MCNC: 37 MG/DL — HIGH (ref 7–23)
CALCIUM SERPL-MCNC: 8.2 MG/DL — LOW (ref 8.5–10.1)
CHLORIDE SERPL-SCNC: 110 MMOL/L — HIGH (ref 96–108)
CO2 SERPL-SCNC: 23 MMOL/L — SIGNIFICANT CHANGE UP (ref 22–31)
CREAT SERPL-MCNC: 1.71 MG/DL — HIGH (ref 0.5–1.3)
GLUCOSE SERPL-MCNC: 77 MG/DL — SIGNIFICANT CHANGE UP (ref 70–99)
HCT VFR BLD CALC: 35.6 % — LOW (ref 39–50)
HGB BLD-MCNC: 11.6 G/DL — LOW (ref 13–17)
INR BLD: 1.26 RATIO — HIGH (ref 0.88–1.16)
MCHC RBC-ENTMCNC: 29.8 PG — SIGNIFICANT CHANGE UP (ref 27–34)
MCHC RBC-ENTMCNC: 32.7 GM/DL — SIGNIFICANT CHANGE UP (ref 32–36)
MCV RBC AUTO: 91.3 FL — SIGNIFICANT CHANGE UP (ref 80–100)
PLATELET # BLD AUTO: 145 K/UL — LOW (ref 150–400)
POTASSIUM SERPL-MCNC: 4.2 MMOL/L — SIGNIFICANT CHANGE UP (ref 3.5–5.3)
POTASSIUM SERPL-SCNC: 4.2 MMOL/L — SIGNIFICANT CHANGE UP (ref 3.5–5.3)
PROTHROM AB SERPL-ACNC: 13.9 SEC — HIGH (ref 10–13.1)
RBC # BLD: 3.9 M/UL — LOW (ref 4.2–5.8)
RBC # FLD: 15.3 % — HIGH (ref 10.3–14.5)
SODIUM SERPL-SCNC: 143 MMOL/L — SIGNIFICANT CHANGE UP (ref 135–145)
WBC # BLD: 9.2 K/UL — SIGNIFICANT CHANGE UP (ref 3.8–10.5)
WBC # FLD AUTO: 9.2 K/UL — SIGNIFICANT CHANGE UP (ref 3.8–10.5)

## 2017-02-07 RX ORDER — CEFAZOLIN SODIUM 1 G
2000 VIAL (EA) INJECTION ONCE
Qty: 0 | Refills: 0 | Status: COMPLETED | OUTPATIENT
Start: 2017-02-07 | End: 2017-02-07

## 2017-02-07 RX ORDER — SODIUM CHLORIDE 9 MG/ML
1000 INJECTION INTRAMUSCULAR; INTRAVENOUS; SUBCUTANEOUS
Qty: 0 | Refills: 0 | Status: DISCONTINUED | OUTPATIENT
Start: 2017-02-06 | End: 2017-02-07

## 2017-02-07 RX ADMIN — ATORVASTATIN CALCIUM 10 MILLIGRAM(S): 80 TABLET, FILM COATED ORAL at 22:15

## 2017-02-07 RX ADMIN — Medication 0.12 MILLIGRAM(S): at 05:51

## 2017-02-07 RX ADMIN — SODIUM CHLORIDE 75 MILLILITER(S): 9 INJECTION INTRAMUSCULAR; INTRAVENOUS; SUBCUTANEOUS at 00:33

## 2017-02-07 RX ADMIN — Medication 2000 UNIT(S): at 13:58

## 2017-02-07 RX ADMIN — Medication 81 MILLIGRAM(S): at 13:15

## 2017-02-07 RX ADMIN — CARVEDILOL PHOSPHATE 25 MILLIGRAM(S): 80 CAPSULE, EXTENDED RELEASE ORAL at 18:07

## 2017-02-07 RX ADMIN — MEXILETINE HYDROCHLORIDE 150 MILLIGRAM(S): 150 CAPSULE ORAL at 18:07

## 2017-02-07 RX ADMIN — CARVEDILOL PHOSPHATE 25 MILLIGRAM(S): 80 CAPSULE, EXTENDED RELEASE ORAL at 05:51

## 2017-02-07 RX ADMIN — LISINOPRIL 5 MILLIGRAM(S): 2.5 TABLET ORAL at 12:12

## 2017-02-07 RX ADMIN — MEXILETINE HYDROCHLORIDE 150 MILLIGRAM(S): 150 CAPSULE ORAL at 05:51

## 2017-02-07 RX ADMIN — Medication 100 MILLIGRAM(S): at 01:24

## 2017-02-07 NOTE — CONSULT NOTE ADULT - PROBLEM SELECTOR RECOMMENDATION 8
stable  Last EF 40%  c/w LAsix, coreg  Hold lasix for now ( Pt is taking lasix at home PRN)  DAily weights

## 2017-02-07 NOTE — PROGRESS NOTE ADULT - SUBJECTIVE AND OBJECTIVE BOX
Patient is a 76y old  Male who presents with a chief complaint of right femoral endarectomy (06 Feb 2017 09:46)      HPI:      PAST MEDICAL & SURGICAL HISTORY:  CAD (coronary artery disease)  Diabetes: controlled by diet  Glaucoma  Afib  Hyperlipidemia  PVD (peripheral vascular disease) with claudication  CKD (chronic kidney disease)  Hypertension  Coronary artery disease  Diabetes mellitus  Heart failure  H/O vasectomy  S/P CABG x 2  AICD (automatic cardioverter/defibrillator) present: 2005 and replaced 2015      PREVIOUS DIAGNOSTIC TESTING:      ECHO  FINDINGS:    STRESS  FINDINGS:    CATHETERIZATION  FINDINGS:    MEDICATIONS  (STANDING):  carvedilol 25milliGRAM(s) Oral every 12 hours  digoxin     Tablet 0.125milliGRAM(s) Oral daily  aspirin enteric coated 81milliGRAM(s) Oral daily  mexiletine 150milliGRAM(s) Oral two times a day  lisinopril 5milliGRAM(s) Oral daily  fentaNYL    Injectable 25MICROGram(s) IV Push every 10 minutes  furosemide    Tablet 40milliGRAM(s) Oral daily  cholecalciferol 2000Unit(s) Oral daily  atorvastatin 10milliGRAM(s) Oral at bedtime  insulin lispro (HumaLOG) corrective regimen sliding scale  SubCutaneous three times a day before meals  insulin lispro (HumaLOG) corrective regimen sliding scale  SubCutaneous at bedtime  dextrose 5%. 1000milliLiter(s) IV Continuous <Continuous>  dextrose 50% Injectable 12.5Gram(s) IV Push once  dextrose 50% Injectable 25Gram(s) IV Push once  dextrose 50% Injectable 25Gram(s) IV Push once  sodium chloride 0.9%. 1000milliLiter(s) IV Continuous <Continuous>  sodium chloride 0.9%. 1000milliLiter(s) IV Continuous <Continuous>    MEDICATIONS  (PRN):  oxyCODONE IR 5milliGRAM(s) Oral once PRN Moderate Pain (4 - 6)  ondansetron Injectable 4milliGRAM(s) IV Push once PRN Nausea  morphine  - Injectable 5milliGRAM(s) IV Push every 3 hours PRN Severe Pain (7 - 10)  oxyCODONE  5 mG/acetaminophen 325 mG 1Tablet(s) Oral every 4 hours PRN Moderate Pain (4 - 6)  dextrose Gel 1Dose(s) Oral once PRN Blood Glucose LESS THAN 70 milliGRAM(s)/deciliter  glucagon  Injectable 1milliGRAM(s) IntraMuscular once PRN Glucose LESS THAN 70 milligrams/deciliter      FAMILY HISTORY:      SOCIAL HISTORY:  ***    REVIEW OF SYSTEM:  Pertinent items are noted in HPI.  Constitutional negative for chills, fevers, sweats and weight loss  Eyes:  negative for color blindness and visual disturbance  Ears, nose, mouth,   throat, and face:  negative for epistaxis, nasal congestion, sore throat and   tinnitus  Neck: negative for enlargement, pain and difficulty in swallowing  Respiratory: negative for cough, dyspnea on exertion, pleuritic chest pain  and wheezing  Cardiovascular:  negative for chest pain, dyspnea and palpitations  Gastrointestinal: negative for abdominal pain, diarrhea, nausea and vomiting  Genitourinary: negative for dysuria, frequency and urinary incontinence  Skin:  negative for redness, rash, pruritus, swelling, dryness and   fissures  Hematologic/lymphatic: negative for bleeding and easy bruising  Musculoskeletal: negative for arthralgias, back pain and muscle weakness  Neurological: negative for dizziness, headaches, seizures and tremors  Behavioral/Psych:  negative for mood change, depression, suicidal attempts  Endocrine: negative for blurry vision, polydipsia and polyuria,   diaphoresis,   Allergic/Immunologic: negative for anaphylaxis, angioedema and urticaria    Vital Signs Last 24 Hrs  T(C): 36.5, Max: 36.5 (02-07 @ 05:50)  T(F): 97.7, Max: 97.7 (02-07 @ 05:50)  HR: 85 (69 - 85)  BP: 102/81 (102/81 - 134/60)  BP(mean): 86 (64 - 86)  RR: 22 (16 - 23)  SpO2: 97% (97% - 100%)    I&O's Summary    I & Os for current day (as of 07 Feb 2017 07:11)  =============================================  IN: 815 ml / OUT: 525 ml / NET: 290 ml  PHYSICAL EXAM  General Appearance: cooperative, no acute distress,   HEENT: PERRL, conjunctiva clear, EOM's intact, non injected pharynx, no exudate, TM   normal  Neck: Supple, , no adenopathy, thyroid: not enlarged, no carotid bruit or JVD  Back: Symmetric, no  tenderness,no soft tissue tenderness  Lungs: Clear to auscultation bilaterally,no adventitious breath sounds, normal   expiratory phase  Heart: Regular rate and rhythm, S1, S2 normal,  2/6 holosystolic  murmur apexAbdomen: Soft, non-tender, bowel sounds active , no hepatosplenomegaly  Extremities: no cyanosis or edema, no joint swelling Bandage right inguinal area No PT/DP bilat  Skin: Skin color, texture normal, no rashes   Neurologic: Alert and oriented X3 , cranial nerves intact, sensory and motor normal,      INTERPRETATION OF TELEMETRY:    ECG: atrial pacing with periods of SVT        LABS:                          11.6   9.2   )-----------( 145      ( 07 Feb 2017 04:36 )             35.6     07 Feb 2017 04:36    143    |  110    |  37     ----------------------------<  77     4.2     |  23     |  1.71     Ca    8.2        07 Feb 2017 04:36              PT/INR - ( 07 Feb 2017 04:36 )   PT: 13.9 sec;   INR: 1.26 ratio         PTT - ( 07 Feb 2017 04:36 )  PTT:33.0 sec          RADIOLOGY & ADDITIONAL STUDIES:    IMPRESSION:1. PVD- s/p right femoral enarterectomy  2. MR with mild LV dysfunction  3. Hx VT/ICD  4. Parox atrial fib/flutter  5. DM        PLAN:cont  mexiletine, carvedilol, digoxin, ASA, atorvastatin.  To resume warfarin when OK with Dr. Royal

## 2017-02-07 NOTE — CONSULT NOTE ADULT - PROBLEM SELECTOR RECOMMENDATION 10
resolved  D/w DR. Corona, bone scan was neg for OM, healing well, no need on further Abxs  C/w Local wound care as per Podiatry

## 2017-02-07 NOTE — CONSULT NOTE ADULT - PROBLEM SELECTOR PROBLEM 1
Coronary artery disease involving coronary bypass graft without angina pectoris, unspecified whether native or transplanted heart

## 2017-02-07 NOTE — CONSULT NOTE ADULT - SUBJECTIVE AND OBJECTIVE BOX
76 y.o. male with PMH VT s/p AICD, AFib on AC, CAD s/p CABG, DM2, gout, glaucoma,  HLD, chronic systolic  CHF, PVD, CKD 2-3 admitted for planned Sx, S/p  R femoral endarterectomy POD #1, Hospitalist consult called for mdical management 76 y.o. male with PMH VT s/p AICD, AFib on AC, CAD s/p CABG, DM2, gout, glaucoma,  HLD, chronic systolic  CHF, PVD, CKD 2-3 admitted for planned Sx, S/p  R femoral endarterectomy POD #1, Hospitalist consult called for medical management . Pt was seen and examined at stepdown unit, feels well, still tenderness at site of procedure, better with pain meds. Tolerates PO, Walsh removed few hours ago, as per RN had adequate UO. Pt denies CP or SOB. Pt was admitted to  recently for R great toe cellulitis and was on PO abxs.       PMH: as above  PSH:  CABG, AICD placement, vasectomy, R femoral endarterectomy   Allergy:   NKDA  SH:  ex smoker, quit about 30 years ago, denies drinking alcohol       Vital Signs Last 24 Hrs  T(C): 36.4, Max: 36.5 (02-07 @ 05:50)  T(F): 97.6, Max: 97.7 (02-07 @ 05:50)  HR: 85 (71 - 85)  BP: 132/62 (102/81 - 134/60)  BP(mean): 78 (64 - 86)  RR: 25 (19 - 25)  SpO2: 100% (97% - 100%)

## 2017-02-07 NOTE — CONSULT NOTE ADULT - ASSESSMENT
76 y.o. male with PMH VT s/p AICD, AFib on AC, CAD s/p CABG, DM2, gout, glaucoma,  HLD, chronic systolic  CHF, PVD, CKD 2-3 admitted for planned Sx, S/p  R femoral endarterectomy POD #1

## 2017-02-08 DIAGNOSIS — I47.2 VENTRICULAR TACHYCARDIA: ICD-10-CM

## 2017-02-08 LAB
INR BLD: 1.2 RATIO — HIGH (ref 0.88–1.16)
PROTHROM AB SERPL-ACNC: 13.2 SEC — HIGH (ref 10–13.1)
SURGICAL PATHOLOGY FINAL REPORT - CH: SIGNIFICANT CHANGE UP

## 2017-02-08 PROCEDURE — 93010 ELECTROCARDIOGRAM REPORT: CPT

## 2017-02-08 RX ORDER — LATANOPROST 0.05 MG/ML
1 SOLUTION/ DROPS OPHTHALMIC; TOPICAL AT BEDTIME
Qty: 0 | Refills: 0 | Status: DISCONTINUED | OUTPATIENT
Start: 2017-02-08 | End: 2017-02-11

## 2017-02-08 RX ORDER — DORZOLAMIDE HYDROCHLORIDE TIMOLOL MALEATE 20; 5 MG/ML; MG/ML
1 SOLUTION/ DROPS OPHTHALMIC
Qty: 0 | Refills: 0 | Status: DISCONTINUED | OUTPATIENT
Start: 2017-02-08 | End: 2017-02-11

## 2017-02-08 RX ORDER — WARFARIN SODIUM 2.5 MG/1
5 TABLET ORAL DAILY
Qty: 0 | Refills: 0 | Status: COMPLETED | OUTPATIENT
Start: 2017-02-08 | End: 2017-02-10

## 2017-02-08 RX ADMIN — LATANOPROST 1 DROP(S): 0.05 SOLUTION/ DROPS OPHTHALMIC; TOPICAL at 00:55

## 2017-02-08 RX ADMIN — WARFARIN SODIUM 5 MILLIGRAM(S): 2.5 TABLET ORAL at 22:04

## 2017-02-08 RX ADMIN — DORZOLAMIDE HYDROCHLORIDE TIMOLOL MALEATE 1 DROP(S): 20; 5 SOLUTION/ DROPS OPHTHALMIC at 00:55

## 2017-02-08 RX ADMIN — MEXILETINE HYDROCHLORIDE 150 MILLIGRAM(S): 150 CAPSULE ORAL at 18:17

## 2017-02-08 RX ADMIN — Medication 40 MILLIGRAM(S): at 06:00

## 2017-02-08 RX ADMIN — Medication 0.12 MILLIGRAM(S): at 06:00

## 2017-02-08 RX ADMIN — LISINOPRIL 5 MILLIGRAM(S): 2.5 TABLET ORAL at 13:47

## 2017-02-08 RX ADMIN — Medication 2000 UNIT(S): at 13:45

## 2017-02-08 RX ADMIN — ATORVASTATIN CALCIUM 10 MILLIGRAM(S): 80 TABLET, FILM COATED ORAL at 22:04

## 2017-02-08 RX ADMIN — Medication 81 MILLIGRAM(S): at 13:46

## 2017-02-08 RX ADMIN — MEXILETINE HYDROCHLORIDE 150 MILLIGRAM(S): 150 CAPSULE ORAL at 06:00

## 2017-02-08 RX ADMIN — DORZOLAMIDE HYDROCHLORIDE TIMOLOL MALEATE 1 DROP(S): 20; 5 SOLUTION/ DROPS OPHTHALMIC at 22:03

## 2017-02-08 RX ADMIN — CARVEDILOL PHOSPHATE 25 MILLIGRAM(S): 80 CAPSULE, EXTENDED RELEASE ORAL at 06:00

## 2017-02-08 RX ADMIN — LATANOPROST 1 DROP(S): 0.05 SOLUTION/ DROPS OPHTHALMIC; TOPICAL at 22:03

## 2017-02-08 RX ADMIN — CARVEDILOL PHOSPHATE 25 MILLIGRAM(S): 80 CAPSULE, EXTENDED RELEASE ORAL at 18:17

## 2017-02-08 NOTE — PROGRESS NOTE ADULT - PROBLEM SELECTOR PROBLEM 2
Type 2 diabetes mellitus with other circulatory complication
Type 2 diabetes mellitus with other circulatory complication

## 2017-02-08 NOTE — PROGRESS NOTE ADULT - PROBLEM SELECTOR PROBLEM 1
Coronary artery disease involving coronary bypass graft without angina pectoris, unspecified whether native or transplanted heart
Coronary artery disease involving coronary bypass graft without angina pectoris, unspecified whether native or transplanted heart

## 2017-02-08 NOTE — PROGRESS NOTE ADULT - SUBJECTIVE AND OBJECTIVE BOX
76 y.o. male with PMH VT s/p AICD, AFib on AC, CAD s/p CABG, DM2, gout, glaucoma,  HLD, chronic systolic  CHF, PVD, CKD 2-3 admitted for planned Sx, S/p  R femoral endarterectomy POD #2. Feeling well, pain better, OOB to chair, urinates, good oral intake     Vital Signs Last 24 Hrs  T(C): 36.4, Max: 37.1 (02-08 @ 18:34)  T(F): 97.6, Max: 98.8 (02-08 @ 18:34)  HR: 72 (70 - 78)  BP: 124/91 (105/42 - 144/57)  BP(mean): 98 (49 - 98)  RR: 27 (14 - 27)  SpO2: 94% (92% - 99%)      MEDICATIONS  (STANDING):  carvedilol 25milliGRAM(s) Oral every 12 hours  digoxin     Tablet 0.125milliGRAM(s) Oral daily  aspirin enteric coated 81milliGRAM(s) Oral daily  mexiletine 150milliGRAM(s) Oral two times a day  lisinopril 5milliGRAM(s) Oral daily  fentaNYL    Injectable 25MICROGram(s) IV Push every 10 minutes  furosemide    Tablet 40milliGRAM(s) Oral daily  cholecalciferol 2000Unit(s) Oral daily  atorvastatin 10milliGRAM(s) Oral at bedtime  insulin lispro (HumaLOG) corrective regimen sliding scale  SubCutaneous three times a day before meals  insulin lispro (HumaLOG) corrective regimen sliding scale  SubCutaneous at bedtime  dextrose 5%. 1000milliLiter(s) IV Continuous <Continuous>  dextrose 50% Injectable 12.5Gram(s) IV Push once  dextrose 50% Injectable 25Gram(s) IV Push once  dextrose 50% Injectable 25Gram(s) IV Push once  dorzolamide 2%/timolol 0.5% Ophthalmic Solution 1Drop(s) Both EYES two times a day  latanoprost 0.005% Ophthalmic Solution 1Drop(s) Both EYES at bedtime  warfarin 5milliGRAM(s) Oral daily    MEDICATIONS  (PRN):  oxyCODONE IR 5milliGRAM(s) Oral once PRN Moderate Pain (4 - 6)  ondansetron Injectable 4milliGRAM(s) IV Push once PRN Nausea  morphine  - Injectable 5milliGRAM(s) IV Push every 3 hours PRN Severe Pain (7 - 10)  oxyCODONE  5 mG/acetaminophen 325 mG 1Tablet(s) Oral every 4 hours PRN Moderate Pain (4 - 6)  dextrose Gel 1Dose(s) Oral once PRN Blood Glucose LESS THAN 70 milliGRAM(s)/deciliter  glucagon  Injectable 1milliGRAM(s) IntraMuscular once PRN Glucose LESS THAN 70 milligrams/deciliter

## 2017-02-08 NOTE — PROGRESS NOTE ADULT - SUBJECTIVE AND OBJECTIVE BOX
Patient is a 76y old  Male who presents with a chief complaint of right femoral endarectomy (06 Feb 2017 09:46)      HPI:s/p periph bypass. oob to chair feels well no chest pain or dyspnea      PAST MEDICAL & SURGICAL HISTORY:  CAD (coronary artery disease)  Diabetes: controlled by diet  Glaucoma  Afib  Hyperlipidemia  PVD (peripheral vascular disease) with claudication  CKD (chronic kidney disease)  Hypertension  Coronary artery disease  Diabetes mellitus  Heart failure  H/O vasectomy  S/P CABG x 2  AICD (automatic cardioverter/defibrillator) present: 2005 and replaced 2015        MEDICATIONS  (STANDING):  carvedilol 25milliGRAM(s) Oral every 12 hours  digoxin     Tablet 0.125milliGRAM(s) Oral daily  aspirin enteric coated 81milliGRAM(s) Oral daily  mexiletine 150milliGRAM(s) Oral two times a day  lisinopril 5milliGRAM(s) Oral daily  fentaNYL    Injectable 25MICROGram(s) IV Push every 10 minutes  furosemide    Tablet 40milliGRAM(s) Oral daily  cholecalciferol 2000Unit(s) Oral daily  atorvastatin 10milliGRAM(s) Oral at bedtime  insulin lispro (HumaLOG) corrective regimen sliding scale  SubCutaneous three times a day before meals  insulin lispro (HumaLOG) corrective regimen sliding scale  SubCutaneous at bedtime  dextrose 5%. 1000milliLiter(s) IV Continuous <Continuous>  dextrose 50% Injectable 12.5Gram(s) IV Push once  dextrose 50% Injectable 25Gram(s) IV Push once  dextrose 50% Injectable 25Gram(s) IV Push once  dorzolamide 2%/timolol 0.5% Ophthalmic Solution 1Drop(s) Both EYES two times a day  latanoprost 0.005% Ophthalmic Solution 1Drop(s) Both EYES at bedtime  warfarin 5milliGRAM(s) Oral daily    MEDICATIONS  (PRN):  oxyCODONE IR 5milliGRAM(s) Oral once PRN Moderate Pain (4 - 6)  ondansetron Injectable 4milliGRAM(s) IV Push once PRN Nausea  morphine  - Injectable 5milliGRAM(s) IV Push every 3 hours PRN Severe Pain (7 - 10)  oxyCODONE  5 mG/acetaminophen 325 mG 1Tablet(s) Oral every 4 hours PRN Moderate Pain (4 - 6)  dextrose Gel 1Dose(s) Oral once PRN Blood Glucose LESS THAN 70 milliGRAM(s)/deciliter  glucagon  Injectable 1milliGRAM(s) IntraMuscular once PRN Glucose LESS THAN 70 milligrams/deciliter          Vital Signs Last 24 Hrs  T(C): 36.2, Max: 36.7 (02-07 @ 17:01)  T(F): 97.2, Max: 98.1 (02-07 @ 17:01)  HR: 72 (70 - 82)  BP: 130/53 (118/52 - 148/62)  BP(mean): 72 (66 - 98)  RR: 18 (14 - 25)  SpO2: 92% (92% - 100%)    I&O's Summary  I & Os for 24h ending 08 Feb 2017 07:00  =============================================  IN: 800 ml / OUT: 775 ml / NET: 25 ml    I & Os for current day (as of 08 Feb 2017 08:43)  =============================================  IN: 0 ml / OUT: 50 ml / NET: -50 ml      PHYSICAL EXAM  General Appearance:NAD  HEENT: NCAT  Neck: no jvd  Back:   Lungs: clear bilat  Heart: RR s1s2 syst m llsb  Abdomen:   Extremities: no edema  Skin:   Neurologic: alert and oriented      INTERPRETATION OF TELEMETRY:AV pacing    ECG:        LABS:                          11.6   9.2   )-----------( 145      ( 07 Feb 2017 04:36 )             35.6     07 Feb 2017 04:36    143    |  110    |  37     ----------------------------<  77     4.2     |  23     |  1.71     Ca    8.2        07 Feb 2017 04:36              PT/INR - ( 07 Feb 2017 04:36 )   PT: 13.9 sec;   INR: 1.26 ratio         PTT - ( 07 Feb 2017 04:36 )  PTT:33.0 sec          RADIOLOGY & ADDITIONAL STUDIES:

## 2017-02-09 ENCOUNTER — TRANSCRIPTION ENCOUNTER (OUTPATIENT)
Age: 77
End: 2017-02-09

## 2017-02-09 LAB
INR BLD: 1.13 RATIO — SIGNIFICANT CHANGE UP (ref 0.88–1.16)
PROTHROM AB SERPL-ACNC: 12.5 SEC — SIGNIFICANT CHANGE UP (ref 10–13.1)

## 2017-02-09 RX ORDER — DOCUSATE SODIUM 100 MG
100 CAPSULE ORAL THREE TIMES A DAY
Qty: 0 | Refills: 0 | Status: DISCONTINUED | OUTPATIENT
Start: 2017-02-09 | End: 2017-02-11

## 2017-02-09 RX ORDER — SENNA PLUS 8.6 MG/1
2 TABLET ORAL AT BEDTIME
Qty: 0 | Refills: 0 | Status: DISCONTINUED | OUTPATIENT
Start: 2017-02-09 | End: 2017-02-11

## 2017-02-09 RX ADMIN — ATORVASTATIN CALCIUM 10 MILLIGRAM(S): 80 TABLET, FILM COATED ORAL at 22:04

## 2017-02-09 RX ADMIN — CARVEDILOL PHOSPHATE 25 MILLIGRAM(S): 80 CAPSULE, EXTENDED RELEASE ORAL at 05:15

## 2017-02-09 RX ADMIN — Medication 81 MILLIGRAM(S): at 13:53

## 2017-02-09 RX ADMIN — Medication 2000 UNIT(S): at 12:17

## 2017-02-09 RX ADMIN — MEXILETINE HYDROCHLORIDE 150 MILLIGRAM(S): 150 CAPSULE ORAL at 23:05

## 2017-02-09 RX ADMIN — DORZOLAMIDE HYDROCHLORIDE TIMOLOL MALEATE 1 DROP(S): 20; 5 SOLUTION/ DROPS OPHTHALMIC at 22:05

## 2017-02-09 RX ADMIN — LISINOPRIL 5 MILLIGRAM(S): 2.5 TABLET ORAL at 05:15

## 2017-02-09 RX ADMIN — Medication 100 MILLIGRAM(S): at 23:04

## 2017-02-09 RX ADMIN — WARFARIN SODIUM 5 MILLIGRAM(S): 2.5 TABLET ORAL at 22:04

## 2017-02-09 RX ADMIN — Medication 0.12 MILLIGRAM(S): at 05:15

## 2017-02-09 RX ADMIN — LATANOPROST 1 DROP(S): 0.05 SOLUTION/ DROPS OPHTHALMIC; TOPICAL at 22:08

## 2017-02-09 RX ADMIN — Medication 40 MILLIGRAM(S): at 05:15

## 2017-02-09 RX ADMIN — MEXILETINE HYDROCHLORIDE 150 MILLIGRAM(S): 150 CAPSULE ORAL at 05:15

## 2017-02-09 RX ADMIN — DORZOLAMIDE HYDROCHLORIDE TIMOLOL MALEATE 1 DROP(S): 20; 5 SOLUTION/ DROPS OPHTHALMIC at 05:19

## 2017-02-09 RX ADMIN — SENNA PLUS 2 TABLET(S): 8.6 TABLET ORAL at 23:04

## 2017-02-09 NOTE — DISCHARGE NOTE ADULT - CARE PLAN
Principal Discharge DX:	PVD (peripheral vascular disease) with claudication  Goal:	improved ability to walk  Instructions for follow-up, activity and diet:	activity as tolerated  low salt diet

## 2017-02-09 NOTE — PROGRESS NOTE ADULT - SUBJECTIVE AND OBJECTIVE BOX
CC: right femoral endarectomy     HPI: 76 y.o. male with PMH VT s/p AICD, AFib on AC, CAD s/p CABG, DM2, gout, glaucoma,  HLD, chronic systolic  CHF, PVD, CKD 2-3 admitted for planned Sx, S/p  R femoral endarterectomy POD #3.     INTERVAL HPI/OVERNIGHT EVENTS: Feeling well,  OOB to chair, ambulated, urinates, good oral intake     Vital Signs Last 24 Hrs  T(C): 36, Max: 36.4 (02-08 @ 21:16)  T(F): 96.8, Max: 97.6 (02-08 @ 21:16)  HR: 73 (70 - 76)  BP: 94/78 (94/78 - 143/117)  BP(mean): 82 (63 - 124)  RR: 19 (12 - 22)  SpO2: 96% (94% - 99%)    PT/INR - ( 09 Feb 2017 04:38 )   PT: 12.5 sec;   INR: 1.13 ratio           CAPILLARY BLOOD GLUCOSE  96 (09 Feb 2017 17:07)  116 (09 Feb 2017 12:00)  93 (09 Feb 2017 08:41)  105 (08 Feb 2017 22:01)          MEDICATIONS  (STANDING):  carvedilol 25milliGRAM(s) Oral every 12 hours  digoxin     Tablet 0.125milliGRAM(s) Oral daily  aspirin enteric coated 81milliGRAM(s) Oral daily  mexiletine 150milliGRAM(s) Oral two times a day  lisinopril 5milliGRAM(s) Oral daily  fentaNYL    Injectable 25MICROGram(s) IV Push every 10 minutes  furosemide    Tablet 40milliGRAM(s) Oral daily  cholecalciferol 2000Unit(s) Oral daily  atorvastatin 10milliGRAM(s) Oral at bedtime  insulin lispro (HumaLOG) corrective regimen sliding scale  SubCutaneous three times a day before meals  insulin lispro (HumaLOG) corrective regimen sliding scale  SubCutaneous at bedtime  dextrose 5%. 1000milliLiter(s) IV Continuous <Continuous>  dextrose 50% Injectable 12.5Gram(s) IV Push once  dextrose 50% Injectable 25Gram(s) IV Push once  dextrose 50% Injectable 25Gram(s) IV Push once  dorzolamide 2%/timolol 0.5% Ophthalmic Solution 1Drop(s) Both EYES two times a day  latanoprost 0.005% Ophthalmic Solution 1Drop(s) Both EYES at bedtime  warfarin 5milliGRAM(s) Oral daily    MEDICATIONS  (PRN):  oxyCODONE IR 5milliGRAM(s) Oral once PRN Moderate Pain (4 - 6)  ondansetron Injectable 4milliGRAM(s) IV Push once PRN Nausea  morphine  - Injectable 5milliGRAM(s) IV Push every 3 hours PRN Severe Pain (7 - 10)  oxyCODONE  5 mG/acetaminophen 325 mG 1Tablet(s) Oral every 4 hours PRN Moderate Pain (4 - 6)  dextrose Gel 1Dose(s) Oral once PRN Blood Glucose LESS THAN 70 milliGRAM(s)/deciliter  glucagon  Injectable 1milliGRAM(s) IntraMuscular once PRN Glucose LESS THAN 70 milligrams/deciliter      RADIOLOGY & ADDITIONAL TESTS:

## 2017-02-09 NOTE — PHYSICAL THERAPY INITIAL EVALUATION ADULT - GENERAL OBSERVATIONS, REHAB EVAL
Received patient in bed in SDU, performing magic tricks for the staff. R great toe wrapped in gauze.

## 2017-02-09 NOTE — PROGRESS NOTE ADULT - SUBJECTIVE AND OBJECTIVE BOX
feeling that he is improving and moving with less difficulty    complains of paresthesias of medial thigh    believes R foot pain improved    R groin dressing stained but no signficant hematoma

## 2017-02-09 NOTE — DISCHARGE NOTE ADULT - REASON FOR ADMISSION
right femoral endarectomy right foot pain secondary to peripheral vascular disease ( to undergo R femoral endarterectomy)

## 2017-02-09 NOTE — DISCHARGE NOTE ADULT - MEDICATION SUMMARY - MEDICATIONS TO TAKE
I will START or STAY ON the medications listed below when I get home from the hospital:    Aspir 81 81 mg oral tablet  -- 1 tab(s) by mouth once a day (at bedtime)  -- Indication: For CAD (coronary artery disease)    quinapril 5 mg oral tablet  -- 1 tab(s) by mouth once a day  -- Indication: For Essential hypertension    digoxin 125 mcg (0.125 mg) oral tablet  -- 1 tab(s) by mouth once a day (at bedtime)  -- Indication: For Chronic atrial fibrillation    mexiletine 150 mg oral capsule  -- 1 cap(s) by mouth every 12 hours  -- Indication: For Chronic atrial fibrillation    warfarin 2.5 mg oral tablet  -- 1 tab(s) by mouth once a day  -- Indication: For Chronic atrial fibrillation    allopurinol 100 mg oral tablet  -- 1 tab(s) by mouth once a day (at bedtime)  -- Indication: For Gout    doxycycline monohydrate 100 mg oral capsule  -- 1 cap(s) by mouth every 12 hours  -- Indication: For Cellulitis of great toe of right foot    Coreg 25 mg oral tablet  -- 1 tab(s) by mouth 2 times a day  -- Indication: For Essential hypertension    furosemide 40 mg oral tablet  --  by mouth , As Needed  -- Indication: For Chronic systolic congestive heart failure    Flax Seed Oil oral capsule  -- 1 cap(s) by mouth once a day  -- Indication: For PERIPHERAL ARTERY DISEASE    potassium chloride 10 mEq oral capsule, extended release  --  by mouth , As Needed  -- Indication: For Chronic systolic congestive heart failure    dorzolamide-timolol 2%-0.5% preservative-free ophthalmic solution  -- 1 drop(s) to each affected eye 2 times a day  -- Indication: For Glaucoma of both eyes, unspecified glaucoma    latanoprost 0.005% ophthalmic solution  -- 1 drop(s) to each affected eye once a day (at bedtime)  -- Indication: For Glaucoma of both eyes, unspecified glaucoma    pantoprazole 40 mg oral delayed release tablet  -- 1 tab(s) by mouth once a day  -- Indication: For GERD    Vitamin D3 2000 intl units oral capsule  -- 1 cap(s) by mouth once a day  -- Indication: For osteoporosis

## 2017-02-09 NOTE — DISCHARGE NOTE ADULT - MEDICATION SUMMARY - MEDICATIONS TO STOP TAKING
I will STOP taking the medications listed below when I get home from the hospital:    enoxaparin 80 mg/0.8 mL injectable solution  -- 0.7 milliliter(s) injectable once a day  -- It is very important that you take or use this exactly as directed.  Do not skip doses or discontinue unless directed by your doctor.

## 2017-02-09 NOTE — DISCHARGE NOTE ADULT - CARE PROVIDER_API CALL
Wero Olivo (MD), Cardiovascular Disease; Critical Care Medicine; Internal Medicine  [address] [place] [address]  Phone: [phone]  Fax: [phone]    Daniel Royal), Vascular Surgery  [address] [place] [address]  Phone: [phone]  Fax: [phone]

## 2017-02-09 NOTE — PROGRESS NOTE ADULT - SUBJECTIVE AND OBJECTIVE BOX
Patient is a 76y old  Male who presents with a chief complaint of right femoral endarectomy (06 Feb 2017 09:46)      HPI:  2/9 out of bed ,No SOB, chest pain    PAST MEDICAL & SURGICAL HISTORY:  CAD (coronary artery disease)  Diabetes: controlled by diet  Glaucoma  Afib  Hyperlipidemia  PVD (peripheral vascular disease) with claudication  CKD (chronic kidney disease)  Hypertension  Coronary artery disease  Diabetes mellitus  Heart failure  H/O vasectomy  S/P CABG x 2  AICD (automatic cardioverter/defibrillator) present: 2005 and replaced 2015        MEDICATIONS  (STANDING):  carvedilol 25milliGRAM(s) Oral every 12 hours  digoxin     Tablet 0.125milliGRAM(s) Oral daily  aspirin enteric coated 81milliGRAM(s) Oral daily  mexiletine 150milliGRAM(s) Oral two times a day  lisinopril 5milliGRAM(s) Oral daily  fentaNYL    Injectable 25MICROGram(s) IV Push every 10 minutes  furosemide    Tablet 40milliGRAM(s) Oral daily  cholecalciferol 2000Unit(s) Oral daily  atorvastatin 10milliGRAM(s) Oral at bedtime  insulin lispro (HumaLOG) corrective regimen sliding scale  SubCutaneous three times a day before meals  insulin lispro (HumaLOG) corrective regimen sliding scale  SubCutaneous at bedtime  dextrose 5%. 1000milliLiter(s) IV Continuous <Continuous>  dextrose 50% Injectable 12.5Gram(s) IV Push once  dextrose 50% Injectable 25Gram(s) IV Push once  dextrose 50% Injectable 25Gram(s) IV Push once  dorzolamide 2%/timolol 0.5% Ophthalmic Solution 1Drop(s) Both EYES two times a day  latanoprost 0.005% Ophthalmic Solution 1Drop(s) Both EYES at bedtime  warfarin 5milliGRAM(s) Oral daily    MEDICATIONS  (PRN):  oxyCODONE IR 5milliGRAM(s) Oral once PRN Moderate Pain (4 - 6)  ondansetron Injectable 4milliGRAM(s) IV Push once PRN Nausea  morphine  - Injectable 5milliGRAM(s) IV Push every 3 hours PRN Severe Pain (7 - 10)  oxyCODONE  5 mG/acetaminophen 325 mG 1Tablet(s) Oral every 4 hours PRN Moderate Pain (4 - 6)  dextrose Gel 1Dose(s) Oral once PRN Blood Glucose LESS THAN 70 milliGRAM(s)/deciliter  glucagon  Injectable 1milliGRAM(s) IntraMuscular once PRN Glucose LESS THAN 70 milligrams/deciliter          Vital Signs Last 24 Hrs  T(C): 36.2, Max: 37.1 (02-08 @ 18:34)  T(F): 97.1, Max: 98.8 (02-08 @ 18:34)  HR: 71 (70 - 78)  BP: 121/48 (105/42 - 143/117)  BP(mean): 66 (49 - 124)  RR: 16 (15 - 27)  SpO2: 94% (94% - 99%)    I&O's Summary    I & Os for current day (as of 09 Feb 2017 07:37)  =============================================  IN: 0 ml / OUT: 2675 ml / NET: -2675 ml      PHYSICAL EXAM  General Appearance:comfortable  HEENT:   Neck:   Back:   Lungs: clear  Heart: 2/6 Syst murmur LLSB and apex  Abdomen: soft  Extremities: trace edema, right foot warm  Skin:   Neurologic:       INTERPRETATION OF TELEMETRY:    ECG:tel A-V pacing with occas PVC        LABS:             11.6   9.2   )-----------( 145      ( 07 Feb 2017 04:36 )             35.6     07 Feb 2017 04:36    143    |  110    |  37     ----------------------------<  77     4.2     |  23     |  1.71     Ca    8.2        07 Feb 2017 04:36                    PT/INR - ( 09 Feb 2017 04:38 )   PT: 12.5 sec;   INR: 1.13 ratio                   RADIOLOGY & ADDITIONAL STUDIES:  MPRESSION:1. PVD- s/p right femoral enarterectomy/periph bypass  2. MR with mild LV dysfunction  3. Hx VT/ICD  4. Parox atrial fib/flutter  5. DM    PLAN:cont  mexiletine, carvedilol, digoxin, ASA, atorvastatin, warfarin. Advance activity per Dr. Royal

## 2017-02-09 NOTE — DISCHARGE NOTE ADULT - PATIENT PORTAL LINK FT
“You can access the [employer] Patient Portal, offered by [facility], by registering with the following website: [email or web address]”

## 2017-02-09 NOTE — PHYSICAL THERAPY INITIAL EVALUATION ADULT - MODALITIES TREATMENT COMMENTS
Patient left out of bed in chair with call bed in reach, all ICU monitors intact. Flowtrons replaced.

## 2017-02-09 NOTE — DISCHARGE NOTE ADULT - HOSPITAL COURSE
patient admitted for R femoral endarterectomy and underwent a distal R external iliac to profunda femoral bypass with 7 mm PTFE; post operative course unremarkable with patient not experiencing MI, ambulating without difficulty and no incisional hematoma

## 2017-02-09 NOTE — PHYSICAL THERAPY INITIAL EVALUATION ADULT - ADDITIONAL COMMENTS
Patient reported decreased balance.  Reported seeing an out patient physical therapist.  Patient c/o pain  in R great toe with ambulation, given a cast boot so as to not have to put on a sock over bandage.

## 2017-02-09 NOTE — DISCHARGE NOTE ADULT - ADDITIONAL INSTRUCTIONS
may shower and get incision wet; dry dressing on R 1st toe, do not put tape on skin  follow up with Dr. Royal within 2 weeks, call for appt

## 2017-02-10 LAB
INR BLD: 1.18 RATIO — HIGH (ref 0.88–1.16)
PROTHROM AB SERPL-ACNC: 13 SEC — SIGNIFICANT CHANGE UP (ref 10–13.1)

## 2017-02-10 RX ADMIN — Medication 100 MILLIGRAM(S): at 05:10

## 2017-02-10 RX ADMIN — WARFARIN SODIUM 5 MILLIGRAM(S): 2.5 TABLET ORAL at 21:15

## 2017-02-10 RX ADMIN — DORZOLAMIDE HYDROCHLORIDE TIMOLOL MALEATE 1 DROP(S): 20; 5 SOLUTION/ DROPS OPHTHALMIC at 18:28

## 2017-02-10 RX ADMIN — ATORVASTATIN CALCIUM 10 MILLIGRAM(S): 80 TABLET, FILM COATED ORAL at 21:15

## 2017-02-10 RX ADMIN — Medication 81 MILLIGRAM(S): at 14:28

## 2017-02-10 RX ADMIN — Medication 40 MILLIGRAM(S): at 05:11

## 2017-02-10 RX ADMIN — LISINOPRIL 5 MILLIGRAM(S): 2.5 TABLET ORAL at 05:11

## 2017-02-10 RX ADMIN — DORZOLAMIDE HYDROCHLORIDE TIMOLOL MALEATE 1 DROP(S): 20; 5 SOLUTION/ DROPS OPHTHALMIC at 05:11

## 2017-02-10 RX ADMIN — Medication 100 MILLIGRAM(S): at 14:29

## 2017-02-10 RX ADMIN — CARVEDILOL PHOSPHATE 25 MILLIGRAM(S): 80 CAPSULE, EXTENDED RELEASE ORAL at 18:29

## 2017-02-10 RX ADMIN — SENNA PLUS 2 TABLET(S): 8.6 TABLET ORAL at 21:15

## 2017-02-10 RX ADMIN — Medication 100 MILLIGRAM(S): at 21:15

## 2017-02-10 RX ADMIN — Medication 0.12 MILLIGRAM(S): at 05:10

## 2017-02-10 RX ADMIN — MEXILETINE HYDROCHLORIDE 150 MILLIGRAM(S): 150 CAPSULE ORAL at 18:29

## 2017-02-10 RX ADMIN — CARVEDILOL PHOSPHATE 25 MILLIGRAM(S): 80 CAPSULE, EXTENDED RELEASE ORAL at 05:11

## 2017-02-10 RX ADMIN — MEXILETINE HYDROCHLORIDE 150 MILLIGRAM(S): 150 CAPSULE ORAL at 05:12

## 2017-02-10 RX ADMIN — LATANOPROST 1 DROP(S): 0.05 SOLUTION/ DROPS OPHTHALMIC; TOPICAL at 21:17

## 2017-02-10 RX ADMIN — Medication 2000 UNIT(S): at 12:04

## 2017-02-10 NOTE — PROGRESS NOTE ADULT - SUBJECTIVE AND OBJECTIVE BOX
afebrile, vitals stable    continues to improve with regard to pain and ability to mobilize    Right femoral incision healing well without infection or hematoma    R foot warm and adequately perfused appearing

## 2017-02-10 NOTE — PROGRESS NOTE ADULT - SUBJECTIVE AND OBJECTIVE BOX
Patient is a 76y old  Male who presents with a chief complaint of right femoral endarectomy (09 Feb 2017 14:33)      HPI:    2/10- ambulated with PT on 2/09  PAST MEDICAL & SURGICAL HISTORY:  CAD (coronary artery disease)  Diabetes: controlled by diet  Glaucoma  Afib  Hyperlipidemia  PVD (peripheral vascular disease) with claudication  CKD (chronic kidney disease)  Hypertension  Coronary artery disease  Diabetes mellitus  Heart failure  H/O vasectomy  S/P CABG x 2  AICD (automatic cardioverter/defibrillator) present: 2005 and replaced 2015        MEDICATIONS  (STANDING):  carvedilol 25milliGRAM(s) Oral every 12 hours  digoxin     Tablet 0.125milliGRAM(s) Oral daily  aspirin enteric coated 81milliGRAM(s) Oral daily  mexiletine 150milliGRAM(s) Oral two times a day  lisinopril 5milliGRAM(s) Oral daily  fentaNYL    Injectable 25MICROGram(s) IV Push every 10 minutes  furosemide    Tablet 40milliGRAM(s) Oral daily  cholecalciferol 2000Unit(s) Oral daily  atorvastatin 10milliGRAM(s) Oral at bedtime  insulin lispro (HumaLOG) corrective regimen sliding scale  SubCutaneous three times a day before meals  insulin lispro (HumaLOG) corrective regimen sliding scale  SubCutaneous at bedtime  dextrose 5%. 1000milliLiter(s) IV Continuous <Continuous>  dextrose 50% Injectable 12.5Gram(s) IV Push once  dextrose 50% Injectable 25Gram(s) IV Push once  dextrose 50% Injectable 25Gram(s) IV Push once  dorzolamide 2%/timolol 0.5% Ophthalmic Solution 1Drop(s) Both EYES two times a day  latanoprost 0.005% Ophthalmic Solution 1Drop(s) Both EYES at bedtime  warfarin 5milliGRAM(s) Oral daily  senna 2Tablet(s) Oral at bedtime  docusate sodium 100milliGRAM(s) Oral three times a day    MEDICATIONS  (PRN):  oxyCODONE IR 5milliGRAM(s) Oral once PRN Moderate Pain (4 - 6)  ondansetron Injectable 4milliGRAM(s) IV Push once PRN Nausea  morphine  - Injectable 5milliGRAM(s) IV Push every 3 hours PRN Severe Pain (7 - 10)  oxyCODONE  5 mG/acetaminophen 325 mG 1Tablet(s) Oral every 4 hours PRN Moderate Pain (4 - 6)  dextrose Gel 1Dose(s) Oral once PRN Blood Glucose LESS THAN 70 milliGRAM(s)/deciliter  glucagon  Injectable 1milliGRAM(s) IntraMuscular once PRN Glucose LESS THAN 70 milligrams/deciliter          Vital Signs Last 24 Hrs  T(C): 36.1, Max: 36.4 (02-09 @ 21:16)  T(F): 97, Max: 97.6 (02-09 @ 21:16)  HR: 72 (70 - 76)  BP: 144/59 (91/68 - 144/59)  BP(mean): 76 (63 - 121)  RR: 18 (12 - 22)  SpO2: 99% (95% - 99%)    I&O's Summary    I & Os for current day (as of 10 Feb 2017 07:32)  =============================================  IN: 0 ml / OUT: 2075 ml / NET: -2075 ml      PHYSICAL EXAM  General Appearance:comfortable  HEENT:   Neck:   Back:   Lungs: clear  Heart: 2/6 syst murmur LLSB and apex  Abdomen:   Extremities: trace edema, right foot warm  Skin:   Neurologic:       INTERPRETATION OF TELEMETRY:    ECG:        LABS:               11.6   9.2   )-----------( 145      ( 07 Feb 2017 04:36 )             35.6     07 Feb 2017 04:36    143    |  110    |  37     ----------------------------<  77     4.2     |  23     |  1.71     Ca    8.2        07 Feb 2017 04:36                          PT/INR - ( 09 Feb 2017 04:38 )   PT: 12.5 sec;   INR: 1.13 ratio                   RADIOLOGY & ADDITIONAL STUDIES:    IMPRESSION:1. PVD- s/p right femoral enarterectomy/periph bypass  2. MR with mild LV dysfunction  3. Hx VT/ICD  4. Parox atrial fib/flutter  5. DM    PLAN:cont  mexiletine, carvedilol, digoxin, ASA, atorvastatin, warfarin. Advance activity per Dr. Royal    Electronic Signatures:    PLAN:

## 2017-02-10 NOTE — PROGRESS NOTE ADULT - RESPIRATORY
Breath Sounds equal & clear to percussion & auscultation, no accessory muscle use

## 2017-02-10 NOTE — PROGRESS NOTE ADULT - EYES
EOMI; PERRL; no drainage or redness

## 2017-02-10 NOTE — PROGRESS NOTE ADULT - SUBJECTIVE AND OBJECTIVE BOX
CC: right femoral endarectomy     HPI: 76 y.o. male with PMH VT s/p AICD, AFib on AC, CAD s/p CABG, DM2, gout, glaucoma,  HLD, chronic systolic  CHF, PVD, CKD 2-3 admitted for planned Sx, S/p  R femoral endarterectomy POD #3.     INTERVAL HPI/OVERNIGHT EVENTS: Feeling well,  OOB to chair, ambulated, urinates, good oral intake     Vital Signs Last 24 Hrs  T(C): 36.2, Max: 36.4 (02-09 @ 21:16)  T(F): 97.1, Max: 97.6 (02-09 @ 21:16)  HR: 73 (70 - 73)  BP: 106/81 (87/51 - 144/59)  BP(mean): 87 (60 - 112)  RR: 18 (13 - 19)  SpO2: 98% (95% - 99%)  PT/INR - ( 09 Feb 2017 04:38 )   PT: 12.5 sec;   INR: 1.13 ratio    PT/INR - ( 10 Feb 2017 05:50 )   PT: 13.0 sec;   INR: 1.18 ratio         CAPILLARY BLOOD GLUCOSE  96 (09 Feb 2017 17:07)  116 (09 Feb 2017 12:00)  93 (09 Feb 2017 08:41)  105 (08 Feb 2017 22:01)    MEDICATIONS  (STANDING):  carvedilol 25milliGRAM(s) Oral every 12 hours  digoxin     Tablet 0.125milliGRAM(s) Oral daily  aspirin enteric coated 81milliGRAM(s) Oral daily  mexiletine 150milliGRAM(s) Oral two times a day  lisinopril 5milliGRAM(s) Oral daily  fentaNYL    Injectable 25MICROGram(s) IV Push every 10 minutes  furosemide    Tablet 40milliGRAM(s) Oral daily  cholecalciferol 2000Unit(s) Oral daily  atorvastatin 10milliGRAM(s) Oral at bedtime  insulin lispro (HumaLOG) corrective regimen sliding scale  SubCutaneous three times a day before meals  insulin lispro (HumaLOG) corrective regimen sliding scale  SubCutaneous at bedtime  dextrose 5%. 1000milliLiter(s) IV Continuous <Continuous>  dextrose 50% Injectable 12.5Gram(s) IV Push once  dextrose 50% Injectable 25Gram(s) IV Push once  dextrose 50% Injectable 25Gram(s) IV Push once  dorzolamide 2%/timolol 0.5% Ophthalmic Solution 1Drop(s) Both EYES two times a day  latanoprost 0.005% Ophthalmic Solution 1Drop(s) Both EYES at bedtime  warfarin 5milliGRAM(s) Oral daily  senna 2Tablet(s) Oral at bedtime  docusate sodium 100milliGRAM(s) Oral three times a day    MEDICATIONS  (PRN):  oxyCODONE IR 5milliGRAM(s) Oral once PRN Moderate Pain (4 - 6)  ondansetron Injectable 4milliGRAM(s) IV Push once PRN Nausea  morphine  - Injectable 5milliGRAM(s) IV Push every 3 hours PRN Severe Pain (7 - 10)  oxyCODONE  5 mG/acetaminophen 325 mG 1Tablet(s) Oral every 4 hours PRN Moderate Pain (4 - 6)  dextrose Gel 1Dose(s) Oral once PRN Blood Glucose LESS THAN 70 milliGRAM(s)/deciliter  glucagon  Injectable 1milliGRAM(s) IntraMuscular once PRN Glucose LESS THAN 70 milligrams/deciliter

## 2017-02-11 VITALS
DIASTOLIC BLOOD PRESSURE: 54 MMHG | TEMPERATURE: 98 F | SYSTOLIC BLOOD PRESSURE: 120 MMHG | OXYGEN SATURATION: 98 % | HEART RATE: 70 BPM | RESPIRATION RATE: 16 BRPM

## 2017-02-11 LAB
INR BLD: 1.27 RATIO — HIGH (ref 0.88–1.16)
PROTHROM AB SERPL-ACNC: 14 SEC — HIGH (ref 10–13.1)

## 2017-02-11 RX ADMIN — Medication 100 MILLIGRAM(S): at 05:52

## 2017-02-11 RX ADMIN — MEXILETINE HYDROCHLORIDE 150 MILLIGRAM(S): 150 CAPSULE ORAL at 05:51

## 2017-02-11 RX ADMIN — CARVEDILOL PHOSPHATE 25 MILLIGRAM(S): 80 CAPSULE, EXTENDED RELEASE ORAL at 05:51

## 2017-02-11 RX ADMIN — Medication 0.12 MILLIGRAM(S): at 06:34

## 2017-02-11 RX ADMIN — Medication 2000 UNIT(S): at 11:38

## 2017-02-11 RX ADMIN — LISINOPRIL 5 MILLIGRAM(S): 2.5 TABLET ORAL at 05:51

## 2017-02-11 RX ADMIN — DORZOLAMIDE HYDROCHLORIDE TIMOLOL MALEATE 1 DROP(S): 20; 5 SOLUTION/ DROPS OPHTHALMIC at 05:50

## 2017-02-11 NOTE — PROGRESS NOTE ADULT - ASSESSMENT
MPRESSION:1. PVD- s/p right femoral enarterectomy/periph bypass  2. MR with mild LV dysfunction  3. Hx VT/ICD  4. Parox atrial fib/flutter  5. DM        PLAN:cont  mexiletine, carvedilol, digoxin, ASA, atorvastatin, warfarin [post op care per surg
patent R distal external iliac to profunda femoral bypass    continue PT, local care to toe, and anti coagulation
patent bypass
status post R external iliac artery to profunda femoral artery bypass with improved distal perfusion    continue with PT and anti coagulation
status post R external iliac to profunda femoral bypass for ischemic rest pain R foot and ulcer 1st toe
76 y.o. male with PMH VT s/p AICD, AFib on AC, CAD s/p CABG, DM2, gout, glaucoma,  HLD, chronic systolic  CHF, PVD, CKD 2-3 admitted for planned Sx, S/p  R femoral endarterectomy POD #3     1. Coronary artery disease involving coronary bypass graft without angina pectoris, unspecified whether native or transplanted heart.   - No chest pain  c/w BB, ASA and Statins.    2. Type 2 diabetes mellitus with other circulatory complication,  diet controlled.     3.   Chronic atrial fibrillation.   Monitor  INR daily    Restarted  on Coumadin   C/w dig, BB.     4. PVD (peripheral vascular disease) with claudication.   S/p Femoral endarterectomy  management as per SX  Monitor UO  pain management  PT     5. Essential hypertension.     C/w Lisinopril, Coreg.       6. Glaucoma of both eyes, unspecified glaucoma.   C/w ophthalmic drops.      7.  H/O ventricular tachycardia,  S/p AICD  C/w mexiletin.    8.  Chronic systolic congestive heart failure, stable  Last EF 40%  c/w LAsix, PRN,  coreg  DAily weights.      9.  Disorder of mitral valve, has Severe MVR  stable.    10. Recent  Cellulitis of great toe of right foot,  resolved  D/w DR. Corona, bone scan was neg for OM, healing well, no need on further Abxs  C/w Local wound care as per Podiatry, f/u oputPt     D/w DR. Royal, ok for transfer to med-surg floor, will sign out of the case, call for reconsult
76 y.o. male with PMH VT s/p AICD, AFib on AC, CAD s/p CABG, DM2, gout, glaucoma,  HLD, chronic systolic  CHF, PVD, CKD 2-3 admitted for planned Sx, S/p  R femoral endarterectomy POD #2    1. Coronary artery disease involving coronary bypass graft without angina pectoris, unspecified whether native or transplanted heart.   - No chest pain  c/w BB, ASA and Statins.  2. Type 2 diabetes mellitus with other circulatory complication,  diet controlled.     3.   Chronic atrial fibrillation.    Check INR   Restart on Coumadin   C/w dig, BB.     4. PVD (peripheral vascular disease) with claudication.   S/p Femoral endarterectomy  management as per SX  Monitor UO  pain management       5. Essential hypertension.     C/w Lisinopril, Coreg.       6. Glaucoma of both eyes, unspecified glaucoma.   C/w ophthalmic drops.      7.  H/O ventricular tachycardia,  S/p AICD  C/w mexiletin.    8.  Chronic systolic congestive heart failure, stable  Last EF 40%  c/w LAsix, coreg  Hold lasix for now ( Pt is taking lasix at home PRN)  DAily weights.    9.  Disorder of mitral valve, has Severe MVR  stable.    10. Recent  Cellulitis of great toe of right foot,  resolved  D/w DR. Corona, bone scan was neg for OM, healing well, no need on further Abxs  C/w Local wound care as per Podiatry.
76 y.o. male with PMH VT s/p AICD, AFib on AC, CAD s/p CABG, DM2, gout, glaucoma,  HLD, chronic systolic  CHF, PVD, CKD 2-3 admitted for planned Sx, S/p  R femoral endarterectomy POD #3     1. Coronary artery disease involving coronary bypass graft without angina pectoris, unspecified whether native or transplanted heart.   - No chest pain  c/w BB, ASA and Statins.    2. Type 2 diabetes mellitus with other circulatory complication,  diet controlled.     3.   Chronic atrial fibrillation.   Monitor  INR daily    Restarted  on Coumadin   C/w dig, BB.     4. PVD (peripheral vascular disease) with claudication.   S/p Femoral endarterectomy  management as per SX  Monitor UO  pain management  PT     5. Essential hypertension.     C/w Lisinopril, Coreg.       6. Glaucoma of both eyes, unspecified glaucoma.   C/w ophthalmic drops.      7.  H/O ventricular tachycardia,  S/p AICD  C/w mexiletin.    8.  Chronic systolic congestive heart failure, stable  Last EF 40%  c/w LAsix, PRN,  coreg  DAily weights.      9.  Disorder of mitral valve, has Severe MVR  stable.    10. Recent  Cellulitis of great toe of right foot,  resolved  D/w DR. Corona, bone scan was neg for OM, healing well, no need on further Abxs  C/w Local wound care as per Podiatry.

## 2017-02-11 NOTE — PROGRESS NOTE ADULT - SUBJECTIVE AND OBJECTIVE BOX
stable    ambulating    incision intact and without evidence of hematoma or infection    R foot pink and warm

## 2017-02-11 NOTE — PROGRESS NOTE ADULT - SUBJECTIVE AND OBJECTIVE BOX
Patient is a 76y old  Male who presents with a chief complaint of right femoral endarectomy (09 Feb 2017 14:33)      HPI:2/11 no new complaints      PAST MEDICAL & SURGICAL HISTORY:  CAD (coronary artery disease)  Diabetes: controlled by diet  Glaucoma  Afib  Hyperlipidemia  PVD (peripheral vascular disease) with claudication  CKD (chronic kidney disease)  Hypertension  Coronary artery disease  Diabetes mellitus  Heart failure  H/O vasectomy  S/P CABG x 2  AICD (automatic cardioverter/defibrillator) present: 2005 and replaced 2015        MEDICATIONS  (STANDING):  carvedilol 25milliGRAM(s) Oral every 12 hours  digoxin     Tablet 0.125milliGRAM(s) Oral daily  aspirin enteric coated 81milliGRAM(s) Oral daily  mexiletine 150milliGRAM(s) Oral two times a day  lisinopril 5milliGRAM(s) Oral daily  fentaNYL    Injectable 25MICROGram(s) IV Push every 10 minutes  furosemide    Tablet 40milliGRAM(s) Oral daily  cholecalciferol 2000Unit(s) Oral daily  atorvastatin 10milliGRAM(s) Oral at bedtime  insulin lispro (HumaLOG) corrective regimen sliding scale  SubCutaneous three times a day before meals  insulin lispro (HumaLOG) corrective regimen sliding scale  SubCutaneous at bedtime  dextrose 5%. 1000milliLiter(s) IV Continuous <Continuous>  dextrose 50% Injectable 12.5Gram(s) IV Push once  dextrose 50% Injectable 25Gram(s) IV Push once  dextrose 50% Injectable 25Gram(s) IV Push once  dorzolamide 2%/timolol 0.5% Ophthalmic Solution 1Drop(s) Both EYES two times a day  latanoprost 0.005% Ophthalmic Solution 1Drop(s) Both EYES at bedtime  senna 2Tablet(s) Oral at bedtime  docusate sodium 100milliGRAM(s) Oral three times a day    MEDICATIONS  (PRN):  oxyCODONE IR 5milliGRAM(s) Oral once PRN Moderate Pain (4 - 6)  ondansetron Injectable 4milliGRAM(s) IV Push once PRN Nausea  morphine  - Injectable 5milliGRAM(s) IV Push every 3 hours PRN Severe Pain (7 - 10)  oxyCODONE  5 mG/acetaminophen 325 mG 1Tablet(s) Oral every 4 hours PRN Moderate Pain (4 - 6)  dextrose Gel 1Dose(s) Oral once PRN Blood Glucose LESS THAN 70 milliGRAM(s)/deciliter  glucagon  Injectable 1milliGRAM(s) IntraMuscular once PRN Glucose LESS THAN 70 milligrams/deciliter          Vital Signs Last 24 Hrs  T(C): 36.4, Max: 36.6 (02-10 @ 23:45)  T(F): 97.6, Max: 97.9 (02-10 @ 23:45)  HR: 70 (70 - 75)  BP: 123/48 (87/51 - 138/61)  BP(mean): 87 (60 - 109)  RR: 15 (15 - 19)  SpO2: 99% (96% - 99%)    I&O's Summary    I & Os for current day (as of 11 Feb 2017 07:43)  =============================================  IN: 630 ml / OUT: 1350 ml / NET: -720 ml      PHYSICAL EXAM  General Appearance:comfortable  HEENT:   Neck:   Back:   Lungs: clear  Heart: 2/6 systolic murmur LLSB and apex  Abdomen:   Extremities:trace edema right foot warm   Skin:   Neurologic:       INTERPRETATION OF TELEMETRY:    ECG:        LABS:                    PT/INR - ( 10 Feb 2017 05:50 )   PT: 13.0 sec;   INR: 1.18 ratio                   RADIOLOGY & ADDITIONAL STUDIES:    Imp 1. s/p right femoral angioplasty  2. MR  3. CAD-stable  4.VT/ICD  5. DM    plan- agree with discharge today. We will follow in the office

## 2017-02-14 DIAGNOSIS — I70.235 ATHEROSCLEROSIS OF NATIVE ARTERIES OF RIGHT LEG WITH ULCERATION OF OTHER PART OF FOOT: ICD-10-CM

## 2017-02-14 DIAGNOSIS — I70.211 ATHEROSCLEROSIS OF NATIVE ARTERIES OF EXTREMITIES WITH INTERMITTENT CLAUDICATION, RIGHT LEG: ICD-10-CM

## 2017-02-14 DIAGNOSIS — I13.0 HYPERTENSIVE HEART AND CHRONIC KIDNEY DISEASE WITH HEART FAILURE AND STAGE 1 THROUGH STAGE 4 CHRONIC KIDNEY DISEASE, OR UNSPECIFIED CHRONIC KIDNEY DISEASE: ICD-10-CM

## 2017-02-14 DIAGNOSIS — E11.9 TYPE 2 DIABETES MELLITUS WITHOUT COMPLICATIONS: ICD-10-CM

## 2017-02-14 DIAGNOSIS — N18.3 CHRONIC KIDNEY DISEASE, STAGE 3 (MODERATE): ICD-10-CM

## 2017-02-14 DIAGNOSIS — Z95.1 PRESENCE OF AORTOCORONARY BYPASS GRAFT: ICD-10-CM

## 2017-02-14 DIAGNOSIS — M79.671 PAIN IN RIGHT FOOT: ICD-10-CM

## 2017-02-14 DIAGNOSIS — I25.10 ATHEROSCLEROTIC HEART DISEASE OF NATIVE CORONARY ARTERY WITHOUT ANGINA PECTORIS: ICD-10-CM

## 2017-02-14 DIAGNOSIS — L97.519 NON-PRESSURE CHRONIC ULCER OF OTHER PART OF RIGHT FOOT WITH UNSPECIFIED SEVERITY: ICD-10-CM

## 2017-02-14 DIAGNOSIS — I48.0 PAROXYSMAL ATRIAL FIBRILLATION: ICD-10-CM

## 2017-02-14 DIAGNOSIS — H40.9 UNSPECIFIED GLAUCOMA: ICD-10-CM

## 2017-02-14 DIAGNOSIS — I50.22 CHRONIC SYSTOLIC (CONGESTIVE) HEART FAILURE: ICD-10-CM

## 2017-02-14 DIAGNOSIS — Z87.891 PERSONAL HISTORY OF NICOTINE DEPENDENCE: ICD-10-CM

## 2017-02-14 DIAGNOSIS — I48.92 UNSPECIFIED ATRIAL FLUTTER: ICD-10-CM

## 2017-02-14 DIAGNOSIS — I34.0 NONRHEUMATIC MITRAL (VALVE) INSUFFICIENCY: ICD-10-CM

## 2017-02-14 DIAGNOSIS — Z79.01 LONG TERM (CURRENT) USE OF ANTICOAGULANTS: ICD-10-CM

## 2017-02-14 DIAGNOSIS — Z95.810 PRESENCE OF AUTOMATIC (IMPLANTABLE) CARDIAC DEFIBRILLATOR: ICD-10-CM

## 2017-04-20 ENCOUNTER — INPATIENT (INPATIENT)
Facility: HOSPITAL | Age: 77
LOS: 8 days | Discharge: HOME HEALTH | End: 2017-04-29
Attending: FAMILY MEDICINE | Admitting: FAMILY MEDICINE
Payer: MEDICARE

## 2017-04-20 VITALS — WEIGHT: 151.9 LBS | HEIGHT: 69 IN

## 2017-04-20 DIAGNOSIS — I50.9 HEART FAILURE, UNSPECIFIED: ICD-10-CM

## 2017-04-20 DIAGNOSIS — L08.9 LOCAL INFECTION OF THE SKIN AND SUBCUTANEOUS TISSUE, UNSPECIFIED: ICD-10-CM

## 2017-04-20 DIAGNOSIS — I48.91 UNSPECIFIED ATRIAL FIBRILLATION: ICD-10-CM

## 2017-04-20 DIAGNOSIS — N18.9 CHRONIC KIDNEY DISEASE, UNSPECIFIED: ICD-10-CM

## 2017-04-20 DIAGNOSIS — Z95.810 PRESENCE OF AUTOMATIC (IMPLANTABLE) CARDIAC DEFIBRILLATOR: Chronic | ICD-10-CM

## 2017-04-20 LAB
ALBUMIN SERPL ELPH-MCNC: 3.8 G/DL — SIGNIFICANT CHANGE UP (ref 3.3–5)
ALP SERPL-CCNC: 63 U/L — SIGNIFICANT CHANGE UP (ref 40–120)
ALT FLD-CCNC: 35 U/L — SIGNIFICANT CHANGE UP (ref 12–78)
ANION GAP SERPL CALC-SCNC: 9 MMOL/L — SIGNIFICANT CHANGE UP (ref 5–17)
AST SERPL-CCNC: 23 U/L — SIGNIFICANT CHANGE UP (ref 15–37)
BASOPHILS # BLD AUTO: 0.1 K/UL — SIGNIFICANT CHANGE UP (ref 0–0.2)
BASOPHILS NFR BLD AUTO: 0.5 % — SIGNIFICANT CHANGE UP (ref 0–2)
BILIRUB SERPL-MCNC: 0.6 MG/DL — SIGNIFICANT CHANGE UP (ref 0.2–1.2)
BUN SERPL-MCNC: 31 MG/DL — HIGH (ref 7–23)
CALCIUM SERPL-MCNC: 9.3 MG/DL — SIGNIFICANT CHANGE UP (ref 8.5–10.1)
CHLORIDE SERPL-SCNC: 103 MMOL/L — SIGNIFICANT CHANGE UP (ref 96–108)
CO2 SERPL-SCNC: 25 MMOL/L — SIGNIFICANT CHANGE UP (ref 22–31)
CREAT SERPL-MCNC: 1.91 MG/DL — HIGH (ref 0.5–1.3)
EOSINOPHIL # BLD AUTO: 0.3 K/UL — SIGNIFICANT CHANGE UP (ref 0–0.5)
EOSINOPHIL NFR BLD AUTO: 3 % — SIGNIFICANT CHANGE UP (ref 0–6)
ERYTHROCYTE [SEDIMENTATION RATE] IN BLOOD: 6 MM/HR — SIGNIFICANT CHANGE UP (ref 0–20)
GLUCOSE SERPL-MCNC: 226 MG/DL — HIGH (ref 70–99)
HCT VFR BLD CALC: 40 % — SIGNIFICANT CHANGE UP (ref 39–50)
HGB BLD-MCNC: 13.8 G/DL — SIGNIFICANT CHANGE UP (ref 13–17)
LYMPHOCYTES # BLD AUTO: 1.7 K/UL — SIGNIFICANT CHANGE UP (ref 1–3.3)
LYMPHOCYTES # BLD AUTO: 14.3 % — SIGNIFICANT CHANGE UP (ref 13–44)
MCHC RBC-ENTMCNC: 29.9 PG — SIGNIFICANT CHANGE UP (ref 27–34)
MCHC RBC-ENTMCNC: 34.5 GM/DL — SIGNIFICANT CHANGE UP (ref 32–36)
MCV RBC AUTO: 86.7 FL — SIGNIFICANT CHANGE UP (ref 80–100)
MONOCYTES # BLD AUTO: 0.8 K/UL — SIGNIFICANT CHANGE UP (ref 0–0.9)
MONOCYTES NFR BLD AUTO: 6.9 % — SIGNIFICANT CHANGE UP (ref 2–14)
NEUTROPHILS # BLD AUTO: 8.7 K/UL — HIGH (ref 1.8–7.4)
NEUTROPHILS NFR BLD AUTO: 75.3 % — SIGNIFICANT CHANGE UP (ref 43–77)
PLATELET # BLD AUTO: 146 K/UL — LOW (ref 150–400)
POTASSIUM SERPL-MCNC: 4.6 MMOL/L — SIGNIFICANT CHANGE UP (ref 3.5–5.3)
POTASSIUM SERPL-SCNC: 4.6 MMOL/L — SIGNIFICANT CHANGE UP (ref 3.5–5.3)
PROT SERPL-MCNC: 7.1 GM/DL — SIGNIFICANT CHANGE UP (ref 6–8.3)
RBC # BLD: 4.61 M/UL — SIGNIFICANT CHANGE UP (ref 4.2–5.8)
RBC # FLD: 14.5 % — SIGNIFICANT CHANGE UP (ref 10.3–14.5)
SODIUM SERPL-SCNC: 137 MMOL/L — SIGNIFICANT CHANGE UP (ref 135–145)
URATE SERPL-MCNC: 6.8 MG/DL — SIGNIFICANT CHANGE UP (ref 3.4–8.8)
WBC # BLD: 11.6 K/UL — HIGH (ref 3.8–10.5)
WBC # FLD AUTO: 11.6 K/UL — HIGH (ref 3.8–10.5)

## 2017-04-20 PROCEDURE — 73630 X-RAY EXAM OF FOOT: CPT | Mod: 26,LT

## 2017-04-20 PROCEDURE — 99285 EMERGENCY DEPT VISIT HI MDM: CPT

## 2017-04-20 PROCEDURE — 93010 ELECTROCARDIOGRAM REPORT: CPT

## 2017-04-20 PROCEDURE — 71010: CPT | Mod: 26

## 2017-04-20 RX ORDER — ATORVASTATIN CALCIUM 80 MG/1
5 TABLET, FILM COATED ORAL AT BEDTIME
Qty: 0 | Refills: 0 | Status: DISCONTINUED | OUTPATIENT
Start: 2017-04-20 | End: 2017-04-27

## 2017-04-20 RX ORDER — WARFARIN SODIUM 2.5 MG/1
5 TABLET ORAL
Qty: 0 | Refills: 0 | Status: DISCONTINUED | OUTPATIENT
Start: 2017-04-20 | End: 2017-04-22

## 2017-04-20 RX ORDER — DORZOLAMIDE HYDROCHLORIDE TIMOLOL MALEATE 20; 5 MG/ML; MG/ML
1 SOLUTION/ DROPS OPHTHALMIC
Qty: 0 | Refills: 0 | Status: DISCONTINUED | OUTPATIENT
Start: 2017-04-20 | End: 2017-04-27

## 2017-04-20 RX ORDER — LATANOPROST 0.05 MG/ML
1 SOLUTION/ DROPS OPHTHALMIC; TOPICAL AT BEDTIME
Qty: 0 | Refills: 0 | Status: DISCONTINUED | OUTPATIENT
Start: 2017-04-20 | End: 2017-04-27

## 2017-04-20 RX ORDER — PIPERACILLIN AND TAZOBACTAM 4; .5 G/20ML; G/20ML
3.38 INJECTION, POWDER, LYOPHILIZED, FOR SOLUTION INTRAVENOUS EVERY 8 HOURS
Qty: 0 | Refills: 0 | Status: DISCONTINUED | OUTPATIENT
Start: 2017-04-20 | End: 2017-04-27

## 2017-04-20 RX ORDER — PANTOPRAZOLE SODIUM 20 MG/1
40 TABLET, DELAYED RELEASE ORAL
Qty: 0 | Refills: 0 | Status: DISCONTINUED | OUTPATIENT
Start: 2017-04-20 | End: 2017-04-27

## 2017-04-20 RX ORDER — MEXILETINE HYDROCHLORIDE 150 MG/1
150 CAPSULE ORAL EVERY 12 HOURS
Qty: 0 | Refills: 0 | Status: DISCONTINUED | OUTPATIENT
Start: 2017-04-20 | End: 2017-04-27

## 2017-04-20 RX ORDER — VANCOMYCIN HCL 1 G
1000 VIAL (EA) INTRAVENOUS ONCE
Qty: 0 | Refills: 0 | Status: COMPLETED | OUTPATIENT
Start: 2017-04-20 | End: 2017-04-20

## 2017-04-20 RX ORDER — WARFARIN SODIUM 2.5 MG/1
1 TABLET ORAL
Qty: 0 | Refills: 0 | COMMUNITY

## 2017-04-20 RX ORDER — DIGOXIN 250 MCG
0.12 TABLET ORAL AT BEDTIME
Qty: 0 | Refills: 0 | Status: DISCONTINUED | OUTPATIENT
Start: 2017-04-20 | End: 2017-04-27

## 2017-04-20 RX ORDER — FUROSEMIDE 40 MG
0 TABLET ORAL
Qty: 0 | Refills: 0 | COMMUNITY

## 2017-04-20 RX ORDER — CARVEDILOL PHOSPHATE 80 MG/1
25 CAPSULE, EXTENDED RELEASE ORAL EVERY 12 HOURS
Qty: 0 | Refills: 0 | Status: DISCONTINUED | OUTPATIENT
Start: 2017-04-20 | End: 2017-04-27

## 2017-04-20 RX ORDER — ASPIRIN/CALCIUM CARB/MAGNESIUM 324 MG
81 TABLET ORAL AT BEDTIME
Qty: 0 | Refills: 0 | Status: DISCONTINUED | OUTPATIENT
Start: 2017-04-20 | End: 2017-04-27

## 2017-04-20 RX ORDER — LISINOPRIL 2.5 MG/1
5 TABLET ORAL AT BEDTIME
Qty: 0 | Refills: 0 | Status: DISCONTINUED | OUTPATIENT
Start: 2017-04-20 | End: 2017-04-27

## 2017-04-20 RX ORDER — PIPERACILLIN AND TAZOBACTAM 4; .5 G/20ML; G/20ML
3.38 INJECTION, POWDER, LYOPHILIZED, FOR SOLUTION INTRAVENOUS ONCE
Qty: 0 | Refills: 0 | Status: COMPLETED | OUTPATIENT
Start: 2017-04-20 | End: 2017-04-20

## 2017-04-20 RX ORDER — ALLOPURINOL 300 MG
100 TABLET ORAL
Qty: 0 | Refills: 0 | Status: DISCONTINUED | OUTPATIENT
Start: 2017-04-20 | End: 2017-04-27

## 2017-04-20 RX ORDER — WARFARIN SODIUM 2.5 MG/1
2.5 TABLET ORAL
Qty: 0 | Refills: 0 | Status: DISCONTINUED | OUTPATIENT
Start: 2017-04-20 | End: 2017-04-22

## 2017-04-20 RX ADMIN — PIPERACILLIN AND TAZOBACTAM 200 GRAM(S): 4; .5 INJECTION, POWDER, LYOPHILIZED, FOR SOLUTION INTRAVENOUS at 20:45

## 2017-04-20 RX ADMIN — CARVEDILOL PHOSPHATE 25 MILLIGRAM(S): 80 CAPSULE, EXTENDED RELEASE ORAL at 23:33

## 2017-04-20 RX ADMIN — Medication 0.12 MILLIGRAM(S): at 23:33

## 2017-04-20 RX ADMIN — Medication 250 MILLIGRAM(S): at 22:11

## 2017-04-20 RX ADMIN — LATANOPROST 1 DROP(S): 0.05 SOLUTION/ DROPS OPHTHALMIC; TOPICAL at 23:33

## 2017-04-20 RX ADMIN — Medication 81 MILLIGRAM(S): at 23:32

## 2017-04-20 RX ADMIN — ATORVASTATIN CALCIUM 5 MILLIGRAM(S): 80 TABLET, FILM COATED ORAL at 23:33

## 2017-04-20 RX ADMIN — WARFARIN SODIUM 5 MILLIGRAM(S): 2.5 TABLET ORAL at 23:34

## 2017-04-20 RX ADMIN — DORZOLAMIDE HYDROCHLORIDE TIMOLOL MALEATE 1 DROP(S): 20; 5 SOLUTION/ DROPS OPHTHALMIC at 23:33

## 2017-04-20 RX ADMIN — LISINOPRIL 5 MILLIGRAM(S): 2.5 TABLET ORAL at 23:33

## 2017-04-20 RX ADMIN — MEXILETINE HYDROCHLORIDE 150 MILLIGRAM(S): 150 CAPSULE ORAL at 23:33

## 2017-04-20 NOTE — ED STATDOCS - NS ED MD SCRIBE ATTENDING SCRIBE SECTIONS
RESULTS/CONSULTATIONS/SHIFT CHANGE/HISTORY OF PRESENT ILLNESS/REVIEW OF SYSTEMS/PHYSICAL EXAM/PAST MEDICAL/SURGICAL/SOCIAL HISTORY/DISPOSITION/PROGRESS NOTE

## 2017-04-20 NOTE — H&P ADULT - NSHPPHYSICALEXAM_GEN_ALL_CORE
PHYSICAL EXAM:    Daily Height in cm: 175.26 (20 Apr 2017 18:52)    Daily     ICU Vital Signs Last 24 Hrs  T(C): 36.5, Max: 36.8 (04-20 @ 18:54)  T(F): 97.7, Max: 98.3 (04-20 @ 18:54)  HR: 73 (73 - 88)  BP: 150/74 (150/74 - 165/80)  BP(mean): 100 (100 - 100)  ABP: --  ABP(mean): --  RR: 20 (18 - 20)  SpO2: 99% (97% - 99%)      Constitutional: Well appearing,NAD  HEENT: Atraumatic, ENRIQUETA, Normal, No congestion  Respiratory: Breath Sounds normal, no rhonchi/wheeze  Cardiovascular: N S1S2; DANTE present  Gastrointestinal: Abdomen soft, non tender, Bowel Ssounds present  Extremities: No B/l lower extremity and pedal edema ,except small area of erythema and swelling on great right toe  surrounding black eschar as described next   , Right great toe lysis of nail with dorsal black eschar,with surrounding 1st metatarsal skin erythema with mild swelling and tenderness,decreased sensation to tight touch  Neurological: AAO x 3, no gross focal motor deficits    Back: No CVA tenderness   Musculoskeletal: non tender  Breasts: Deferred  Genitourinary: deferred  Rectal: Deferred

## 2017-04-20 NOTE — H&P ADULT - ASSESSMENT
77 y.o. male with PMH VT s/p AICD, AFib on AC, CAD s/p CABGx2, DM2, gout, glaucoma,  HLD, chronic systolic  CHF, PVD, CKD 2-3,hx of vasectomy  ,S/p  R femoral endarterectomy feb /6/2017   presents to ED sent from Dr. Gamble's office for IV abx c/o pain, necrosis, erythema of infected first metatarsal on right foot. +Drainage. Has hx of recurrent infection in this toe. Had bypass in R[place] with Dr. Wesley which temporarily helped sx but sx have been worsening recently. States usually pulse is only detected with doppler.  Denies fever. NKDA.  Patient was seen in ED by podiatry dr Gamble Blood cx were drawn and zosyn and vanco admisnitered in ED EKG- NSR,nonspecific intraventricular block,no significant  ST/T chnages as compared to EKG done aug/2016  ROS- denies fever ,chills,chest pain ,SOB,headache,palpitations,abdominal pain,dysuria,flank pain ,vomiting,nausea ,diarrhea

## 2017-04-20 NOTE — PROGRESS NOTE ADULT - SUBJECTIVE AND OBJECTIVE BOX
Patient is a 77y old  Male who presents with a chief complaint of Pain R Great Toe PMHx of HTN PVD CAD T2DM Combo internal cardiac defibrilator & PPM CHF S/P Angio R LE Dr Royal    HPI:Not seen since 2/2/17 presented to office today / cellulitis & Gangrene R Hallux & Lysis of nail plate.      Allergies    No Known Allergies    Intolerances        MEDICATIONS  (STANDING):  vancomycin  IVPB 1000milliGRAM(s) IV Intermittent once  piperacillin/tazobactam IVPB. 3.375Gram(s) IV Intermittent once  Mexiletine 150 mg caps  Quinapril 5mg  Latanoprost 0.005% eye gutts in each eye daily  dorzololamide 22.3 mg-timolol 6.8 mg/ml 1 gutt in each eye daily  Flaxseed oil 1000 mg q day  Vit D 3 2000 Units Q day  KCL ER 10meq q dayfurosemide 40 mg q day  Warfarin 5 mg one tab po every other day  warfarin 5 mg 1/2 tab po three times weekly  ASA 81 mg qday  Atorvastatin 10 mg 0.5 tab by moth every other day  Digoxin 125 mcg one po daily  allopurinol 100 mg 1 tab by mouth daily  carvedilol 25 mg twice a day by mouth  pantoprazole 40 mg delay release one tab po daily    MEDICATIONS  (PRN):    + Former smoker no ETOH    PHYSICAL EXAM:Ltekgsq6 yo W M Nonpalpable DP/PT pulses, hair scat,multiple VV, + 2/5 edema. Muted sensorium SWM 5./07 muted to 4 plantar sites R Hallux with melody gangrene to Nail bed / lysis of plate. Cellulitis to MTP      Constitutional:    Eyes:Glaucoma    ENMT:    Neck:    Breasts:    Back:LBP    Respiratory:NO SOB    Cardiovascular:CAD / PPM AICD    Gastrointestinal:    Genitourinary:    Rectal:defer    Extremities:PVD Gangrene / fabian;lulitis R foot    Vascular:PVD    Neurological:Sensory neuropathy    Skin:atrophic, venous ds    Lymph Nodes:    Musculoskeletal:    Psychiatric:anxious        RADIOLOGYPatient is a 77y old  Male who presents with a chief complaint of     HPI:      Allergies    No Known Allergies    Intolerances        MEDICATIONS  (STANDING):  vancomycin  IVPB 1000milliGRAM(s) IV Intermittent once  piperacillin/tazobactam IVPB. 3.375Gram(s) IV Intermittent once    MEDICATIONS  (PRN):      PHYSICAL EXAM:      Constitutional:    Eyes:    ENMT:    Neck:    Breasts:    Back:    Respiratory:    Cardiovascular:    Gastrointestinal:    Genitourinary:    Rectal:    Extremities:    Vascular:    Neurological:    Skin:    Lymph Nodes:    Musculoskeletal:    Psychiatric:        RADIOLOGY

## 2017-04-20 NOTE — PROGRESS NOTE ADULT - ASSESSMENT
Pt / T2DM PVD Gangrene R Hallux Advise admit to  Vascular f/u Dr Royal ID consult Vanco/Zosyn Xray F/U

## 2017-04-20 NOTE — H&P ADULT - NSHPLABSRESULTS_GEN_ALL_CORE
13.8   11.6  )-----------( 146      ( 20 Apr 2017 20:19 )             40.0       CBC Full  -  ( 20 Apr 2017 20:19 )  WBC Count : 11.6 K/uL  Hemoglobin : 13.8 g/dL  Hematocrit : 40.0 %  Platelet Count - Automated : 146 K/uL  Mean Cell Volume : 86.7 fl  Mean Cell Hemoglobin : 29.9 pg  Mean Cell Hemoglobin Concentration : 34.5 gm/dL  Auto Neutrophil # : 8.7 K/uL  Auto Lymphocyte # : 1.7 K/uL  Auto Monocyte # : 0.8 K/uL  Auto Eosinophil # : 0.3 K/uL  Auto Basophil # : 0.1 K/uL  Auto Neutrophil % : 75.3 %  Auto Lymphocyte % : 14.3 %  Auto Monocyte % : 6.9 %  Auto Eosinophil % : 3.0 %  Auto Basophil % : 0.5 %      04-20    137  |  103  |  31<H>  ----------------------------<  226<H>  4.6   |  25  |  1.91<H>    Ca    9.3      20 Apr 2017 20:19    TPro  7.1  /  Alb  3.8  /  TBili  0.6  /  DBili  x   /  AST  23  /  ALT  35  /  AlkPhos  63  04-20      LIVER FUNCTIONS - ( 20 Apr 2017 20:19 )  Alb: 3.8 g/dL / Pro: 7.1 gm/dL / ALK PHOS: 63 U/L / ALT: 35 U/L / AST: 23 U/L / GGT: x

## 2017-04-20 NOTE — H&P ADULT - PROBLEM SELECTOR PLAN 2
hx of paroxysmal Afib,currently sinus rhythm  continue anticoagulation with warfarin  continue rate control with digoxin and coreg

## 2017-04-20 NOTE — ED STATDOCS - SKIN, MLM
Right first metatarsal: necrotic, some scant purulent discharge, proximal blanching erythema; unable to palate pulse but extremity is warm with good capillary refill.

## 2017-04-20 NOTE — ED STATDOCS - MEDICAL DECISION MAKING DETAILS
Pt with recurrent infection in right big toe, currently necrotic with purulent drainage and blanching erythema.  Sent by Dr. Corona for admission, r/o OM. XR, labs.

## 2017-04-20 NOTE — H&P ADULT - PROBLEM SELECTOR PLAN 3
CKD with possible mild KELLI- prerenal  s/p IVF in ED  f/u BMP in am  hold quinapril for tonight and evaluate BMP(BUN/Cr) in am  avoid further IVf for now due to hx of chronic systolic heart failure CKD with possible mild KELLI- prerenal    f/u BMP in am  hold quinapril for tonight and evaluate BMP(BUN/Cr) in am  avoid  IVf for now due to hx of chronic systolic heart failure

## 2017-04-20 NOTE — H&P ADULT - HISTORY OF PRESENT ILLNESS
76 y.o. male with PMH VT s/p AICD, AFib on AC, CAD s/p CABG, DM2, gout, glaucoma,  HLD, chronic systolic  CHF, PVD, CKD 2-3 ,S/p  R femoral endarterectomy feb /6/2017      PMH: as above  PSH:  CABG, AICD placement, vasectomy, R femoral endarterectomy   Allergy:   NKDA  SH:  ex smoker, quit about 30 years ago, denies drinking alcohol 77 y.o. male with PMH VT s/p AICD, AFib on AC, CAD s/p CABGx2, DM2, gout, glaucoma,  HLD, chronic systolic  CHF, PVD, CKD 2-3,hx of vasectomy  ,S/p  R femoral endarterectomy feb /6/2017   presents to ED sent from Dr. Gamble's office for IV abx c/o pain, necrosis, erythema of infected first metatarsal on right foot. +Drainage. Has hx of recurrent infection in this toe. Had bypass in [facility] with Dr. Wesley which temporarily helped sx but sx have been worsening recently. States usually pulse is only detected with doppler.  Denies fever. NKDA.  Patient was seen in ED by podiatry dr Gamble Blood cx were drawn and zosyn and vanco admisnitered in ED EKG- NSR,nonspecific intraventricular block,no significant  ST/T chnages as compared to EKG done aug/2016  ROS- denies fever ,chills,chest pain ,SOB,headache,palpitations,abdominal pain,dysuria,flank pain ,vomiting,nausea ,diarrhea PMH: as above PSH:  CABG, AICD placement, vasectomy, R femoral endarterectomy  Allergy:   NKDA SH:  ex smoker, quit about 30 years ago, denies drinking alcohol

## 2017-04-20 NOTE — PATIENT PROFILE ADULT. - VISION (WITH CORRECTIVE LENSES IF THE PATIENT USUALLY WEARS THEM):
hx gluacoma bilateral/Normal vision: sees adequately in most situations; can see medication labels, newsprint Normal vision: sees adequately in most situations; can see medication labels, newsprint/hx Glaucoma bilateral

## 2017-04-20 NOTE — ED STATDOCS - OBJECTIVE STATEMENT
76 y/o M with a hx of afib, CKD, CAD, DM, glaucoma, CHF, HLD, HTN, PVD, AICD, CABG X2, vasectomy presents to ED sent from Dr. Gamble's office for IV abx c/o pain, necrosis, erythema of infected first metatarsal on right foot. +Drainage. Has hx of recurrent infection in this toe. Had bypass in [facility] with Dr. Wesley which temporarily helped sx but sx have been worsening recently. States usually pulse is only detected with doppler.  Denies fever. NKDA.

## 2017-04-20 NOTE — H&P ADULT - PROBLEM SELECTOR PLAN 4
chronic systolic heart failure -Not acutely decompensated clinically   stable  continue coreg,digoxin  resume quinal pril from am  continue mexiletine   DVT prophylaxis -on coumadin  hx of DM 2 -controlled off meds chronic systolic heart failure -Not acutely decompensated clinically   stable  continue coreg,digoxin  resume quinal pril from am  continue mexiletine   lasix prn - hold lasix for now -monitor BMP  DVT prophylaxis -on coumadin  hx of DM 2 -controlled off meds

## 2017-04-20 NOTE — H&P ADULT - PROBLEM SELECTOR PLAN 1
Cellulitis right great toe r/o Osteomyelitis  Admit to Med surg  Iv zosyn  Iv vanco x1   blood cx  xray foot r/o osteomyelitis (f/u official report)  Unable to get MRI due to AICD  Consider bone scan if xray equivocal  ID consult   vascular consult with dr schwartz  continue anticoagulation with warfarin for hx of afib

## 2017-04-20 NOTE — ED STATDOCS - DETAILS:
I, Anca Hernandez, performed the initial face to face bedside interview with this patient regarding history of present illness, review of symptoms and relevant past medical, social and family history.  I completed an independent physical examination.  I was the initial provider who evaluated this patient. I have signed out the follow up of any pending tests (i.e. labs, radiological studies) to the ACP.  I have communicated the patient’s plan of care and disposition with the ACP.  The history, relevant review of systems, past medical and surgical history, medical decision making, and physical examination was documented by the scribe in my presence and I attest to the accuracy of the documentation.

## 2017-04-21 DIAGNOSIS — E11.9 TYPE 2 DIABETES MELLITUS WITHOUT COMPLICATIONS: ICD-10-CM

## 2017-04-21 DIAGNOSIS — I50.22 CHRONIC SYSTOLIC (CONGESTIVE) HEART FAILURE: ICD-10-CM

## 2017-04-21 LAB
ALBUMIN SERPL ELPH-MCNC: 3.6 G/DL — SIGNIFICANT CHANGE UP (ref 3.3–5)
ALP SERPL-CCNC: 58 U/L — SIGNIFICANT CHANGE UP (ref 40–120)
ALT FLD-CCNC: 32 U/L — SIGNIFICANT CHANGE UP (ref 12–78)
ANION GAP SERPL CALC-SCNC: 9 MMOL/L — SIGNIFICANT CHANGE UP (ref 5–17)
APTT BLD: 41.9 SEC — HIGH (ref 27.5–37.4)
APTT BLD: 44.8 SEC — HIGH (ref 27.5–37.4)
AST SERPL-CCNC: 18 U/L — SIGNIFICANT CHANGE UP (ref 15–37)
BASOPHILS # BLD AUTO: 0.1 K/UL — SIGNIFICANT CHANGE UP (ref 0–0.2)
BASOPHILS NFR BLD AUTO: 0.7 % — SIGNIFICANT CHANGE UP (ref 0–2)
BILIRUB SERPL-MCNC: 0.8 MG/DL — SIGNIFICANT CHANGE UP (ref 0.2–1.2)
BUN SERPL-MCNC: 30 MG/DL — HIGH (ref 7–23)
CALCIUM SERPL-MCNC: 9.4 MG/DL — SIGNIFICANT CHANGE UP (ref 8.5–10.1)
CHLORIDE SERPL-SCNC: 104 MMOL/L — SIGNIFICANT CHANGE UP (ref 96–108)
CO2 SERPL-SCNC: 25 MMOL/L — SIGNIFICANT CHANGE UP (ref 22–31)
CREAT SERPL-MCNC: 1.83 MG/DL — HIGH (ref 0.5–1.3)
CRP SERPL-MCNC: <0.2 MG/DL — SIGNIFICANT CHANGE UP (ref 0–0.4)
EOSINOPHIL # BLD AUTO: 0.4 K/UL — SIGNIFICANT CHANGE UP (ref 0–0.5)
EOSINOPHIL NFR BLD AUTO: 3.3 % — SIGNIFICANT CHANGE UP (ref 0–6)
GLUCOSE SERPL-MCNC: 159 MG/DL — HIGH (ref 70–99)
HBA1C BLD-MCNC: 8.3 % — HIGH (ref 4–5.6)
HCT VFR BLD CALC: 38 % — LOW (ref 39–50)
HGB BLD-MCNC: 13 G/DL — SIGNIFICANT CHANGE UP (ref 13–17)
INR BLD: 2.61 RATIO — HIGH (ref 0.88–1.16)
INR BLD: 2.68 RATIO — HIGH (ref 0.88–1.16)
LACTATE SERPL-SCNC: 1.6 MMOL/L — SIGNIFICANT CHANGE UP (ref 0.7–2)
LYMPHOCYTES # BLD AUTO: 17.3 % — SIGNIFICANT CHANGE UP (ref 13–44)
LYMPHOCYTES # BLD AUTO: 2 K/UL — SIGNIFICANT CHANGE UP (ref 1–3.3)
MCHC RBC-ENTMCNC: 30.3 PG — SIGNIFICANT CHANGE UP (ref 27–34)
MCHC RBC-ENTMCNC: 34.1 GM/DL — SIGNIFICANT CHANGE UP (ref 32–36)
MCV RBC AUTO: 89 FL — SIGNIFICANT CHANGE UP (ref 80–100)
MONOCYTES # BLD AUTO: 0.9 K/UL — SIGNIFICANT CHANGE UP (ref 0–0.9)
MONOCYTES NFR BLD AUTO: 7.5 % — SIGNIFICANT CHANGE UP (ref 2–14)
NEUTROPHILS # BLD AUTO: 8.1 K/UL — HIGH (ref 1.8–7.4)
NEUTROPHILS NFR BLD AUTO: 71.3 % — SIGNIFICANT CHANGE UP (ref 43–77)
PLATELET # BLD AUTO: 117 K/UL — LOW (ref 150–400)
POTASSIUM SERPL-MCNC: 4.3 MMOL/L — SIGNIFICANT CHANGE UP (ref 3.5–5.3)
POTASSIUM SERPL-SCNC: 4.3 MMOL/L — SIGNIFICANT CHANGE UP (ref 3.5–5.3)
PROT SERPL-MCNC: 6.7 GM/DL — SIGNIFICANT CHANGE UP (ref 6–8.3)
PROTHROM AB SERPL-ACNC: 28.8 SEC — HIGH (ref 9.8–12.7)
PROTHROM AB SERPL-ACNC: 29.5 SEC — HIGH (ref 9.8–12.7)
RBC # BLD: 4.28 M/UL — SIGNIFICANT CHANGE UP (ref 4.2–5.8)
RBC # FLD: 15.1 % — HIGH (ref 10.3–14.5)
SODIUM SERPL-SCNC: 138 MMOL/L — SIGNIFICANT CHANGE UP (ref 135–145)
WBC # BLD: 11.4 K/UL — HIGH (ref 3.8–10.5)
WBC # FLD AUTO: 11.4 K/UL — HIGH (ref 3.8–10.5)

## 2017-04-21 RX ORDER — CHOLECALCIFEROL (VITAMIN D3) 125 MCG
1 CAPSULE ORAL
Qty: 0 | Refills: 0 | COMMUNITY

## 2017-04-21 RX ORDER — QUINAPRIL HYDROCHLORIDE 40 MG/1
1 TABLET, FILM COATED ORAL
Qty: 0 | Refills: 0 | COMMUNITY

## 2017-04-21 RX ORDER — SODIUM CHLORIDE 9 MG/ML
1000 INJECTION, SOLUTION INTRAVENOUS
Qty: 0 | Refills: 0 | Status: DISCONTINUED | OUTPATIENT
Start: 2017-04-21 | End: 2017-04-27

## 2017-04-21 RX ORDER — GLUCAGON INJECTION, SOLUTION 0.5 MG/.1ML
1 INJECTION, SOLUTION SUBCUTANEOUS ONCE
Qty: 0 | Refills: 0 | Status: DISCONTINUED | OUTPATIENT
Start: 2017-04-21 | End: 2017-04-27

## 2017-04-21 RX ORDER — WARFARIN SODIUM 2.5 MG/1
1 TABLET ORAL
Qty: 0 | Refills: 0 | COMMUNITY

## 2017-04-21 RX ORDER — FUROSEMIDE 40 MG
1 TABLET ORAL
Qty: 0 | Refills: 0 | COMMUNITY

## 2017-04-21 RX ORDER — ASPIRIN/CALCIUM CARB/MAGNESIUM 324 MG
1 TABLET ORAL
Qty: 0 | Refills: 0 | COMMUNITY

## 2017-04-21 RX ORDER — DEXTROSE 50 % IN WATER 50 %
25 SYRINGE (ML) INTRAVENOUS ONCE
Qty: 0 | Refills: 0 | Status: DISCONTINUED | OUTPATIENT
Start: 2017-04-21 | End: 2017-04-27

## 2017-04-21 RX ORDER — DEXTROSE 50 % IN WATER 50 %
1 SYRINGE (ML) INTRAVENOUS ONCE
Qty: 0 | Refills: 0 | Status: DISCONTINUED | OUTPATIENT
Start: 2017-04-21 | End: 2017-04-27

## 2017-04-21 RX ORDER — DEXTROSE 50 % IN WATER 50 %
12.5 SYRINGE (ML) INTRAVENOUS ONCE
Qty: 0 | Refills: 0 | Status: DISCONTINUED | OUTPATIENT
Start: 2017-04-21 | End: 2017-04-27

## 2017-04-21 RX ORDER — INSULIN LISPRO 100/ML
VIAL (ML) SUBCUTANEOUS AT BEDTIME
Qty: 0 | Refills: 0 | Status: DISCONTINUED | OUTPATIENT
Start: 2017-04-21 | End: 2017-04-27

## 2017-04-21 RX ORDER — POTASSIUM CHLORIDE 20 MEQ
0 PACKET (EA) ORAL
Qty: 0 | Refills: 0 | COMMUNITY

## 2017-04-21 RX ORDER — INSULIN LISPRO 100/ML
VIAL (ML) SUBCUTANEOUS
Qty: 0 | Refills: 0 | Status: DISCONTINUED | OUTPATIENT
Start: 2017-04-21 | End: 2017-04-27

## 2017-04-21 RX ORDER — ATORVASTATIN CALCIUM 80 MG/1
5 TABLET, FILM COATED ORAL
Qty: 0 | Refills: 0 | COMMUNITY

## 2017-04-21 RX ADMIN — CARVEDILOL PHOSPHATE 25 MILLIGRAM(S): 80 CAPSULE, EXTENDED RELEASE ORAL at 18:07

## 2017-04-21 RX ADMIN — Medication 2: at 09:25

## 2017-04-21 RX ADMIN — LATANOPROST 1 DROP(S): 0.05 SOLUTION/ DROPS OPHTHALMIC; TOPICAL at 21:19

## 2017-04-21 RX ADMIN — Medication 1: at 18:06

## 2017-04-21 RX ADMIN — WARFARIN SODIUM 5 MILLIGRAM(S): 2.5 TABLET ORAL at 21:17

## 2017-04-21 RX ADMIN — Medication 100 MILLIGRAM(S): at 18:08

## 2017-04-21 RX ADMIN — ATORVASTATIN CALCIUM 5 MILLIGRAM(S): 80 TABLET, FILM COATED ORAL at 21:17

## 2017-04-21 RX ADMIN — PANTOPRAZOLE SODIUM 40 MILLIGRAM(S): 20 TABLET, DELAYED RELEASE ORAL at 09:26

## 2017-04-21 RX ADMIN — Medication 0.12 MILLIGRAM(S): at 21:18

## 2017-04-21 RX ADMIN — PIPERACILLIN AND TAZOBACTAM 25 GRAM(S): 4; .5 INJECTION, POWDER, LYOPHILIZED, FOR SOLUTION INTRAVENOUS at 06:41

## 2017-04-21 RX ADMIN — PIPERACILLIN AND TAZOBACTAM 25 GRAM(S): 4; .5 INJECTION, POWDER, LYOPHILIZED, FOR SOLUTION INTRAVENOUS at 21:18

## 2017-04-21 RX ADMIN — Medication 81 MILLIGRAM(S): at 21:51

## 2017-04-21 RX ADMIN — DORZOLAMIDE HYDROCHLORIDE TIMOLOL MALEATE 1 DROP(S): 20; 5 SOLUTION/ DROPS OPHTHALMIC at 18:07

## 2017-04-21 RX ADMIN — PIPERACILLIN AND TAZOBACTAM 25 GRAM(S): 4; .5 INJECTION, POWDER, LYOPHILIZED, FOR SOLUTION INTRAVENOUS at 15:03

## 2017-04-21 RX ADMIN — MEXILETINE HYDROCHLORIDE 150 MILLIGRAM(S): 150 CAPSULE ORAL at 06:41

## 2017-04-21 RX ADMIN — Medication 1: at 12:51

## 2017-04-21 RX ADMIN — MEXILETINE HYDROCHLORIDE 150 MILLIGRAM(S): 150 CAPSULE ORAL at 18:08

## 2017-04-21 RX ADMIN — LISINOPRIL 5 MILLIGRAM(S): 2.5 TABLET ORAL at 21:18

## 2017-04-21 RX ADMIN — DORZOLAMIDE HYDROCHLORIDE TIMOLOL MALEATE 1 DROP(S): 20; 5 SOLUTION/ DROPS OPHTHALMIC at 06:41

## 2017-04-21 RX ADMIN — CARVEDILOL PHOSPHATE 25 MILLIGRAM(S): 80 CAPSULE, EXTENDED RELEASE ORAL at 09:24

## 2017-04-21 NOTE — PROGRESS NOTE ADULT - ATTENDING COMMENTS
Patient seen and examined with LETY Laird.  Agree with physical exam and assessment and plan.   - plan to continue IV abx per ID recs  - podiatry and vascular surg recommendations apprecaited  - may need 1st toe partial amputation   - ?need for bone scan given amount of necrosis noted on exam

## 2017-04-21 NOTE — PROGRESS NOTE ADULT - SUBJECTIVE AND OBJECTIVE BOX
Patient is a 77y old  Male who presents with a chief complaint of infected Right  great toe x 3 months (20 Apr 2017 22:41)      HPI:  77 y.o. male with PMH VT s/p AICD, AFib on AC, CAD s/p CABGx2, DM2, gout, glaucoma,  HLD, chronic systolic  CHF, PVD, CKD 2-3,hx of vasectomy  ,S/p  R femoral endarterectomy feb /6/2017   presents to ED sent from Dr. Gamble's office for IV abx c/o pain, necrosis, erythema of infected first metatarsal on right foot. +Drainage. Has hx of recurrent infection in this toe. Had bypass in [facility] with Dr. Wesley which temporarily helped sx but sx have been worsening recently. States usually pulse is only detected with doppler.  Denies fever. NKDA.  Patient was seen in ED by podiatry dr Gamble Blood cx were drawn and zosyn and vanco admisnitered in ED EKG- NSR,nonspecific intraventricular block,no significant  ST/T chnages as compared to EKG done aug/2016  ROS- denies fever ,chills,chest pain ,SOB,headache,palpitations,abdominal pain,dysuria,flank pain ,vomiting,nausea ,diarrhea PMH: as above PSH:  CABG, AICD placement, vasectomy, R femoral endarterectomy  Allergy:   NKDA SH:  ex smoker, quit about 30 years ago, denies drinking alcohol (20 Apr 2017 21:57)      Allergies    No Known Allergies    Intolerances        MEDICATIONS  (STANDING):  aspirin enteric coated 81milliGRAM(s) Oral at bedtime  lisinopril 5milliGRAM(s) Oral at bedtime  digoxin     Tablet 0.125milliGRAM(s) Oral at bedtime  mexiletine 150milliGRAM(s) Oral every 12 hours  warfarin 2.5milliGRAM(s) Oral <User Schedule>  warfarin 5milliGRAM(s) Oral <User Schedule>  allopurinol 100milliGRAM(s) Oral after dinner  carvedilol 25milliGRAM(s) Oral every 12 hours  dorzolamide 2%/timolol 0.5% Ophthalmic Solution 1Drop(s) Left EYE two times a day  latanoprost 0.005% Ophthalmic Solution 1Drop(s) Left EYE at bedtime  pantoprazole    Tablet 40milliGRAM(s) Oral before breakfast  atorvastatin 5milliGRAM(s) Oral at bedtime  piperacillin/tazobactam IVPB. 3.375Gram(s) IV Intermittent every 8 hours  insulin lispro (HumaLOG) corrective regimen sliding scale  SubCutaneous three times a day before meals  insulin lispro (HumaLOG) corrective regimen sliding scale  SubCutaneous at bedtime  dextrose 5%. 1000milliLiter(s) IV Continuous <Continuous>  dextrose 50% Injectable 12.5Gram(s) IV Push once  dextrose 50% Injectable 25Gram(s) IV Push once  dextrose 50% Injectable 25Gram(s) IV Push once    MEDICATIONS  (PRN):  dextrose Gel 1Dose(s) Oral once PRN Blood Glucose LESS THAN 70 milliGRAM(s)/deciliter  glucagon  Injectable 1milliGRAM(s) IntraMuscular once PRN Glucose LESS THAN 70 milligrams/deciliter        RADIOLOGY    CBC Full  -  ( 21 Apr 2017 05:43 )  WBC Count : 11.4 K/uL  Hemoglobin : 13.0 g/dL  Hematocrit : 38.0 %  Platelet Count - Automated : 117 K/uL  Mean Cell Volume : 89.0 fl  Mean Cell Hemoglobin : 30.3 pg  Mean Cell Hemoglobin Concentration : 34.1 gm/dL  Auto Neutrophil # : 8.1 K/uL  Auto Lymphocyte # : 2.0 K/uL  Auto Monocyte # : 0.9 K/uL  Auto Eosinophil # : 0.4 K/uL  Auto Basophil # : 0.1 K/uL  Auto Neutrophil % : 71.3 %  Auto Lymphocyte % : 17.3 %  Auto Monocyte % : 7.5 %  Auto Eosinophil % : 3.3 %  Auto Basophil % : 0.7 %      04-21    138  |  104  |  30<H>  ----------------------------<  159<H>  4.3   |  25  |  1.83<H>    Ca    9.4      21 Apr 2017 05:43    TPro  6.7  /  Alb  3.6  /  TBili  0.8  /  DBili  x   /  AST  18  /  ALT  32  /  AlkPhos  58  04-21      Sedimentation Rate, Erythrocyte: 6 mm/hr (04-20 @ 20:19)

## 2017-04-21 NOTE — CONSULT NOTE ADULT - ASSESSMENT
76 y/o male with a h/o VT s/p AICD, AFib on AC, CAD s/p CABGx2, DM2, gout, glaucoma,  HLD, chronic systolic  CHF, PVD, CKD 2-3,hx of vasectomy,S/p R femoral endarterectomy 2/6//2017 came to the ED on 4/20/17 after being sent from Dr. Gamble's office for IV abx due to pain, necrosis, erythema of infected first metatarsal of right foot. Pt states the wound began approximatley 3 months ago and there has been a clear fluid drainage. Pt states he has a hx of recurrent infection in this toe. States he had bypass in [facility] with Dr. Royal 2 months ago which temporarily helped symptoms but symptoms have been worsening recently. Denies fever, chills, nausea, vomiting, SOB, chest pain, headache, abdominal pain. 78 y/o male with a h/o VT s/p AICD, AFib on AC, CAD s/p CABGx2, DM2, gout, glaucoma,  HLD, chronic systolic  CHF, PVD, CKD 2-3,hx of vasectomy,S/p R femoral endarterectomy 2/6//2017 came to the ED on 4/20/17 after being sent from Dr. Gamble's office for IV abx due to pain, necrosis, erythema of infected first metatarsal of right foot. Pt states the wound began approximatley 3 months ago and there has been a clear fluid drainage. Pt states he has a hx of recurrent infection in this toe. States he had bypass in [facility] with Dr. Royal 2 months ago which temporarily helped symptoms but symptoms have been worsening recently. Denies fever, chills, nausea, vomiting, SOB, chest pain, headache, abdominal pain.  1. Right first toe cellulitis with area of gangrene  - follow up cultures   - elevate legs   - agree with zosyn as ordered  - will add vancomycin to treat resistant bacteria   - check vancomycin trough prior to fourth dose   -podiatry eval in progress  2. other issues: VT s/p AICD, AFib on AC, CAD s/p CABGx2, DM2, gout, glaucoma,  HLD, chronic systolic  CHF, PVD, CKD 2-3  - per medicine

## 2017-04-21 NOTE — PROGRESS NOTE ADULT - SUBJECTIVE AND OBJECTIVE BOX
HPI: 78 y/o male with a h/o VT s/p AICD, AFib on AC, CAD s/p CABGx2, DM2, gout, glaucoma,  HLD, chronic systolic CHF, PVD, CKD 2-3,hx of vasectomy, S/p R femoral endarterectomy on 2/6/2017, came to the ED on 4/20/17 after being sent from Dr. Gamble's office for IV abx due to pain, necrosis, erythema of infected first metatarsal of right foot. Pt states the wound began approximately 3 months ago, and there has been a clear fluid drainage. Pt states he has a hx of recurrent infection in this toe. Had bypass in [facility] with Dr. Royal 2 months ago which temporarily helped symptoms but symptoms have been worsening recently.     4/21: Pt seen and examined. No new complaints.     MEDICATIONS  (STANDING):  aspirin enteric coated 81milliGRAM(s) Oral at bedtime  lisinopril 5milliGRAM(s) Oral at bedtime  digoxin     Tablet 0.125milliGRAM(s) Oral at bedtime  mexiletine 150milliGRAM(s) Oral every 12 hours  warfarin 2.5milliGRAM(s) Oral <User Schedule>  warfarin 5milliGRAM(s) Oral <User Schedule>  allopurinol 100milliGRAM(s) Oral after dinner  carvedilol 25milliGRAM(s) Oral every 12 hours  dorzolamide 2%/timolol 0.5% Ophthalmic Solution 1Drop(s) Left EYE two times a day  latanoprost 0.005% Ophthalmic Solution 1Drop(s) Left EYE at bedtime  pantoprazole    Tablet 40milliGRAM(s) Oral before breakfast  atorvastatin 5milliGRAM(s) Oral at bedtime  piperacillin/tazobactam IVPB. 3.375Gram(s) IV Intermittent every 8 hours  insulin lispro (HumaLOG) corrective regimen sliding scale  SubCutaneous three times a day before meals  insulin lispro (HumaLOG) corrective regimen sliding scale  SubCutaneous at bedtime  dextrose 5%. 1000milliLiter(s) IV Continuous <Continuous>  dextrose 50% Injectable 12.5Gram(s) IV Push once  dextrose 50% Injectable 25Gram(s) IV Push once  dextrose 50% Injectable 25Gram(s) IV Push once    MEDICATIONS  (PRN):  dextrose Gel 1Dose(s) Oral once PRN Blood Glucose LESS THAN 70 milliGRAM(s)/deciliter  glucagon  Injectable 1milliGRAM(s) IntraMuscular once PRN Glucose LESS THAN 70 milligrams/deciliter    REVIEW OF SYSTEMS:  CONSTITUTIONAL: No weakness, fevers or chills  EYES/ENT: No visual changes;  No vertigo or throat pain   NECK: No pain or stiffness  RESPIRATORY: No cough, wheezing, hemoptysis; No shortness of breath  CARDIOVASCULAR: No chest pain or palpitations  GASTROINTESTINAL: No abdominal or epigastric pain. No nausea, vomiting, or hematemesis; No diarrhea or constipation. No melena or hematochezia.  GENITOURINARY: No dysuria, frequency or hematuria  NEUROLOGICAL: No numbness or weakness  SKIN: Necrotic ulcer of the right 1st metatarsal.  All other review of systems is negative unless indicated above    Vital Signs Last 24 Hrs  T(C): 36.5, Max: 36.8 (04-20 @ 18:54)  T(F): 97.7, Max: 98.3 (04-20 @ 18:54)  HR: 70 (70 - 88)  BP: 152/58 (125/56 - 165/80)  BP(mean): 100 (100 - 100)  RR: 16 (16 - 20)  SpO2: 98% (97% - 99%)    PHYSICAL EXAM:  Constitutional: Well appearing, NAD  HEENT: Atraumatic, ENRIQUETA, Normal, No congestion  Respiratory: Breath Sounds normal b/l, no rales/rhonchi/wheeze  Cardiovascular: Normal S1S2; No murmurs, gallops, or rubs.  Gastrointestinal: Nondistended. Abdomen soft, non tender, Bowel sounds present in all 4 quadrants.  Extremities: Erythema and swelling of medial side of 1st metatarsal of right toe with surrounding black eschar. Lysis of nail of 1st metatarsal, tender to palpation. No edema of lower extremities.   Neurological: AAO x 3, no gross focal motor deficits  Back: No CVA tenderness   Musculoskeletal: non tender  Breasts: Deferred  Genitourinary: deferred  Rectal: Deferred    LABS: All Labs Reviewed:                    13.0   11.4  )-----------( 117      ( 21 Apr 2017 05:43 )             38.0     04-21    138  |  104  |  30<H>  ----------------------------<  159<H>  4.3   |  25  |  1.83<H>    Ca    9.4      21 Apr 2017 05:43    TPro  6.7  /  Alb  3.6  /  TBili  0.8  /  DBili  x   /  AST  18  /  ALT  32  /  AlkPhos  58  04-21    LIVER FUNCTIONS - ( 21 Apr 2017 05:43 )  Alb: 3.6 g/dL / Pro: 6.7 gm/dL / ALK PHOS: 58 U/L / ALT: 32 U/L / AST: 18 U/L / GGT: x           PT/INR - ( 21 Apr 2017 05:43 )   PT: 29.5 sec;   INR: 2.68 ratio      PTT - ( 21 Apr 2017 05:43 )  PTT:41.9 sec    Lactate, Blood: 1.6 mmol/L (04-21 @ 00:01)    CAPILLARY BLOOD GLUCOSE  224 (21 Apr 2017 08:22)    Blood Culture: Pending     RADIOLOGY/EKG:  EXAM:  FOOT-RIGHT                            PROCEDURE DATE:  04/20/2017        INTERPRETATION:  Right foot    Indication: Back toe infection and swelling, rule out osteomyelitis    IMPRESSION: 3 views demonstrate superficial ulceration at the medial   aspect of the first distal phalanx. No underlying ostial lysis or soft   tissue gas, no visible fracture or dislocation. Vascular calcifications   are noted.      EXAM:  CHEST SINGLE VIEW FRONTAL                            PROCEDURE DATE:  04/20/2017        INTERPRETATION:    INDICATION: History of diabetes great artery disease.    Single frontal view of chest dated 4/20/2017 9:16 PM.  Prior study of   1/26/2017.    FINDINGS /   IMPRESSION:     There is no acute consolidation.  There are no pleural effusion. Patient   is status post sternotomy. There is cardiomegaly. There is dual-lead   pacemaker.    There are degenerative changes.

## 2017-04-21 NOTE — ED ADULT NURSE REASSESSMENT NOTE - NS ED NURSE REASSESS COMMENT FT1
Pt. right big toe noted to have a black area approx size of 50 cent piece on top, medial to the nail bed.  Dry dressing applied.  pt. IV to lock.  pt. vitals stable and charted on flowsheet.  pt. medicated as per order.  pt. report given to [address] [place] [address] and Pt. stable for transport to [address] [place] [address]

## 2017-04-21 NOTE — PROGRESS NOTE ADULT - ASSESSMENT
Cellulitis just about gone R forefoot. Gangrenous RHallux. Continue ABX Await Vascular eval. Will F/U THANKS

## 2017-04-21 NOTE — PROGRESS NOTE ADULT - PROBLEM SELECTOR PLAN 1
- Cellulitis of right hallux. R/o osteomyelitis.  - Received IV Vanco x 1.  Continue IV Zosyn.  - Blood cx pending.  - ID and Vascular consults appreciated.

## 2017-04-21 NOTE — PROGRESS NOTE ADULT - ASSESSMENT
76 yo male with PMH of VT s/p AICD, AFib on AC, CAD s/p CABGx2, DM2, gout, glaucoma,  HLD, chronic systolic CHF, PVD, CKD 2-3,hx of vasectomy, S/p R femoral endarterectomy on 2/6/2017, admitted for toe infection.

## 2017-04-22 LAB
APTT BLD: 41.4 SEC — HIGH (ref 27.5–37.4)
INR BLD: 2.62 RATIO — HIGH (ref 0.88–1.16)
PROTHROM AB SERPL-ACNC: 28.9 SEC — HIGH (ref 9.8–12.7)

## 2017-04-22 PROCEDURE — 73630 X-RAY EXAM OF FOOT: CPT | Mod: 26,LT

## 2017-04-22 RX ORDER — VANCOMYCIN HCL 1 G
750 VIAL (EA) INTRAVENOUS EVERY 24 HOURS
Qty: 0 | Refills: 0 | Status: DISCONTINUED | OUTPATIENT
Start: 2017-04-22 | End: 2017-04-27

## 2017-04-22 RX ADMIN — Medication 1: at 18:37

## 2017-04-22 RX ADMIN — LISINOPRIL 5 MILLIGRAM(S): 2.5 TABLET ORAL at 21:27

## 2017-04-22 RX ADMIN — PIPERACILLIN AND TAZOBACTAM 25 GRAM(S): 4; .5 INJECTION, POWDER, LYOPHILIZED, FOR SOLUTION INTRAVENOUS at 05:57

## 2017-04-22 RX ADMIN — Medication 100 MILLIGRAM(S): at 18:34

## 2017-04-22 RX ADMIN — CARVEDILOL PHOSPHATE 25 MILLIGRAM(S): 80 CAPSULE, EXTENDED RELEASE ORAL at 05:57

## 2017-04-22 RX ADMIN — LATANOPROST 1 DROP(S): 0.05 SOLUTION/ DROPS OPHTHALMIC; TOPICAL at 21:28

## 2017-04-22 RX ADMIN — Medication 0.12 MILLIGRAM(S): at 21:27

## 2017-04-22 RX ADMIN — Medication 81 MILLIGRAM(S): at 21:27

## 2017-04-22 RX ADMIN — Medication 150 MILLIGRAM(S): at 15:12

## 2017-04-22 RX ADMIN — PIPERACILLIN AND TAZOBACTAM 25 GRAM(S): 4; .5 INJECTION, POWDER, LYOPHILIZED, FOR SOLUTION INTRAVENOUS at 21:26

## 2017-04-22 RX ADMIN — PIPERACILLIN AND TAZOBACTAM 25 GRAM(S): 4; .5 INJECTION, POWDER, LYOPHILIZED, FOR SOLUTION INTRAVENOUS at 16:11

## 2017-04-22 RX ADMIN — PANTOPRAZOLE SODIUM 40 MILLIGRAM(S): 20 TABLET, DELAYED RELEASE ORAL at 05:58

## 2017-04-22 RX ADMIN — ATORVASTATIN CALCIUM 5 MILLIGRAM(S): 80 TABLET, FILM COATED ORAL at 21:27

## 2017-04-22 RX ADMIN — MEXILETINE HYDROCHLORIDE 150 MILLIGRAM(S): 150 CAPSULE ORAL at 18:35

## 2017-04-22 RX ADMIN — DORZOLAMIDE HYDROCHLORIDE TIMOLOL MALEATE 1 DROP(S): 20; 5 SOLUTION/ DROPS OPHTHALMIC at 18:41

## 2017-04-22 RX ADMIN — Medication 2: at 15:09

## 2017-04-22 RX ADMIN — DORZOLAMIDE HYDROCHLORIDE TIMOLOL MALEATE 1 DROP(S): 20; 5 SOLUTION/ DROPS OPHTHALMIC at 05:57

## 2017-04-22 RX ADMIN — MEXILETINE HYDROCHLORIDE 150 MILLIGRAM(S): 150 CAPSULE ORAL at 05:57

## 2017-04-22 RX ADMIN — Medication 1: at 09:37

## 2017-04-22 NOTE — PROGRESS NOTE ADULT - SUBJECTIVE AND OBJECTIVE BOX
Patient is a 77y old  Male who presents with a chief complaint of infected Right  great toe x 3 months (20 Apr 2017 22:41)      HPI:  77 y.o. male with PMH VT s/p AICD, AFib on AC, CAD s/p CABGx2, DM2, gout, glaucoma,  HLD, chronic systolic  CHF, PVD, CKD 2-3,hx of vasectomy  ,S/p  R femoral endarterectomy feb /6/2017   presents to ED sent from Dr. Gamble's office for IV abx c/o pain, necrosis, erythema of infected first metatarsal on right foot. +Drainage. Has hx of recurrent infection in this toe. Had bypass in [place] with Dr. Wesley which temporarily helped sx but sx have been worsening recently. States usually pulse is only detected with doppler.  Denies fever. NKDA.  Patient was seen in ED by podiatry dr Gamble Blood cx were drawn and zosyn and vanco admisnitered in ED EKG- NSR,nonspecific intraventricular block,no significant  ST/T chnages as compared to EKG done aug/2016  ROS- denies fever ,chills,chest pain ,SOB,headache,palpitations,abdominal pain,dysuria,flank pain ,vomiting,nausea ,diarrhea PMH: as above PSH:  CABG, AICD placement, vasectomy, R femoral endarterectomy  Allergy:   NKDA SH:  ex smoker, quit about 30 years ago, denies drinking alcohol (20 Apr 2017 21:57)      Allergies    No Known Allergies    Intolerances        MEDICATIONS  (STANDING):  aspirin enteric coated 81milliGRAM(s) Oral at bedtime  lisinopril 5milliGRAM(s) Oral at bedtime  digoxin     Tablet 0.125milliGRAM(s) Oral at bedtime  mexiletine 150milliGRAM(s) Oral every 12 hours  warfarin 2.5milliGRAM(s) Oral <User Schedule>  warfarin 5milliGRAM(s) Oral <User Schedule>  allopurinol 100milliGRAM(s) Oral after dinner  carvedilol 25milliGRAM(s) Oral every 12 hours  dorzolamide 2%/timolol 0.5% Ophthalmic Solution 1Drop(s) Left EYE two times a day  latanoprost 0.005% Ophthalmic Solution 1Drop(s) Left EYE at bedtime  pantoprazole    Tablet 40milliGRAM(s) Oral before breakfast  atorvastatin 5milliGRAM(s) Oral at bedtime  piperacillin/tazobactam IVPB. 3.375Gram(s) IV Intermittent every 8 hours  insulin lispro (HumaLOG) corrective regimen sliding scale  SubCutaneous three times a day before meals  insulin lispro (HumaLOG) corrective regimen sliding scale  SubCutaneous at bedtime  dextrose 5%. 1000milliLiter(s) IV Continuous <Continuous>  dextrose 50% Injectable 12.5Gram(s) IV Push once  dextrose 50% Injectable 25Gram(s) IV Push once  dextrose 50% Injectable 25Gram(s) IV Push once    MEDICATIONS  (PRN):  dextrose Gel 1Dose(s) Oral once PRN Blood Glucose LESS THAN 70 milliGRAM(s)/deciliter  glucagon  Injectable 1milliGRAM(s) IntraMuscular once PRN Glucose LESS THAN 70 milligrams/deciliter        RADIOLOGY    CBC Full  -  ( 21 Apr 2017 05:43 )  WBC Count : 11.4 K/uL  Hemoglobin : 13.0 g/dL  Hematocrit : 38.0 %  Platelet Count - Automated : 117 K/uL  Mean Cell Volume : 89.0 fl  Mean Cell Hemoglobin : 30.3 pg  Mean Cell Hemoglobin Concentration : 34.1 gm/dL  Auto Neutrophil # : 8.1 K/uL  Auto Lymphocyte # : 2.0 K/uL  Auto Monocyte # : 0.9 K/uL  Auto Eosinophil # : 0.4 K/uL  Auto Basophil # : 0.1 K/uL  Auto Neutrophil % : 71.3 %  Auto Lymphocyte % : 17.3 %  Auto Monocyte % : 7.5 %  Auto Eosinophil % : 3.3 %  Auto Basophil % : 0.7 %      04-21    138  |  104  |  30<H>  ----------------------------<  159<H>  4.3   |  25  |  1.83<H>    Ca    9.4      21 Apr 2017 05:43    TPro  6.7  /  Alb  3.6  /  TBili  0.8  /  DBili  x   /  AST  18  /  ALT  32  /  AlkPhos  58  04-21      Hemoglobin A1C, Whole Blood: 8.3: Test repeated.  Method: Immunoassay       Reference Range                4.0-5.6%       High risk (prediabetic)        5.7-6.4%       Diabetic, diagnostic             >=6.5%       ADA diabetic treatment goal       <7.0%  The Hemoglobin A1c reference r8.3: anges are based on the 2010 recommendations  of  The American Diabetes Association.  Interpretation may vary for children  and  adolescent % (04.21.17 @ 05:43)

## 2017-04-22 NOTE — PROGRESS NOTE ADULT - PROBLEM SELECTOR PLAN 1
- Cellulitis of right hallux with gangrene  - Received IV Vanco x 1.  Continue IV Zosyn.  - Blood cx pending.  - Podiatry, ID and Vascular consults appreciated. - Cellulitis of right hallux with gangrene  - Received IV Vanco x 1.  Continue IV Zosyn.  - Blood cx pending.  - Podiatry, ID and Vascular consults appreciated and plan is for tagged WBC scan on monday with plan for possible surgery tues/wed based on NM scan results as per dr james

## 2017-04-22 NOTE — PROGRESS NOTE ADULT - PROBLEM SELECTOR PLAN 2
- Hx of afib.  - Continue warfarin, digoxin, and carvedilol. - Hx of afib.  - Continue digoxin, and carvedilol.  - will hold coumadin in anticipation of surgery and let it drift down

## 2017-04-22 NOTE — PROGRESS NOTE ADULT - SUBJECTIVE AND OBJECTIVE BOX
78 y/o male with a h/o VT s/p AICD, AFib on AC, CAD s/p CABGx2, DM2, gout, glaucoma,  HLD, chronic systolic  CHF, PVD, CKD 2-3,hx of vasectomy,S/p R femoral endarterectomy 2/6//2017 came to the ED on 4/20/17 after being sent from Dr. Gamble's office for IV abx due to pain, necrosis, erythema of infected first metatarsal of right foot. Pt states the wound began approximatley 3 months ago and there has been a clear fluid drainage. Pt states he has a hx of recurrent infection in this toe. States he had bypass in [place] with Dr. Royal 2 months ago which temporarily helped symptoms but symptoms have been worsening recently. Denies fever, chills, nausea, vomiting, SOB, chest pain, headache, abdominal pain.  Today 4/22 patient ambulating      MEDICATIONS  (STANDING):  aspirin enteric coated 81milliGRAM(s) Oral at bedtime  lisinopril 5milliGRAM(s) Oral at bedtime  digoxin     Tablet 0.125milliGRAM(s) Oral at bedtime  mexiletine 150milliGRAM(s) Oral every 12 hours  allopurinol 100milliGRAM(s) Oral after dinner  carvedilol 25milliGRAM(s) Oral every 12 hours  dorzolamide 2%/timolol 0.5% Ophthalmic Solution 1Drop(s) Left EYE two times a day  latanoprost 0.005% Ophthalmic Solution 1Drop(s) Left EYE at bedtime  pantoprazole    Tablet 40milliGRAM(s) Oral before breakfast  atorvastatin 5milliGRAM(s) Oral at bedtime  piperacillin/tazobactam IVPB. 3.375Gram(s) IV Intermittent every 8 hours  insulin lispro (HumaLOG) corrective regimen sliding scale  SubCutaneous three times a day before meals  insulin lispro (HumaLOG) corrective regimen sliding scale  SubCutaneous at bedtime  dextrose 5%. 1000milliLiter(s) IV Continuous <Continuous>  dextrose 50% Injectable 12.5Gram(s) IV Push once  dextrose 50% Injectable 25Gram(s) IV Push once  dextrose 50% Injectable 25Gram(s) IV Push once    MEDICATIONS  (PRN):  dextrose Gel 1Dose(s) Oral once PRN Blood Glucose LESS THAN 70 milliGRAM(s)/deciliter  glucagon  Injectable 1milliGRAM(s) IntraMuscular once PRN Glucose LESS THAN 70 milligrams/deciliter      Vital Signs Last 24 Hrs  T(C): 36.3, Max: 36.9 (04-22 @ 05:48)  T(F): 97.3, Max: 98.4 (04-22 @ 05:48)  HR: 72 (69 - 75)  BP: 129/57 (116/41 - 133/50)  BP(mean): --  RR: 18 (16 - 18)  SpO2: 98% (97% - 98%)    Physical Exam:              PHYSICAL EXAM:  Constitutional: Well appearing,NAD  HEENT: Atraumatic, ENRIQUETA, Normal, No congestion  Respiratory: Breath Sounds normal, no rhonchi/wheeze  Cardiovascular: N S1S2; DANTE present  Gastrointestinal: Abdomen soft, non tender, Bowel sounds present  Extremities: No B/l lower extremity and pedal edema, except small area of erythema and swelling on great right toe surrounding black eschar. Right great toe lysis of nail with dorsal black eschar,with surrounding 1st metatarsal  skin erythema with mild swelling and tenderness, decreased sensation to tight touch  Neurological: AAO x 3, no gross focal motor deficits    Back: No CVA tenderness   Musculoskeletal: non tender  Breasts: Deferred  Genitourinary: deferred  Rectal: Deferred    LABS: All Labs Reviewed:                        13.0   11.4  )-----------( 117      ( 21 Apr 2017 05:43 )             38.0     04-21    138  |  104  |  30<H>  ----------------------------<  159<H>  4.3   |  25  |  1.83<H>    Ca    9.4      21 Apr 2017 05:43    TPro  6.7  /  Alb  3.6  /  TBili  0.8  /  DBili  x   /  AST  18  /  ALT  32  /  AlkPhos  58  04-21    PT/INR - ( 21 Apr 2017 05:43 )   PT: 29.5 sec;   INR: 2.68 ratio         PTT - ( 21 Apr 2017 05:43 )  PTT:41.9 sec      Blood Culture: Pending     RADIOLOGY/EKG:  EXAM:  FOOT-RIGHT                            PROCEDURE DATE:  04/20/2017        INTERPRETATION:  Right foot    Indication: Back toe infection and swelling, rule out osteomyelitis    IMPRESSION: 3 views demonstrate superficial ulceration at the medial   aspect of the first distal phalanx. No underlying ostial lysis or soft   tissue gas, no visible fracture or dislocation. Vascular calcifications   are noted.      EXAM:  CHEST SINGLE VIEW FRONTAL                            PROCEDURE DATE:  04/20/2017        INTERPRETATION:    INDICATION: History of diabetes great artery disease.    Single frontal view of chest dated 4/20/2017 9:16 PM.  Prior study of   1/26/2017.    FINDINGS /   IMPRESSION:     There is no acute consolidation.  There are no pleural effusion. Patient   is status post sternotomy. There is cardiomegaly. There is dual-lead   pacemaker.    There are degenerative changes.       Code status: Full resuscitation

## 2017-04-22 NOTE — PROGRESS NOTE ADULT - ASSESSMENT
Pt / gangrene to R Hallux. Discussed with him Tx options. Advised amputation of Great Toe to ablate infective process. He wants to do a bone scan to see if toe is infected. Refused Sx for Monday. Will order bone scan No xray to date HbA1c 8.3

## 2017-04-22 NOTE — PROGRESS NOTE ADULT - ASSESSMENT
78 y/o male with a h/o VT s/p AICD, AFib on AC, CAD s/p CABGx2, DM2, gout, glaucoma,  HLD, chronic systolic  CHF, PVD, CKD 2-3,hx of vasectomy,S/p R femoral endarterectomy 2/6//2017 came to the ED on 4/20/17 after being sent from Dr. Gamble's office for IV abx due to pain, necrosis, erythema of infected first metatarsal of right foot. Pt states the wound began approximatley 3 months ago and there has been a clear fluid drainage. Pt states he has a hx of recurrent infection in this toe. States he had bypass in [place] with Dr. Royal 2 months ago which temporarily helped symptoms but symptoms have been worsening recently. Denies fever, chills, nausea, vomiting, SOB, chest pain, headache, abdominal pain.  1. Right first toe cellulitis with area of gangrene  - follow up cultures   - elevate legs   - day # 2 zosyn and vancomycin  - tolerating antibiotics without rashes or side effects   - check vancomycin trough prior to fourth dose   -podiatry eval in progress  2. other issues: VT s/p AICD, AFib on AC, CAD s/p CABGx2, DM2, gout, glaucoma,  HLD, chronic systolic  CHF, PVD, CKD 2-3  - per medicine

## 2017-04-22 NOTE — PROGRESS NOTE ADULT - ASSESSMENT
78 yo male with PMH of VT s/p AICD, AFib on AC, CAD s/p CABGx2, DM2, gout, glaucoma,  HLD, chronic systolic CHF, PVD, CKD 2-3,hx of vasectomy, S/p R femoral endarterectomy on 2/6/2017, admitted for toe infection.

## 2017-04-22 NOTE — PROGRESS NOTE ADULT - SUBJECTIVE AND OBJECTIVE BOX
HPI: 78 y/o male with a h/o VT s/p AICD, AFib on AC, CAD s/p CABGx2, DM2, gout, glaucoma,  HLD, chronic systolic CHF, PVD, CKD 2-3,hx of vasectomy, S/p R femoral endarterectomy on 2/6/2017, came to the ED on 4/20/17 after being sent from Dr. Gamble's office for IV abx due to pain, necrosis, erythema of infected first metatarsal of right foot. Pt states the wound began approximately 3 months ago, and there has been a clear fluid drainage. Pt states he has a hx of recurrent infection in this toe. Had bypass in [place] with Dr. Royal 2 months ago which temporarily helped symptoms but symptoms have been worsening recently.     4/22: Pt seen and examined. No new complaints.     MEDICATIONS  (STANDING):  aspirin enteric coated 81milliGRAM(s) Oral at bedtime  lisinopril 5milliGRAM(s) Oral at bedtime  digoxin     Tablet 0.125milliGRAM(s) Oral at bedtime  mexiletine 150milliGRAM(s) Oral every 12 hours  warfarin 2.5milliGRAM(s) Oral <User Schedule>  warfarin 5milliGRAM(s) Oral <User Schedule>  allopurinol 100milliGRAM(s) Oral after dinner  carvedilol 25milliGRAM(s) Oral every 12 hours  dorzolamide 2%/timolol 0.5% Ophthalmic Solution 1Drop(s) Left EYE two times a day  latanoprost 0.005% Ophthalmic Solution 1Drop(s) Left EYE at bedtime  pantoprazole    Tablet 40milliGRAM(s) Oral before breakfast  atorvastatin 5milliGRAM(s) Oral at bedtime  piperacillin/tazobactam IVPB. 3.375Gram(s) IV Intermittent every 8 hours  insulin lispro (HumaLOG) corrective regimen sliding scale  SubCutaneous three times a day before meals  insulin lispro (HumaLOG) corrective regimen sliding scale  SubCutaneous at bedtime  dextrose 5%. 1000milliLiter(s) IV Continuous <Continuous>  dextrose 50% Injectable 12.5Gram(s) IV Push once  dextrose 50% Injectable 25Gram(s) IV Push once  dextrose 50% Injectable 25Gram(s) IV Push once    MEDICATIONS  (PRN):  dextrose Gel 1Dose(s) Oral once PRN Blood Glucose LESS THAN 70 milliGRAM(s)/deciliter  glucagon  Injectable 1milliGRAM(s) IntraMuscular once PRN Glucose LESS THAN 70 milligrams/deciliter    REVIEW OF SYSTEMS:  CONSTITUTIONAL: No weakness, fevers or chills  EYES/ENT: No visual changes;  No vertigo or throat pain   NECK: No pain or stiffness  RESPIRATORY: No cough, wheezing, hemoptysis; No shortness of breath  CARDIOVASCULAR: No chest pain or palpitations  GASTROINTESTINAL: No abdominal or epigastric pain. No nausea, vomiting, or hematemesis; No diarrhea or constipation. No melena or hematochezia.  GENITOURINARY: No dysuria, frequency or hematuria  NEUROLOGICAL: No numbness or weakness  SKIN: Necrotic ulcer of the right 1st metatarsal.  All other review of systems is negative unless indicated above    Vital Signs Last 24 Hrs  T(C): 36.3, Max: 36.9 (04-22 @ 05:48)  T(F): 97.3, Max: 98.4 (04-22 @ 05:48)  HR: 72 (69 - 75)  BP: 129/57 (116/41 - 133/50)  BP(mean): --  RR: 18 (16 - 18)  SpO2: 98% (97% - 98%)    PHYSICAL EXAM:  Constitutional: Well appearing, NAD  HEENT: Atraumatic, ENRIQUETA, Normal, No congestion  Respiratory: Breath Sounds normal b/l, no rales/rhonchi/wheeze  Cardiovascular: Normal S1S2; No murmurs, gallops, or rubs.  Gastrointestinal: Nondistended. Abdomen soft, non tender, Bowel sounds present in all 4 quadrants.  Extremities: Erythema and swelling of medial side of 1st metatarsal of right toe with surrounding black eschar. Lysis of nail of 1st metatarsal, tender to palpation. No edema of lower extremities.   Neurological: AAO x 3, no gross focal motor deficits  Back: No CVA tenderness   Musculoskeletal: non tender  Breasts: Deferred  Genitourinary: deferred  Rectal: Deferred    LABS: All Labs Reviewed:               13.0   11.4  )-----------( 117      ( 21 Apr 2017 05:43 )             38.0     04-21    138  |  104  |  30<H>  ----------------------------<  159<H>  4.3   |  25  |  1.83<H>    Ca    9.4      21 Apr 2017 05:43    TPro  6.7  /  Alb  3.6  /  TBili  0.8  /  DBili  x   /  AST  18  /  ALT  32  /  AlkPhos  58  04-21    LIVER FUNCTIONS - ( 21 Apr 2017 05:43 )  Alb: 3.6 g/dL / Pro: 6.7 gm/dL / ALK PHOS: 58 U/L / ALT: 32 U/L / AST: 18 U/L / GGT: x           PT/INR - ( 22 Apr 2017 06:31 )   PT: 28.9 sec;   INR: 2.62 ratio         PTT - ( 22 Apr 2017 06:31 )  PTT:41.4 sec    Lactate, Blood: 1.6 mmol/L (04-21 @ 00:01)    CAPILLARY BLOOD GLUCOSE  214 (22 Apr 2017 11:53)  177 (22 Apr 2017 08:26)  177 (21 Apr 2017 21:16)  186 (21 Apr 2017 16:40)  193 (21 Apr 2017 12:16)      Blood Culture: Preliminary results: no growth to date    RADIOLOGY/EKG:  EXAM:  FOOT-RIGHT                            PROCEDURE DATE:  04/20/2017        INTERPRETATION:  Right foot    Indication: Back toe infection and swelling, rule out osteomyelitis    IMPRESSION: 3 views demonstrate superficial ulceration at the medial   aspect of the first distal phalanx. No underlying ostial lysis or soft   tissue gas, no visible fracture or dislocation. Vascular calcifications   are noted.      EXAM:  CHEST SINGLE VIEW FRONTAL                            PROCEDURE DATE:  04/20/2017        INTERPRETATION:    INDICATION: History of diabetes great artery disease.    Single frontal view of chest dated 4/20/2017 9:16 PM.  Prior study of   1/26/2017.    FINDINGS /   IMPRESSION:     There is no acute consolidation.  There are no pleural effusion. Patient   is status post sternotomy. There is cardiomegaly. There is dual-lead   pacemaker.    There are degenerative changes.

## 2017-04-23 RX ORDER — LACTOBACILLUS ACIDOPHILUS 100MM CELL
1 CAPSULE ORAL DAILY
Qty: 0 | Refills: 0 | Status: DISCONTINUED | OUTPATIENT
Start: 2017-04-23 | End: 2017-04-27

## 2017-04-23 RX ADMIN — Medication 150 MILLIGRAM(S): at 16:42

## 2017-04-23 RX ADMIN — MEXILETINE HYDROCHLORIDE 150 MILLIGRAM(S): 150 CAPSULE ORAL at 05:29

## 2017-04-23 RX ADMIN — CARVEDILOL PHOSPHATE 25 MILLIGRAM(S): 80 CAPSULE, EXTENDED RELEASE ORAL at 16:52

## 2017-04-23 RX ADMIN — Medication 100 MILLIGRAM(S): at 16:43

## 2017-04-23 RX ADMIN — Medication 81 MILLIGRAM(S): at 21:48

## 2017-04-23 RX ADMIN — PIPERACILLIN AND TAZOBACTAM 25 GRAM(S): 4; .5 INJECTION, POWDER, LYOPHILIZED, FOR SOLUTION INTRAVENOUS at 21:49

## 2017-04-23 RX ADMIN — DORZOLAMIDE HYDROCHLORIDE TIMOLOL MALEATE 1 DROP(S): 20; 5 SOLUTION/ DROPS OPHTHALMIC at 05:28

## 2017-04-23 RX ADMIN — PIPERACILLIN AND TAZOBACTAM 25 GRAM(S): 4; .5 INJECTION, POWDER, LYOPHILIZED, FOR SOLUTION INTRAVENOUS at 05:29

## 2017-04-23 RX ADMIN — DORZOLAMIDE HYDROCHLORIDE TIMOLOL MALEATE 1 DROP(S): 20; 5 SOLUTION/ DROPS OPHTHALMIC at 16:43

## 2017-04-23 RX ADMIN — Medication 0.12 MILLIGRAM(S): at 21:48

## 2017-04-23 RX ADMIN — LATANOPROST 1 DROP(S): 0.05 SOLUTION/ DROPS OPHTHALMIC; TOPICAL at 21:49

## 2017-04-23 RX ADMIN — LISINOPRIL 5 MILLIGRAM(S): 2.5 TABLET ORAL at 21:49

## 2017-04-23 RX ADMIN — ATORVASTATIN CALCIUM 5 MILLIGRAM(S): 80 TABLET, FILM COATED ORAL at 21:48

## 2017-04-23 RX ADMIN — PIPERACILLIN AND TAZOBACTAM 25 GRAM(S): 4; .5 INJECTION, POWDER, LYOPHILIZED, FOR SOLUTION INTRAVENOUS at 12:44

## 2017-04-23 RX ADMIN — CARVEDILOL PHOSPHATE 25 MILLIGRAM(S): 80 CAPSULE, EXTENDED RELEASE ORAL at 05:29

## 2017-04-23 RX ADMIN — Medication 1 TABLET(S): at 16:52

## 2017-04-23 RX ADMIN — Medication 1: at 17:37

## 2017-04-23 RX ADMIN — PANTOPRAZOLE SODIUM 40 MILLIGRAM(S): 20 TABLET, DELAYED RELEASE ORAL at 05:30

## 2017-04-23 RX ADMIN — MEXILETINE HYDROCHLORIDE 150 MILLIGRAM(S): 150 CAPSULE ORAL at 16:43

## 2017-04-23 NOTE — PROGRESS NOTE ADULT - ASSESSMENT
Gangrenous R Hallux / o erythema. Discussed with pt tx op PICC v Amp at [facility], [place]. Awaiting bone scan. Will discuss / daughter as well.

## 2017-04-23 NOTE — PROGRESS NOTE ADULT - ASSESSMENT
76 y/o male with a h/o VT s/p AICD, AFib on AC, CAD s/p CABGx2, DM2, gout, glaucoma,  HLD, chronic systolic  CHF, PVD, CKD 2-3,hx of vasectomy,S/p R femoral endarterectomy 2/6//2017 came to the ED on 4/20/17 after being sent from Dr. Gamble's office for IV abx due to pain, necrosis, erythema of infected first metatarsal of right foot. Pt states the wound began approximatley 3 months ago and there has been a clear fluid drainage. Pt states he has a hx of recurrent infection in this toe. States he had bypass in [place] with Dr. Royal 2 months ago which temporarily helped symptoms but symptoms have been worsening recently. Denies fever, chills, nausea, vomiting, SOB, chest pain, headache, abdominal pain.  1. Right first toe cellulitis with area of gangrene  - follow up cultures   - elevate legs   - day # 3 zosyn and vancomycin  - tolerating antibiotics without rashes or side effects   - check vancomycin trough prior to fourth dose   -podiatry eval in progress, to have bone scan and probably eventual surgery  2. other issues: VT s/p AICD, AFib on AC, CAD s/p CABGx2, DM2, gout, glaucoma,  HLD, chronic systolic  CHF, PVD, CKD 2-3  - per medicine

## 2017-04-23 NOTE — PROGRESS NOTE ADULT - PROBLEM SELECTOR PLAN 1
- Cellulitis of right hallux with gangrene  - continue vanco and zosyn as per ID input   - Blood cx neg to date  - Podiatry, ID and Vascular consults appreciated and plan is for tagged WBC scan on monday with plan for possible surgery tues/wed based on NM scan results as per dr james

## 2017-04-23 NOTE — PROGRESS NOTE ADULT - SUBJECTIVE AND OBJECTIVE BOX
Patient is a 77y old  Male who presents with a chief complaint of infected Right  great toe x 3 months (20 Apr 2017 22:41)      HPI:  77 y.o. male with PMH VT s/p AICD, AFib on AC, CAD s/p CABGx2, DM2, gout, glaucoma,  HLD, chronic systolic  CHF, PVD, CKD 2-3,hx of vasectomy  ,S/p  R femoral endarterectomy feb /6/2017   presents to ED sent from Dr. Gamble's office for IV abx c/o pain, necrosis, erythema of infected first metatarsal on right foot. +Drainage. Has hx of recurrent infection in this toe. Had bypass in [facility] with Dr. Wesley which temporarily helped sx but sx have been worsening recently. States usually pulse is only detected with doppler.  Denies fever. NKDA.  Patient was seen in ED by podiatry dr Gamble Blood cx were drawn and zosyn and vanco admisnitered in ED EKG- NSR,nonspecific intraventricular block,no significant  ST/T chnages as compared to EKG done aug/2016  ROS- denies fever ,chills,chest pain ,SOB,headache,palpitations,abdominal pain,dysuria,flank pain ,vomiting,nausea ,diarrhea PMH: as above PSH:  CABG, AICD placement, vasectomy, R femoral endarterectomy  Allergy:   NKDA SH:  ex smoker, quit about 30 years ago, denies drinking alcohol (20 Apr 2017 21:57)      Allergies    No Known Allergies    Intolerances        MEDICATIONS  (STANDING):  aspirin enteric coated 81milliGRAM(s) Oral at bedtime  lisinopril 5milliGRAM(s) Oral at bedtime  digoxin     Tablet 0.125milliGRAM(s) Oral at bedtime  mexiletine 150milliGRAM(s) Oral every 12 hours  allopurinol 100milliGRAM(s) Oral after dinner  carvedilol 25milliGRAM(s) Oral every 12 hours  dorzolamide 2%/timolol 0.5% Ophthalmic Solution 1Drop(s) Left EYE two times a day  latanoprost 0.005% Ophthalmic Solution 1Drop(s) Left EYE at bedtime  pantoprazole    Tablet 40milliGRAM(s) Oral before breakfast  atorvastatin 5milliGRAM(s) Oral at bedtime  piperacillin/tazobactam IVPB. 3.375Gram(s) IV Intermittent every 8 hours  insulin lispro (HumaLOG) corrective regimen sliding scale  SubCutaneous three times a day before meals  insulin lispro (HumaLOG) corrective regimen sliding scale  SubCutaneous at bedtime  dextrose 5%. 1000milliLiter(s) IV Continuous <Continuous>  dextrose 50% Injectable 12.5Gram(s) IV Push once  dextrose 50% Injectable 25Gram(s) IV Push once  dextrose 50% Injectable 25Gram(s) IV Push once  vancomycin  IVPB 750milliGRAM(s) IV Intermittent every 24 hours  lactobacillus acidophilus 1Tablet(s) Oral daily    MEDICATIONS  (PRN):  dextrose Gel 1Dose(s) Oral once PRN Blood Glucose LESS THAN 70 milliGRAM(s)/deciliter  glucagon  Injectable 1milliGRAM(s) IntraMuscular once PRN Glucose LESS THAN 70 milligrams/deciliter        RADIOLOGY

## 2017-04-23 NOTE — PROGRESS NOTE ADULT - SUBJECTIVE AND OBJECTIVE BOX
78 y/o male with a h/o VT s/p AICD, AFib on AC, CAD s/p CABGx2, DM2, gout, glaucoma,  HLD, chronic systolic  CHF, PVD, CKD 2-3,hx of vasectomy,S/p R femoral endarterectomy 2/6//2017 came to the ED on 4/20/17 after being sent from Dr. Gamble's office for IV abx due to pain, necrosis, erythema of infected first metatarsal of right foot. Pt states the wound began approximatley 3 months ago and there has been a clear fluid drainage. Pt states he has a hx of recurrent infection in this toe. States he had bypass in [place] with Dr. Royal 2 months ago which temporarily helped symptoms but symptoms have been worsening recently. Denies fever, chills, nausea, vomiting, SOB, chest pain, headache, abdominal pain.  Today 4/22 patient ambulating  Today 4/23 patient without complaints      MEDICATIONS  (STANDING):  aspirin enteric coated 81milliGRAM(s) Oral at bedtime  lisinopril 5milliGRAM(s) Oral at bedtime  digoxin     Tablet 0.125milliGRAM(s) Oral at bedtime  mexiletine 150milliGRAM(s) Oral every 12 hours  allopurinol 100milliGRAM(s) Oral after dinner  carvedilol 25milliGRAM(s) Oral every 12 hours  dorzolamide 2%/timolol 0.5% Ophthalmic Solution 1Drop(s) Left EYE two times a day  latanoprost 0.005% Ophthalmic Solution 1Drop(s) Left EYE at bedtime  pantoprazole    Tablet 40milliGRAM(s) Oral before breakfast  atorvastatin 5milliGRAM(s) Oral at bedtime  piperacillin/tazobactam IVPB. 3.375Gram(s) IV Intermittent every 8 hours  insulin lispro (HumaLOG) corrective regimen sliding scale  SubCutaneous three times a day before meals  insulin lispro (HumaLOG) corrective regimen sliding scale  SubCutaneous at bedtime  dextrose 5%. 1000milliLiter(s) IV Continuous <Continuous>  dextrose 50% Injectable 12.5Gram(s) IV Push once  dextrose 50% Injectable 25Gram(s) IV Push once  dextrose 50% Injectable 25Gram(s) IV Push once  vancomycin  IVPB 750milliGRAM(s) IV Intermittent every 24 hours    MEDICATIONS  (PRN):  dextrose Gel 1Dose(s) Oral once PRN Blood Glucose LESS THAN 70 milliGRAM(s)/deciliter  glucagon  Injectable 1milliGRAM(s) IntraMuscular once PRN Glucose LESS THAN 70 milligrams/deciliter      Vital Signs Last 24 Hrs  T(C): 36.4, Max: 36.5 (04-22 @ 21:09)  T(F): 97.6, Max: 97.7 (04-22 @ 21:09)  HR: 69 (69 - 74)  BP: 136/64 (106/72 - 136/64)  BP(mean): --  RR: 16 (16 - 18)  SpO2: 98% (98% - 98%)    Physical Exam:            PHYSICAL EXAM:  Constitutional: Well appearing,NAD  HEENT: Atraumatic, ENRIQUETA, Normal, No congestion  Respiratory: Breath Sounds normal, no rhonchi/wheeze  Cardiovascular: N S1S2; DANTE present  Gastrointestinal: Abdomen soft, non tender, Bowel sounds present  Extremities: No B/l lower extremity and pedal edema, except small area of erythema and swelling on great right toe surrounding black eschar. Right great toe lysis of nail with dorsal black eschar,with surrounding 1st metatarsal  skin erythema with mild swelling and tenderness, decreased sensation to tight touch  Neurological: AAO x 3, no gross focal motor deficits    Back: No CVA tenderness   Musculoskeletal: non tender  Breasts: Deferred  Genitourinary: deferred  Rectal: Deferred    LABS: All Labs Reviewed:                        13.0   11.4  )-----------( 117      ( 21 Apr 2017 05:43 )             38.0     04-21    138  |  104  |  30<H>  ----------------------------<  159<H>  4.3   |  25  |  1.83<H>    Ca    9.4      21 Apr 2017 05:43    TPro  6.7  /  Alb  3.6  /  TBili  0.8  /  DBili  x   /  AST  18  /  ALT  32  /  AlkPhos  58  04-21    PT/INR - ( 21 Apr 2017 05:43 )   PT: 29.5 sec;   INR: 2.68 ratio         PTT - ( 21 Apr 2017 05:43 )  PTT:41.9 sec      Blood Culture: Pending     RADIOLOGY/EKG:  EXAM:  FOOT-RIGHT                            PROCEDURE DATE:  04/20/2017        INTERPRETATION:  Right foot    Indication: Back toe infection and swelling, rule out osteomyelitis    IMPRESSION: 3 views demonstrate superficial ulceration at the medial   aspect of the first distal phalanx. No underlying ostial lysis or soft   tissue gas, no visible fracture or dislocation. Vascular calcifications   are noted.      EXAM:  CHEST SINGLE VIEW FRONTAL                            PROCEDURE DATE:  04/20/2017        INTERPRETATION:    INDICATION: History of diabetes great artery disease.    Single frontal view of chest dated 4/20/2017 9:16 PM.  Prior study of   1/26/2017.    FINDINGS /   IMPRESSION:     There is no acute consolidation.  There are no pleural effusion. Patient   is status post sternotomy. There is cardiomegaly. There is dual-lead   pacemaker.    There are degenerative changes.       Code status: Full resuscitation

## 2017-04-23 NOTE — PROGRESS NOTE ADULT - PROBLEM SELECTOR PLAN 2
- Hx of afib.  - Continue digoxin, and carvedilol.  - will hold coumadin in anticipation of surgery and let it drift down

## 2017-04-24 LAB
INR BLD: 1.9 RATIO — HIGH (ref 0.88–1.16)
PROTHROM AB SERPL-ACNC: 20.8 SEC — HIGH (ref 9.8–12.7)

## 2017-04-24 RX ORDER — HEPARIN SODIUM 5000 [USP'U]/ML
5000 INJECTION INTRAVENOUS; SUBCUTANEOUS EVERY 12 HOURS
Qty: 0 | Refills: 0 | Status: DISCONTINUED | OUTPATIENT
Start: 2017-04-24 | End: 2017-04-24

## 2017-04-24 RX ORDER — ENOXAPARIN SODIUM 100 MG/ML
40 INJECTION SUBCUTANEOUS EVERY 24 HOURS
Qty: 0 | Refills: 0 | Status: DISCONTINUED | OUTPATIENT
Start: 2017-04-25 | End: 2017-04-25

## 2017-04-24 RX ADMIN — CARVEDILOL PHOSPHATE 25 MILLIGRAM(S): 80 CAPSULE, EXTENDED RELEASE ORAL at 17:22

## 2017-04-24 RX ADMIN — MEXILETINE HYDROCHLORIDE 150 MILLIGRAM(S): 150 CAPSULE ORAL at 17:22

## 2017-04-24 RX ADMIN — ATORVASTATIN CALCIUM 5 MILLIGRAM(S): 80 TABLET, FILM COATED ORAL at 21:52

## 2017-04-24 RX ADMIN — PIPERACILLIN AND TAZOBACTAM 25 GRAM(S): 4; .5 INJECTION, POWDER, LYOPHILIZED, FOR SOLUTION INTRAVENOUS at 15:23

## 2017-04-24 RX ADMIN — ENOXAPARIN SODIUM 40 MILLIGRAM(S): 100 INJECTION SUBCUTANEOUS at 23:54

## 2017-04-24 RX ADMIN — Medication 0.12 MILLIGRAM(S): at 21:55

## 2017-04-24 RX ADMIN — Medication 100 MILLIGRAM(S): at 17:29

## 2017-04-24 RX ADMIN — CARVEDILOL PHOSPHATE 25 MILLIGRAM(S): 80 CAPSULE, EXTENDED RELEASE ORAL at 05:33

## 2017-04-24 RX ADMIN — LISINOPRIL 5 MILLIGRAM(S): 2.5 TABLET ORAL at 21:54

## 2017-04-24 RX ADMIN — MEXILETINE HYDROCHLORIDE 150 MILLIGRAM(S): 150 CAPSULE ORAL at 05:33

## 2017-04-24 RX ADMIN — LATANOPROST 1 DROP(S): 0.05 SOLUTION/ DROPS OPHTHALMIC; TOPICAL at 21:58

## 2017-04-24 RX ADMIN — DORZOLAMIDE HYDROCHLORIDE TIMOLOL MALEATE 1 DROP(S): 20; 5 SOLUTION/ DROPS OPHTHALMIC at 17:27

## 2017-04-24 RX ADMIN — PANTOPRAZOLE SODIUM 40 MILLIGRAM(S): 20 TABLET, DELAYED RELEASE ORAL at 11:05

## 2017-04-24 RX ADMIN — Medication 81 MILLIGRAM(S): at 21:52

## 2017-04-24 RX ADMIN — Medication 1 TABLET(S): at 11:07

## 2017-04-24 RX ADMIN — DORZOLAMIDE HYDROCHLORIDE TIMOLOL MALEATE 1 DROP(S): 20; 5 SOLUTION/ DROPS OPHTHALMIC at 05:33

## 2017-04-24 RX ADMIN — Medication 150 MILLIGRAM(S): at 19:39

## 2017-04-24 RX ADMIN — PIPERACILLIN AND TAZOBACTAM 25 GRAM(S): 4; .5 INJECTION, POWDER, LYOPHILIZED, FOR SOLUTION INTRAVENOUS at 21:45

## 2017-04-24 RX ADMIN — PIPERACILLIN AND TAZOBACTAM 25 GRAM(S): 4; .5 INJECTION, POWDER, LYOPHILIZED, FOR SOLUTION INTRAVENOUS at 05:34

## 2017-04-24 NOTE — PROGRESS NOTE ADULT - PROBLEM SELECTOR PLAN 1
- Cellulitis of right hallux with gangrene  - continue vanco and zosyn day 4  - Blood cx neg to date  - Podiatry, ID and Vascular consults appreciated and plan is for tagged WBC scan on monday with plan for possible surgery tues/wed based on NM scan results as per dr james

## 2017-04-24 NOTE — PROGRESS NOTE ADULT - PROBLEM SELECTOR PLAN 2
- Hx of afib.  - Continue digoxin, and carvedilol.  - will hold coumadin in anticipation of surgery and let it drift down with lovenox for dvt prohpylaxis once INR < 2

## 2017-04-24 NOTE — PROGRESS NOTE ADULT - ASSESSMENT
Await bone scan Gangrene / mild erythema to 1st MTP to Hallux. No drainage. Spoke with daughter discuss with her Tx options. Will F/U Thanks

## 2017-04-24 NOTE — PROGRESS NOTE ADULT - PROBLEM SELECTOR PLAN 3
-   - Hold quinapril and lasix for tonight  - Hold IVF for now d/t hx of chronic systolic HF
- Hold quinapril and lasix for tonight  - Hold IVF for now d/t hx of chronic systolic HF
- Hold quinapril and lasix for tonight  - Hold IVF for now d/t hx of chronic systolic HF
- BUN/ Cr ratio 16.4. Possible prerenal KELLI  - Repeat BMP in am  - Hold quinapril and lasix for tonight  - Hold IVF for now d/t hx of chronic systolic HF

## 2017-04-24 NOTE — PROGRESS NOTE ADULT - SUBJECTIVE AND OBJECTIVE BOX
HPI: 76 y/o male with a h/o VT s/p AICD, AFib on AC, CAD s/p CABGx2, DM2, gout, glaucoma,  HLD, chronic systolic CHF, PVD, CKD 2-3,hx of vasectomy, S/p R femoral endarterectomy on 2/6/2017, came to the ED on 4/20/17 after being sent from Dr. Gamble's office for IV abx due to pain, necrosis, erythema of infected first metatarsal of right foot. Pt states the wound began approximately 3 months ago, and there has been a clear fluid drainage. Pt states he has a hx of recurrent infection in this toe. Had bypass in [place] with Dr. Royal 2 months ago which temporarily helped symptoms but symptoms have been worsening recently.     4/22: Pt seen and examined. No new complaints.   4/23 - feels well. no cp, sob.    4/24 - feels well. ambulating well.     MEDICATIONS  (STANDING):  aspirin enteric coated 81milliGRAM(s) Oral at bedtime  lisinopril 5milliGRAM(s) Oral at bedtime  digoxin     Tablet 0.125milliGRAM(s) Oral at bedtime  mexiletine 150milliGRAM(s) Oral every 12 hours  warfarin 2.5milliGRAM(s) Oral <User Schedule>  warfarin 5milliGRAM(s) Oral <User Schedule>  allopurinol 100milliGRAM(s) Oral after dinner  carvedilol 25milliGRAM(s) Oral every 12 hours  dorzolamide 2%/timolol 0.5% Ophthalmic Solution 1Drop(s) Left EYE two times a day  latanoprost 0.005% Ophthalmic Solution 1Drop(s) Left EYE at bedtime  pantoprazole    Tablet 40milliGRAM(s) Oral before breakfast  atorvastatin 5milliGRAM(s) Oral at bedtime  piperacillin/tazobactam IVPB. 3.375Gram(s) IV Intermittent every 8 hours  insulin lispro (HumaLOG) corrective regimen sliding scale  SubCutaneous three times a day before meals  insulin lispro (HumaLOG) corrective regimen sliding scale  SubCutaneous at bedtime  dextrose 5%. 1000milliLiter(s) IV Continuous <Continuous>  dextrose 50% Injectable 12.5Gram(s) IV Push once  dextrose 50% Injectable 25Gram(s) IV Push once  dextrose 50% Injectable 25Gram(s) IV Push once    MEDICATIONS  (PRN):  dextrose Gel 1Dose(s) Oral once PRN Blood Glucose LESS THAN 70 milliGRAM(s)/deciliter  glucagon  Injectable 1milliGRAM(s) IntraMuscular once PRN Glucose LESS THAN 70 milligrams/deciliter    REVIEW OF SYSTEMS: as per HPI other wise negative     ICU Vital Signs Last 24 Hrs  T(C): 37, Max: 37 (04-24 @ 11:48)  T(F): 98.6, Max: 98.6 (04-24 @ 11:48)  HR: 70 (69 - 71)  BP: 118/52 (118/52 - 143/61)  BP(mean): --  ABP: --  ABP(mean): --  RR: 14 (14 - 16)  SpO2: 99% (98% - 99%)      PHYSICAL EXAM:  Constitutional: Well appearing, NAD  HEENT: Atraumatic, ENRIQUETA, Normal, No congestion  Respiratory: Breath Sounds normal b/l, no rales/rhonchi/wheeze  Cardiovascular: Normal S1S2; No murmurs, gallops, or rubs.  Gastrointestinal: Nondistended. Abdomen soft, non tender, Bowel sounds present in all 4 quadrants.  Extremities: Erythema and swelling of medial side of 1st metatarsal of right toe with surrounding black eschar. Lysis of nail of 1st metatarsal, tender to palpation. No edema of lower extremities.   Neurological: AAO x 3, no gross focal motor deficits  Back: No CVA tenderness   Musculoskeletal: non tender  Breasts: Deferred  Genitourinary: deferred  Rectal: Deferred              PT/INR - ( 24 Apr 2017 06:55 )   PT: 20.8 sec;   INR: 1.90 ratio                   Blood Culture: Preliminary results: no growth to date    RADIOLOGY/EKG:  EXAM:  FOOT-RIGHT                            PROCEDURE DATE:  04/20/2017        INTERPRETATION:  Right foot    Indication: Back toe infection and swelling, rule out osteomyelitis    IMPRESSION: 3 views demonstrate superficial ulceration at the medial   aspect of the first distal phalanx. No underlying ostial lysis or soft   tissue gas, no visible fracture or dislocation. Vascular calcifications   are noted.      EXAM:  CHEST SINGLE VIEW FRONTAL                            PROCEDURE DATE:  04/20/2017        INTERPRETATION:    INDICATION: History of diabetes great artery disease.    Single frontal view of chest dated 4/20/2017 9:16 PM.  Prior study of   1/26/2017.    FINDINGS /   IMPRESSION:     There is no acute consolidation.  There are no pleural effusion. Patient   is status post sternotomy. There is cardiomegaly. There is dual-lead   pacemaker.    There are degenerative changes.

## 2017-04-24 NOTE — PROGRESS NOTE ADULT - ASSESSMENT
78 y/o male with a h/o VT s/p AICD, AFib on AC, CAD s/p CABGx2, DM2, gout, glaucoma,  HLD, chronic systolic  CHF, PVD, CKD 2-3,hx of vasectomy,S/p R femoral endarterectomy 2/6//2017 came to the ED on 4/20/17 after being sent from Dr. Gamble's office for IV abx due to pain, necrosis, erythema of infected first metatarsal of right foot. Pt states the wound began approximatley 3 months ago and there has been a clear fluid drainage. Pt states he has a hx of recurrent infection in this toe. States he had bypass in [place] with Dr. Royal 2 months ago which temporarily helped symptoms but symptoms have been worsening recently. Denies fever, chills, nausea, vomiting, SOB, chest pain, headache, abdominal pain.  1. Right first toe cellulitis with area of gangrene  - follow up cultures   - elevate legs   - day # 4 zosyn and vancomycin  - tolerating antibiotics without rashes or side effects   - check vancomycin trough prior to fourth dose   -podiatry eval in progress, to have bone scan and probably eventual surgery  2. other issues: VT s/p AICD, AFib on AC, CAD s/p CABGx2, DM2, gout, glaucoma,  HLD, chronic systolic  CHF, PVD, CKD 2-3  - per medicine

## 2017-04-24 NOTE — PROGRESS NOTE ADULT - SUBJECTIVE AND OBJECTIVE BOX
76 y/o male with a h/o VT s/p AICD, AFib on AC, CAD s/p CABGx2, DM2, gout, glaucoma,  HLD, chronic systolic  CHF, PVD, CKD 2-3,hx of vasectomy,S/p R femoral endarterectomy 2/6//2017 came to the ED on 4/20/17 after being sent from Dr. Gamble's office for IV abx due to pain, necrosis, erythema of infected first metatarsal of right foot. Pt states the wound began approximatley 3 months ago and there has been a clear fluid drainage. Pt states he has a hx of recurrent infection in this toe. States he had bypass in [facility] with Dr. Royal 2 months ago which temporarily helped symptoms but symptoms have been worsening recently. Denies fever, chills, nausea, vomiting, SOB, chest pain, headache, abdominal pain.  Today 4/22 patient ambulating  Today 4/23 patient without complaints  Today 4/24 patient had start of nuclear scan study      MEDICATIONS  (STANDING):  aspirin enteric coated 81milliGRAM(s) Oral at bedtime  lisinopril 5milliGRAM(s) Oral at bedtime  digoxin     Tablet 0.125milliGRAM(s) Oral at bedtime  mexiletine 150milliGRAM(s) Oral every 12 hours  allopurinol 100milliGRAM(s) Oral after dinner  carvedilol 25milliGRAM(s) Oral every 12 hours  dorzolamide 2%/timolol 0.5% Ophthalmic Solution 1Drop(s) Left EYE two times a day  latanoprost 0.005% Ophthalmic Solution 1Drop(s) Left EYE at bedtime  pantoprazole    Tablet 40milliGRAM(s) Oral before breakfast  atorvastatin 5milliGRAM(s) Oral at bedtime  piperacillin/tazobactam IVPB. 3.375Gram(s) IV Intermittent every 8 hours  insulin lispro (HumaLOG) corrective regimen sliding scale  SubCutaneous three times a day before meals  insulin lispro (HumaLOG) corrective regimen sliding scale  SubCutaneous at bedtime  dextrose 5%. 1000milliLiter(s) IV Continuous <Continuous>  dextrose 50% Injectable 12.5Gram(s) IV Push once  dextrose 50% Injectable 25Gram(s) IV Push once  dextrose 50% Injectable 25Gram(s) IV Push once  vancomycin  IVPB 750milliGRAM(s) IV Intermittent every 24 hours  lactobacillus acidophilus 1Tablet(s) Oral daily    MEDICATIONS  (PRN):  dextrose Gel 1Dose(s) Oral once PRN Blood Glucose LESS THAN 70 milliGRAM(s)/deciliter  glucagon  Injectable 1milliGRAM(s) IntraMuscular once PRN Glucose LESS THAN 70 milligrams/deciliter      Vital Signs Last 24 Hrs  T(C): 37, Max: 37 (04-24 @ 11:48)  T(F): 98.6, Max: 98.6 (04-24 @ 11:48)  HR: 70 (69 - 71)  BP: 118/52 (118/52 - 143/61)  BP(mean): --  RR: 14 (14 - 16)  SpO2: 99% (98% - 99%)    Physical Exam:        Physical Exam:            PHYSICAL EXAM:  Constitutional: Well appearing,NAD  HEENT: Atraumatic, ENRIQUETA, Normal, No congestion  Respiratory: Breath Sounds normal, no rhonchi/wheeze  Cardiovascular: N S1S2; DANTE present  Gastrointestinal: Abdomen soft, non tender, Bowel sounds present  Extremities: No B/l lower extremity and pedal edema, except small area of erythema and swelling on great right toe surrounding black eschar. Right great toe lysis of nail with dorsal black eschar,with surrounding 1st metatarsal  skin erythema with mild swelling and tenderness, decreased sensation to tight touch  Neurological: AAO x 3, no gross focal motor deficits    Back: No CVA tenderness   Musculoskeletal: non tender  Breasts: Deferred  Genitourinary: deferred  Rectal: Deferred    LABS: All Labs Reviewed:                        13.0   11.4  )-----------( 117      ( 21 Apr 2017 05:43 )             38.0     04-21    138  |  104  |  30<H>  ----------------------------<  159<H>  4.3   |  25  |  1.83<H>    Ca    9.4      21 Apr 2017 05:43    TPro  6.7  /  Alb  3.6  /  TBili  0.8  /  DBili  x   /  AST  18  /  ALT  32  /  AlkPhos  58  04-21    PT/INR - ( 21 Apr 2017 05:43 )   PT: 29.5 sec;   INR: 2.68 ratio         PTT - ( 21 Apr 2017 05:43 )  PTT:41.9 sec      Blood Culture: Pending     RADIOLOGY/EKG:  EXAM:  FOOT-RIGHT                            PROCEDURE DATE:  04/20/2017        INTERPRETATION:  Right foot    Indication: Back toe infection and swelling, rule out osteomyelitis    IMPRESSION: 3 views demonstrate superficial ulceration at the medial   aspect of the first distal phalanx. No underlying ostial lysis or soft   tissue gas, no visible fracture or dislocation. Vascular calcifications   are noted.      EXAM:  CHEST SINGLE VIEW FRONTAL                            PROCEDURE DATE:  04/20/2017        INTERPRETATION:    INDICATION: History of diabetes great artery disease.    Single frontal view of chest dated 4/20/2017 9:16 PM.  Prior study of   1/26/2017.    FINDINGS /   IMPRESSION:     There is no acute consolidation.  There are no pleural effusion. Patient   is status post sternotomy. There is cardiomegaly. There is dual-lead   pacemaker.    There are degenerative changes.       Code status: Full resuscitation

## 2017-04-24 NOTE — PROVIDER CONTACT NOTE (MEDICATION) - ACTION/TREATMENT ORDERED:
Received orders from DEANGELO Samano to start Lovenox at midnight on 4/25/2017 and to inform day nurse medication was administered.

## 2017-04-24 NOTE — PROGRESS NOTE ADULT - SUBJECTIVE AND OBJECTIVE BOX
Patient is a 77y old  Male who presents with a chief complaint of infected Right  great toe x 3 months (20 Apr 2017 22:41)      HPI:  77 y.o. male with PMH VT s/p AICD, AFib on AC, CAD s/p CABGx2, DM2, gout, glaucoma,  HLD, chronic systolic  CHF, PVD, CKD 2-3,hx of vasectomy  ,S/p  R femoral endarterectomy feb /6/2017   presents to ED sent from Dr. Gamble's office for IV abx c/o pain, necrosis, erythema of infected first metatarsal on right foot. +Drainage. Has hx of recurrent infection in this toe. Had bypass in [facility] with Dr. Wesley which temporarily helped sx but sx have been worsening recently. States usually pulse is only detected with doppler.  Denies fever. NKDA.  Patient was seen in ED by podiatry dr Gamble Blood cx were drawn and zosyn and vanco admisnitered in ED EKG- NSR,nonspecific intraventricular block,no significant  ST/T chnages as compared to EKG done aug/2016  ROS- denies fever ,chills,chest pain ,SOB,headache,palpitations,abdominal pain,dysuria,flank pain ,vomiting,nausea ,diarrhea PMH: as above PSH:  CABG, AICD placement, vasectomy, R femoral endarterectomy  Allergy:   NKDA SH:  ex smoker, quit about 30 years ago, denies drinking alcohol (20 Apr 2017 21:57)      Allergies    No Known Allergies    Intolerances        MEDICATIONS  (STANDING):  Comprehensive Metabolic Panel (04.21.17 @ 05:43)    Sodium, Serum: 138 mmol/L    Potassium, Serum: 4.3 mmol/L    Chloride, Serum: 104 mmol/L    Carbon Dioxide, Serum: 25 mmol/L    Anion Gap, Serum: 9 mmol/L    Blood Urea Nitrogen, Serum: 30 mg/dL    Creatinine, Serum: 1.83 mg/dL    Glucose, Serum: 159 mg/dL    Calcium, Total Serum: 9.4 mg/dL    Protein Total, Serum: 6.7 gm/dL    Albumin, Serum: 3.6 g/dL    Bilirubin Total, Serum: 0.8 mg/dL    Alkaline Phosphatase, Serum: 58 U/L    Aspartate Aminotransferase (AST/SGOT): 18 U/L    Alanine Aminotransferase (ALT/SGPT): 32 U/L    eGFR if Non : 35: Interpretative comment  The units for eGFR are ml/min/1.73m2 (normalized body surface area). The  eGFR is calculated from a serum creatinine using the CKD-EPI equation.  Other variables required for calculation are race, age and sex. Among  patients w35: ith chronic kidney disease (CKD), the eGFR is useful in  determining the stage of disease according to KDOQI CKD classification.  All eGFR results are reported numerically with the following  interpretation.          GFR                    With35:                  Without     (ml/min/1.73 m2)    Kidney Damage       Kidney Damage        >= 90                    Stage 1                     Normal        60-89                    Stage 2                     Decreased GFR        :      Stage 3                     Stage 3        15-29                    Stage 4                     Stage 4        < 15                      Stage 5                     Stage 5  Each stage of CKD assumes that the associated GFR level has been in  eff35: ect for at least 3 months. Determination of stages one and two (with  eGFR > 59 ml/min/m2) requires estimation of kidney damage for at least 3  months as defined by structural or functional abnormalities.  Limitations: All estimates of GFR will be les35: s accurate for patients at  extremes of muscle mass (including but not limited to frail elderly,  critically ill, or cancer patients), those with unusual diets, and those  with conditions associated with reduced secretion or extrarenal  elimination of35:  creatinine. The eGFR equation is not recommended for use  in patients with unstable creatinine levels. mL/min/1.73M2    eGFR if African American: 40 mL/min/1.73M2    aspirin enteric coated 81milliGRAM(s) Oral at bedtime  lisinopril 5milliGRAM(s) Oral at bedtime  digoxin     Tablet 0.125milliGRAM(s) Oral at bedtime  mexiletine 150milliGRAM(s) Oral every 12 hours  allopurinol 100milliGRAM(s) Oral after dinner  carvedilol 25milliGRAM(s) Oral every 12 hours  dorzolamide 2%/timolol 0.5% Ophthalmic Solution 1Drop(s) Left EYE two times a day  latanoprost 0.005% Ophthalmic Solution 1Drop(s) Left EYE at bedtime  pantoprazole    Tablet 40milliGRAM(s) Oral before breakfast  atorvastatin 5milliGRAM(s) Oral at bedtime  piperacillin/tazobactam IVPB. 3.375Gram(s) IV Intermittent every 8 hours  insulin lispro (HumaLOG) corrective regimen sliding scale  SubCutaneous three times a day before meals  insulin lispro (HumaLOG) corrective regimen sliding scale  SubCutaneous at bedtime  dextrose 5%. 1000milliLiter(s) IV Continuous <Continuous>  dextrose 50% Injectable 12.5Gram(s) IV Push once  dextrose 50% Injectable 25Gram(s) IV Push once  dextrose 50% Injectable 25Gram(s) IV Push once  vancomycin  IVPB 750milliGRAM(s) IV Intermittent every 24 hours  lactobacillus acidophilus 1Tablet(s) Oral daily    MEDICATIONS  (PRN):  dextrose Gel 1Dose(s) Oral once PRN Blood Glucose LESS THAN 70 milliGRAM(s)/deciliter  glucagon  Injectable 1milliGRAM(s) IntraMuscular once PRN Glucose LESS THAN 70 milligrams/deciliter        RADIOLOGY      Complete Blood Count + Automated Diff (05.22.08 @ 05:43)    Neutrophil Count - CBC: 5.3 K/uL    Lymphocyte Count - CBC: 1.3 K/uL    Monocyte Count - CBC: 0.6 K/uL    Eosinophil Count - CBC: 0.3 K/uL    Basophil Count - CBC: 0.0 K/uL

## 2017-04-24 NOTE — PROVIDER CONTACT NOTE (MEDICATION) - SITUATION
Order written for patient to start Lovenox at 4/25/2017.  Medication order written as continues starting from midnight 4/25/2017 on.

## 2017-04-24 NOTE — PROGRESS NOTE ADULT - PROBLEM SELECTOR PLAN 4
- clinically stable  - Hold quinapril and lasix .  - Continue digoxin, carvedilol, and mexeletine.

## 2017-04-24 NOTE — PROGRESS NOTE ADULT - PROBLEM SELECTOR PLAN 5
- Continue current insulin regimen.

## 2017-04-25 LAB
ANION GAP SERPL CALC-SCNC: 10 MMOL/L — SIGNIFICANT CHANGE UP (ref 5–17)
BUN SERPL-MCNC: 33 MG/DL — HIGH (ref 7–23)
CALCIUM SERPL-MCNC: 9.2 MG/DL — SIGNIFICANT CHANGE UP (ref 8.5–10.1)
CHLORIDE SERPL-SCNC: 107 MMOL/L — SIGNIFICANT CHANGE UP (ref 96–108)
CO2 SERPL-SCNC: 24 MMOL/L — SIGNIFICANT CHANGE UP (ref 22–31)
CREAT SERPL-MCNC: 1.86 MG/DL — HIGH (ref 0.5–1.3)
GLUCOSE SERPL-MCNC: 107 MG/DL — HIGH (ref 70–99)
HCT VFR BLD CALC: 37.2 % — LOW (ref 39–50)
HGB BLD-MCNC: 11.9 G/DL — LOW (ref 13–17)
MCHC RBC-ENTMCNC: 28.2 PG — SIGNIFICANT CHANGE UP (ref 27–34)
MCHC RBC-ENTMCNC: 32 GM/DL — SIGNIFICANT CHANGE UP (ref 32–36)
MCV RBC AUTO: 88.2 FL — SIGNIFICANT CHANGE UP (ref 80–100)
PLATELET # BLD AUTO: 126 K/UL — LOW (ref 150–400)
POTASSIUM SERPL-MCNC: 4.1 MMOL/L — SIGNIFICANT CHANGE UP (ref 3.5–5.3)
POTASSIUM SERPL-SCNC: 4.1 MMOL/L — SIGNIFICANT CHANGE UP (ref 3.5–5.3)
RBC # BLD: 4.22 M/UL — SIGNIFICANT CHANGE UP (ref 4.2–5.8)
RBC # FLD: 15.3 % — HIGH (ref 10.3–14.5)
SODIUM SERPL-SCNC: 141 MMOL/L — SIGNIFICANT CHANGE UP (ref 135–145)
VANCOMYCIN TROUGH SERPL-MCNC: 8 UG/ML — LOW (ref 10–20)
WBC # BLD: 9.5 K/UL — SIGNIFICANT CHANGE UP (ref 3.8–10.5)
WBC # FLD AUTO: 9.5 K/UL — SIGNIFICANT CHANGE UP (ref 3.8–10.5)

## 2017-04-25 PROCEDURE — 78805: CPT | Mod: 26

## 2017-04-25 RX ORDER — HEPARIN SODIUM 5000 [USP'U]/ML
5000 INJECTION INTRAVENOUS; SUBCUTANEOUS EVERY 12 HOURS
Qty: 0 | Refills: 0 | Status: DISCONTINUED | OUTPATIENT
Start: 2017-04-25 | End: 2017-04-27

## 2017-04-25 RX ADMIN — CARVEDILOL PHOSPHATE 25 MILLIGRAM(S): 80 CAPSULE, EXTENDED RELEASE ORAL at 07:51

## 2017-04-25 RX ADMIN — PIPERACILLIN AND TAZOBACTAM 25 GRAM(S): 4; .5 INJECTION, POWDER, LYOPHILIZED, FOR SOLUTION INTRAVENOUS at 14:44

## 2017-04-25 RX ADMIN — DORZOLAMIDE HYDROCHLORIDE TIMOLOL MALEATE 1 DROP(S): 20; 5 SOLUTION/ DROPS OPHTHALMIC at 07:51

## 2017-04-25 RX ADMIN — Medication 81 MILLIGRAM(S): at 21:25

## 2017-04-25 RX ADMIN — HEPARIN SODIUM 5000 UNIT(S): 5000 INJECTION INTRAVENOUS; SUBCUTANEOUS at 18:13

## 2017-04-25 RX ADMIN — PIPERACILLIN AND TAZOBACTAM 25 GRAM(S): 4; .5 INJECTION, POWDER, LYOPHILIZED, FOR SOLUTION INTRAVENOUS at 09:32

## 2017-04-25 RX ADMIN — Medication 1: at 13:27

## 2017-04-25 RX ADMIN — Medication 100 MILLIGRAM(S): at 18:14

## 2017-04-25 RX ADMIN — LATANOPROST 1 DROP(S): 0.05 SOLUTION/ DROPS OPHTHALMIC; TOPICAL at 21:25

## 2017-04-25 RX ADMIN — ATORVASTATIN CALCIUM 5 MILLIGRAM(S): 80 TABLET, FILM COATED ORAL at 21:25

## 2017-04-25 RX ADMIN — CARVEDILOL PHOSPHATE 25 MILLIGRAM(S): 80 CAPSULE, EXTENDED RELEASE ORAL at 18:14

## 2017-04-25 RX ADMIN — MEXILETINE HYDROCHLORIDE 150 MILLIGRAM(S): 150 CAPSULE ORAL at 07:51

## 2017-04-25 RX ADMIN — Medication 0.12 MILLIGRAM(S): at 21:25

## 2017-04-25 RX ADMIN — Medication 150 MILLIGRAM(S): at 13:27

## 2017-04-25 RX ADMIN — DORZOLAMIDE HYDROCHLORIDE TIMOLOL MALEATE 1 DROP(S): 20; 5 SOLUTION/ DROPS OPHTHALMIC at 21:24

## 2017-04-25 RX ADMIN — PIPERACILLIN AND TAZOBACTAM 25 GRAM(S): 4; .5 INJECTION, POWDER, LYOPHILIZED, FOR SOLUTION INTRAVENOUS at 21:24

## 2017-04-25 RX ADMIN — PANTOPRAZOLE SODIUM 40 MILLIGRAM(S): 20 TABLET, DELAYED RELEASE ORAL at 09:32

## 2017-04-25 RX ADMIN — MEXILETINE HYDROCHLORIDE 150 MILLIGRAM(S): 150 CAPSULE ORAL at 18:18

## 2017-04-25 RX ADMIN — Medication 1 TABLET(S): at 11:27

## 2017-04-25 NOTE — PROGRESS NOTE ADULT - SUBJECTIVE AND OBJECTIVE BOX
Patient is a 77y old  Male who presents with a chief complaint of infected Right  great toe x 3 months (20 Apr 2017 22:41)      HPI:  77 y.o. male with PMH VT s/p AICD, AFib on AC, CAD s/p CABGx2, DM2, gout, glaucoma,  HLD, chronic systolic  CHF, PVD, CKD 2-3,hx of vasectomy  ,S/p  R femoral endarterectomy feb /6/2017   presents to ED sent from Dr. Gamble's office for IV abx c/o pain, necrosis, erythema of infected first metatarsal on right foot. +Drainage. Has hx of recurrent infection in this toe. Had bypass in [facility] with Dr. Wesley which temporarily helped sx but sx have been worsening recently. States usually pulse is only detected with doppler.  Denies fever. NKDA.  Patient was seen in ED by podiatry dr Gamble Blood cx were drawn and zosyn and vanco admisnitered in ED EKG- NSR,nonspecific intraventricular block,no significant  ST/T chnages as compared to EKG done aug/2016  ROS- denies fever ,chills,chest pain ,SOB,headache,palpitations,abdominal pain,dysuria,flank pain ,vomiting,nausea ,diarrhea PMH: as above PSH:  CABG, AICD placement, vasectomy, R femoral endarterectomy  Allergy:   NKDA SH:  ex smoker, quit about 30 years ago, denies drinking alcohol (20 Apr 2017 21:57)      Allergies    No Known Allergies    Intolerances        MEDICATIONS  (STANDING):  aspirin enteric coated 81milliGRAM(s) Oral at bedtime  lisinopril 5milliGRAM(s) Oral at bedtime  digoxin     Tablet 0.125milliGRAM(s) Oral at bedtime  mexiletine 150milliGRAM(s) Oral every 12 hours  allopurinol 100milliGRAM(s) Oral after dinner  carvedilol 25milliGRAM(s) Oral every 12 hours  dorzolamide 2%/timolol 0.5% Ophthalmic Solution 1Drop(s) Left EYE two times a day  latanoprost 0.005% Ophthalmic Solution 1Drop(s) Left EYE at bedtime  pantoprazole    Tablet 40milliGRAM(s) Oral before breakfast  atorvastatin 5milliGRAM(s) Oral at bedtime  piperacillin/tazobactam IVPB. 3.375Gram(s) IV Intermittent every 8 hours  insulin lispro (HumaLOG) corrective regimen sliding scale  SubCutaneous three times a day before meals  insulin lispro (HumaLOG) corrective regimen sliding scale  SubCutaneous at bedtime  dextrose 5%. 1000milliLiter(s) IV Continuous <Continuous>  dextrose 50% Injectable 12.5Gram(s) IV Push once  dextrose 50% Injectable 25Gram(s) IV Push once  dextrose 50% Injectable 25Gram(s) IV Push once  vancomycin  IVPB 750milliGRAM(s) IV Intermittent every 24 hours  lactobacillus acidophilus 1Tablet(s) Oral daily  enoxaparin Injectable 40milliGRAM(s) SubCutaneous every 24 hours    MEDICATIONS  (PRN):  dextrose Gel 1Dose(s) Oral once PRN Blood Glucose LESS THAN 70 milliGRAM(s)/deciliter  glucagon  Injectable 1milliGRAM(s) IntraMuscular once PRN Glucose LESS THAN 70 milligrams/deciliter        RADIOLOGY    CBC Full  -  ( 25 Apr 2017 06:08 )  WBC Count : 9.5 K/uL  Hemoglobin : 11.9 g/dL  Hematocrit : 37.2 %  Platelet Count - Automated : 126 K/uL  Mean Cell Volume : 88.2 fl  Mean Cell Hemoglobin : 28.2 pg  Mean Cell Hemoglobin Concentration : 32.0 gm/dL  Auto Neutrophil # : x  Auto Lymphocyte # : x  Auto Monocyte # : x  Auto Eosinophil # : x  Auto Basophil # : x  Auto Neutrophil % : x  Auto Lymphocyte % : x  Auto Monocyte % : x  Auto Eosinophil % : x  Auto Basophil % : x

## 2017-04-25 NOTE — PROGRESS NOTE ADULT - SUBJECTIVE AND OBJECTIVE BOX
HPI: 78 y/o male with a h/o VT s/p AICD, AFib on AC, CAD s/p CABGx2, DM2, gout, glaucoma,  HLD, chronic systolic CHF, PVD, CKD 2-3,hx of vasectomy, S/p R femoral endarterectomy on 2/6/2017, came to the ED on 4/20/17 after being sent from Dr. Gamble's office for IV abx due to pain, necrosis, erythema of infected first metatarsal of right foot. Pt states the wound began approximately 3 months ago, and there has been a clear fluid drainage. Pt states he has a hx of recurrent infection in this toe. Had bypass in [facility] with Dr. Royal 2 months ago which temporarily helped symptoms but symptoms have been worsening recently.     4/22: Pt seen and examined. No new complaints.   4/23 - feels well. no cp, sob.    4/24 - feels well. ambulating well.   4/25: No complaints at this time. No fever, chills, N, V, pain.    MEDICATIONS  (STANDING):  aspirin enteric coated 81milliGRAM(s) Oral at bedtime  lisinopril 5milliGRAM(s) Oral at bedtime  digoxin     Tablet 0.125milliGRAM(s) Oral at bedtime  mexiletine 150milliGRAM(s) Oral every 12 hours  allopurinol 100milliGRAM(s) Oral after dinner  carvedilol 25milliGRAM(s) Oral every 12 hours  dorzolamide 2%/timolol 0.5% Ophthalmic Solution 1Drop(s) Left EYE two times a day  latanoprost 0.005% Ophthalmic Solution 1Drop(s) Left EYE at bedtime  pantoprazole    Tablet 40milliGRAM(s) Oral before breakfast  atorvastatin 5milliGRAM(s) Oral at bedtime  piperacillin/tazobactam IVPB. 3.375Gram(s) IV Intermittent every 8 hours  insulin lispro (HumaLOG) corrective regimen sliding scale  SubCutaneous three times a day before meals  insulin lispro (HumaLOG) corrective regimen sliding scale  SubCutaneous at bedtime  dextrose 5%. 1000milliLiter(s) IV Continuous <Continuous>  dextrose 50% Injectable 12.5Gram(s) IV Push once  dextrose 50% Injectable 25Gram(s) IV Push once  dextrose 50% Injectable 25Gram(s) IV Push once  vancomycin  IVPB 750milliGRAM(s) IV Intermittent every 24 hours  lactobacillus acidophilus 1Tablet(s) Oral daily  enoxaparin Injectable 40milliGRAM(s) SubCutaneous every 24 hours    MEDICATIONS  (PRN):  dextrose Gel 1Dose(s) Oral once PRN Blood Glucose LESS THAN 70 milliGRAM(s)/deciliter  glucagon  Injectable 1milliGRAM(s) IntraMuscular once PRN Glucose LESS THAN 70 milligrams/deciliter      REVIEW OF SYSTEMS: as per HPI other wise negative     Vital Signs Last 24 Hrs  T(C): 36.6, Max: 36.8 (04-25 @ 06:27)  T(F): 97.9, Max: 98.3 (04-25 @ 06:27)  HR: 70 (70 - 85)  BP: 107/40 (107/40 - 135/55)  BP(mean): --  RR: 16 (15 - 16)  SpO2: 100% (95% - 100%)      PHYSICAL EXAM:  Constitutional: Well appearing, NAD  HEENT: Atraumatic, ENRIQUETA, Normal, No congestion  Respiratory: Breath Sounds normal b/l, no rales/rhonchi/wheeze  Cardiovascular: Normal S1S2; No murmurs, gallops, or rubs.  Gastrointestinal: Nondistended. Abdomen soft, non tender, Bowel sounds present in all 4 quadrants.  Extremities: Erythema and swelling of medial side of 1st metatarsal of right toe with surrounding black eschar. Lysis of nail of 1st metatarsal, tender to palpation. No edema of lower extremities.   Neurological: AAO x 3, no gross focal motor deficits  Back: No CVA tenderness   Musculoskeletal: non tender  Breasts: Deferred  Genitourinary: deferred  Rectal: Deferred                                11.9   9.5   )-----------( 126      ( 25 Apr 2017 06:08 )             37.2     04-25    141  |  107  |  33<H>  ----------------------------<  107<H>  4.1   |  24  |  1.86<H>    Ca    9.2      25 Apr 2017 06:08      CAPILLARY BLOOD GLUCOSE  182 (25 Apr 2017 12:44)  125 (25 Apr 2017 09:27)  173 (24 Apr 2017 21:25)  138 (24 Apr 2017 17:01)      PT/INR - ( 24 Apr 2017 06:55 )   PT: 20.8 sec;   INR: 1.90 ratio       4/25:  IMPRESSION: AbnormalTc-99m labeled leukocyte scan. Osteomyelitis right   great toe    Assessment and Plan:   · Assessment		  78 yo male with PMH of VT s/p AICD, AFib on AC, CAD s/p CABGx2, DM2, gout, glaucoma,  HLD, chronic systolic CHF, PVD, CKD 2-3,hx of vasectomy, S/p R femoral endarterectomy on 2/6/2017, admitted for toe infection.    Problem/Plan - 1:  ·  Problem: Toe infection.  Plan: - Cellulitis of right hallux with gangrene/OM  - continue vanco and zosyn day 5  - Blood cx neg to date  - WBC scan positive for OM of right hallux.  - Plan for possible surgery as per dr james.  Pt medically optimized for surgery. EKG with Normal sinus rhythm, no active cardiac complaints. Pt is low risk for intermediate risk procedure.    Problem/Plan - 2:  ·  Problem: Afib.  Plan: - Hx of afib.  - Continue digoxin, and carvedilol.  - will hold coumadin in anticipation of surgery     Problem/Plan - 3:  ·  Problem: CKD (chronic kidney disease).  Plan: - Hold quinapril and lasix for tonight  - Hold IVF for now d/t hx of chronic systolic HF.     Problem/Plan - 4:  ·  Problem: Chronic systolic heart failure.  Plan: - clinically stable  - Hold quinapril and lasix .  - Continue digoxin, carvedilol, and mexeletine.     Problem/Plan - 5:  ·  Problem: Diabetes mellitus.  Plan: - Continue current insulin regimen.     dvt ppx:  -heparin subc

## 2017-04-26 LAB
ALBUMIN SERPL ELPH-MCNC: 3.4 G/DL — SIGNIFICANT CHANGE UP (ref 3.3–5)
ALP SERPL-CCNC: 53 U/L — SIGNIFICANT CHANGE UP (ref 40–120)
ALT FLD-CCNC: 23 U/L — SIGNIFICANT CHANGE UP (ref 12–78)
ANION GAP SERPL CALC-SCNC: 10 MMOL/L — SIGNIFICANT CHANGE UP (ref 5–17)
ANION GAP SERPL CALC-SCNC: 11 MMOL/L — SIGNIFICANT CHANGE UP (ref 5–17)
AST SERPL-CCNC: 17 U/L — SIGNIFICANT CHANGE UP (ref 15–37)
BILIRUB SERPL-MCNC: 0.6 MG/DL — SIGNIFICANT CHANGE UP (ref 0.2–1.2)
BUN SERPL-MCNC: 29 MG/DL — HIGH (ref 7–23)
BUN SERPL-MCNC: 31 MG/DL — HIGH (ref 7–23)
CALCIUM SERPL-MCNC: 9 MG/DL — SIGNIFICANT CHANGE UP (ref 8.5–10.1)
CALCIUM SERPL-MCNC: 9.1 MG/DL — SIGNIFICANT CHANGE UP (ref 8.5–10.1)
CHLORIDE SERPL-SCNC: 108 MMOL/L — SIGNIFICANT CHANGE UP (ref 96–108)
CHLORIDE SERPL-SCNC: 109 MMOL/L — HIGH (ref 96–108)
CO2 SERPL-SCNC: 22 MMOL/L — SIGNIFICANT CHANGE UP (ref 22–31)
CO2 SERPL-SCNC: 23 MMOL/L — SIGNIFICANT CHANGE UP (ref 22–31)
CREAT SERPL-MCNC: 1.88 MG/DL — HIGH (ref 0.5–1.3)
CREAT SERPL-MCNC: 1.9 MG/DL — HIGH (ref 0.5–1.3)
CULTURE RESULTS: SIGNIFICANT CHANGE UP
GLUCOSE SERPL-MCNC: 104 MG/DL — HIGH (ref 70–99)
GLUCOSE SERPL-MCNC: 138 MG/DL — HIGH (ref 70–99)
HCT VFR BLD CALC: 37.1 % — LOW (ref 39–50)
HGB BLD-MCNC: 12.6 G/DL — LOW (ref 13–17)
INR BLD: 1.36 RATIO — HIGH (ref 0.88–1.16)
MCHC RBC-ENTMCNC: 30 PG — SIGNIFICANT CHANGE UP (ref 27–34)
MCHC RBC-ENTMCNC: 33.8 GM/DL — SIGNIFICANT CHANGE UP (ref 32–36)
MCV RBC AUTO: 88.7 FL — SIGNIFICANT CHANGE UP (ref 80–100)
PLATELET # BLD AUTO: 130 K/UL — LOW (ref 150–400)
POTASSIUM SERPL-MCNC: 3.7 MMOL/L — SIGNIFICANT CHANGE UP (ref 3.5–5.3)
POTASSIUM SERPL-MCNC: 4.1 MMOL/L — SIGNIFICANT CHANGE UP (ref 3.5–5.3)
POTASSIUM SERPL-SCNC: 3.7 MMOL/L — SIGNIFICANT CHANGE UP (ref 3.5–5.3)
POTASSIUM SERPL-SCNC: 4.1 MMOL/L — SIGNIFICANT CHANGE UP (ref 3.5–5.3)
PROT SERPL-MCNC: 6.6 GM/DL — SIGNIFICANT CHANGE UP (ref 6–8.3)
PROTHROM AB SERPL-ACNC: 14.8 SEC — HIGH (ref 9.8–12.7)
RBC # BLD: 4.18 M/UL — LOW (ref 4.2–5.8)
RBC # FLD: 15.5 % — HIGH (ref 10.3–14.5)
SODIUM SERPL-SCNC: 141 MMOL/L — SIGNIFICANT CHANGE UP (ref 135–145)
SODIUM SERPL-SCNC: 142 MMOL/L — SIGNIFICANT CHANGE UP (ref 135–145)
SPECIMEN SOURCE: SIGNIFICANT CHANGE UP
WBC # BLD: 8.6 K/UL — SIGNIFICANT CHANGE UP (ref 3.8–10.5)
WBC # FLD AUTO: 8.6 K/UL — SIGNIFICANT CHANGE UP (ref 3.8–10.5)

## 2017-04-26 RX ORDER — SODIUM CHLORIDE 9 MG/ML
1000 INJECTION INTRAMUSCULAR; INTRAVENOUS; SUBCUTANEOUS
Qty: 0 | Refills: 0 | Status: DISCONTINUED | OUTPATIENT
Start: 2017-04-27 | End: 2017-04-27

## 2017-04-26 RX ADMIN — LISINOPRIL 5 MILLIGRAM(S): 2.5 TABLET ORAL at 21:57

## 2017-04-26 RX ADMIN — CARVEDILOL PHOSPHATE 25 MILLIGRAM(S): 80 CAPSULE, EXTENDED RELEASE ORAL at 16:44

## 2017-04-26 RX ADMIN — PANTOPRAZOLE SODIUM 40 MILLIGRAM(S): 20 TABLET, DELAYED RELEASE ORAL at 05:34

## 2017-04-26 RX ADMIN — Medication 1 TABLET(S): at 11:16

## 2017-04-26 RX ADMIN — Medication 1: at 12:46

## 2017-04-26 RX ADMIN — PIPERACILLIN AND TAZOBACTAM 25 GRAM(S): 4; .5 INJECTION, POWDER, LYOPHILIZED, FOR SOLUTION INTRAVENOUS at 21:58

## 2017-04-26 RX ADMIN — Medication 100 MILLIGRAM(S): at 16:44

## 2017-04-26 RX ADMIN — MEXILETINE HYDROCHLORIDE 150 MILLIGRAM(S): 150 CAPSULE ORAL at 16:44

## 2017-04-26 RX ADMIN — PIPERACILLIN AND TAZOBACTAM 25 GRAM(S): 4; .5 INJECTION, POWDER, LYOPHILIZED, FOR SOLUTION INTRAVENOUS at 12:47

## 2017-04-26 RX ADMIN — Medication 81 MILLIGRAM(S): at 21:58

## 2017-04-26 RX ADMIN — MEXILETINE HYDROCHLORIDE 150 MILLIGRAM(S): 150 CAPSULE ORAL at 06:27

## 2017-04-26 RX ADMIN — LATANOPROST 1 DROP(S): 0.05 SOLUTION/ DROPS OPHTHALMIC; TOPICAL at 21:58

## 2017-04-26 RX ADMIN — DORZOLAMIDE HYDROCHLORIDE TIMOLOL MALEATE 1 DROP(S): 20; 5 SOLUTION/ DROPS OPHTHALMIC at 21:58

## 2017-04-26 RX ADMIN — HEPARIN SODIUM 5000 UNIT(S): 5000 INJECTION INTRAVENOUS; SUBCUTANEOUS at 16:43

## 2017-04-26 RX ADMIN — HEPARIN SODIUM 5000 UNIT(S): 5000 INJECTION INTRAVENOUS; SUBCUTANEOUS at 05:34

## 2017-04-26 RX ADMIN — PIPERACILLIN AND TAZOBACTAM 25 GRAM(S): 4; .5 INJECTION, POWDER, LYOPHILIZED, FOR SOLUTION INTRAVENOUS at 05:33

## 2017-04-26 RX ADMIN — CARVEDILOL PHOSPHATE 25 MILLIGRAM(S): 80 CAPSULE, EXTENDED RELEASE ORAL at 05:34

## 2017-04-26 RX ADMIN — Medication 150 MILLIGRAM(S): at 16:45

## 2017-04-26 RX ADMIN — Medication 0.12 MILLIGRAM(S): at 21:58

## 2017-04-26 RX ADMIN — DORZOLAMIDE HYDROCHLORIDE TIMOLOL MALEATE 1 DROP(S): 20; 5 SOLUTION/ DROPS OPHTHALMIC at 05:34

## 2017-04-26 NOTE — PROGRESS NOTE ADULT - SUBJECTIVE AND OBJECTIVE BOX
HPI: 78 y/o male with a h/o VT s/p AICD, AFib on AC, CAD s/p CABGx2, DM2, gout, glaucoma,  HLD, chronic systolic CHF, PVD, CKD 2-3,hx of vasectomy, S/p R femoral endarterectomy on 2/6/2017, came to the ED on 4/20/17 after being sent from Dr. Gamble's office for IV abx due to pain, necrosis, erythema of infected first metatarsal of right foot. Pt states the wound began approximately 3 months ago, and there has been a clear fluid drainage. Pt states he has a hx of recurrent infection in this toe. Had bypass in [facility] with Dr. Royal 2 months ago which temporarily helped symptoms but symptoms have been worsening recently.     4/22: Pt seen and examined. No new complaints.   4/23 - feels well. no cp, sob.    4/24 - feels well. ambulating well.   4/25: No complaints at this time. No fever, chills, N, V, pain.  4/26: No complaints. Pt very reluctant to amputate his infected/necrotic toe.      MEDICATIONS  (STANDING):  aspirin enteric coated 81milliGRAM(s) Oral at bedtime  lisinopril 5milliGRAM(s) Oral at bedtime  digoxin     Tablet 0.125milliGRAM(s) Oral at bedtime  mexiletine 150milliGRAM(s) Oral every 12 hours  allopurinol 100milliGRAM(s) Oral after dinner  carvedilol 25milliGRAM(s) Oral every 12 hours  dorzolamide 2%/timolol 0.5% Ophthalmic Solution 1Drop(s) Left EYE two times a day  latanoprost 0.005% Ophthalmic Solution 1Drop(s) Left EYE at bedtime  pantoprazole    Tablet 40milliGRAM(s) Oral before breakfast  atorvastatin 5milliGRAM(s) Oral at bedtime  piperacillin/tazobactam IVPB. 3.375Gram(s) IV Intermittent every 8 hours  insulin lispro (HumaLOG) corrective regimen sliding scale  SubCutaneous three times a day before meals  insulin lispro (HumaLOG) corrective regimen sliding scale  SubCutaneous at bedtime  dextrose 5%. 1000milliLiter(s) IV Continuous <Continuous>  dextrose 50% Injectable 12.5Gram(s) IV Push once  dextrose 50% Injectable 25Gram(s) IV Push once  dextrose 50% Injectable 25Gram(s) IV Push once  vancomycin  IVPB 750milliGRAM(s) IV Intermittent every 24 hours  lactobacillus acidophilus 1Tablet(s) Oral daily  heparin  Injectable 5000Unit(s) SubCutaneous every 12 hours    MEDICATIONS  (PRN):  dextrose Gel 1Dose(s) Oral once PRN Blood Glucose LESS THAN 70 milliGRAM(s)/deciliter  glucagon  Injectable 1milliGRAM(s) IntraMuscular once PRN Glucose LESS THAN 70 milligrams/deciliter      REVIEW OF SYSTEMS: as per HPI other wise negative     Vital Signs Last 24 Hrs  T(C): 36.3, Max: 36.9 (04-26 @ 00:04)  T(F): 97.3, Max: 98.4 (04-26 @ 00:04)  HR: 70 (64 - 72)  BP: 125/61 (108/52 - 128/67)  BP(mean): --  RR: 16 (16 - 17)  SpO2: 100% (97% - 100%)      PHYSICAL EXAM:  Constitutional: Well appearing, NAD  HEENT: Atraumatic, ENRIQUETA, Normal, No congestion  Respiratory: Breath Sounds normal b/l, no rales/rhonchi/wheeze  Cardiovascular: Normal S1S2; No murmurs, gallops, or rubs.  Gastrointestinal: Nondistended. Abdomen soft, non tender, Bowel sounds present in all 4 quadrants.  Extremities: Erythema and swelling of medial side of 1st metatarsal of right toe with surrounding black eschar. Lysis of nail of 1st metatarsal, tender to palpation. No edema of lower extremities.   Neurological: AAO x 3, no gross focal motor deficits  Back: No CVA tenderness   Musculoskeletal: non tender  Breasts: Deferred  Genitourinary: deferred  Rectal: Deferred                                11.9   9.5   )-----------( 126      ( 25 Apr 2017 06:08 )             37.2     04-26    142  |  109<H>  |  29<H>  ----------------------------<  104<H>  3.7   |  22  |  1.88<H>    Ca    9.0      26 Apr 2017 06:23      CAPILLARY BLOOD GLUCOSE  179 (26 Apr 2017 12:19)  123 (26 Apr 2017 08:14)  154 (25 Apr 2017 21:36)  112 (25 Apr 2017 18:11)      PT/INR - ( 26 Apr 2017 06:23 )   PT: 14.8 sec;   INR: 1.36 ratio           4/25:  IMPRESSION: AbnormalTc-99m labeled leukocyte scan. Osteomyelitis right   great toe    Assessment and Plan:   · Assessment		  78 yo male with PMH of VT s/p AICD, AFib on AC, CAD s/p CABGx2, DM2, gout, glaucoma,  HLD, chronic systolic CHF, PVD, CKD 2-3,hx of vasectomy, S/p R femoral endarterectomy on 2/6/2017, admitted for toe infection.    Problem/Plan - 1:  ·  Problem: Toe infection.  Plan: - Cellulitis of right hallux with gangrene/OM/necrosis  - continue vanco and zosyn day 6 per ID  - Blood cx neg to date  - WBC scan positive for OM of right hallux.  - Plan was for surgery as per dr james, however pt reluctant to go through with it at this time.  He was offered alternate therapy with long term IV abx for OM however given toe necrosis highly unlikely to benefit pt.  ID to discuss with patient.  Will await his decision.    -Pt medically optimized for surgery. EKG with Normal sinus rhythm, no active cardiac complaints. Pt is low risk for intermediate risk procedure.    Problem/Plan - 2:  ·  Problem: Afib.  Plan: - Hx of afib.  - Continue digoxin, and carvedilol.  - will hold coumadin in anticipation of surgery     Problem/Plan - 3:  ·  Problem: CKD (chronic kidney disease).  Plan: - Hold quinapril and lasix for tonight  - Hold IVF for now d/t hx of chronic systolic HF.     Problem/Plan - 4:  ·  Problem: Chronic systolic heart failure.  Plan: - clinically stable  - Hold quinapril and lasix .  - Continue digoxin, carvedilol, and mexeletine.     Problem/Plan - 5:  ·  Problem: Diabetes mellitus.  Plan: - Continue current insulin regimen.     dvt ppx:  -heparin subc    Attending Statement:  >35 minutes spent on total encounter; more than 50% of the visit was spent counseling and/or coordinating care by the attending physician.

## 2017-04-26 NOTE — PROGRESS NOTE ADULT - ASSESSMENT
Necrotic R Hallux / local cellulitis. Now Bone Scan suggests OM / interval change from previous study. Discussed with him Tx op. He wants to try a PICC R?B/A/C freely discussed with pt. For PICC line SS Consult [facility]. THANKS

## 2017-04-26 NOTE — PROGRESS NOTE ADULT - SUBJECTIVE AND OBJECTIVE BOX
78 y/o male with a h/o VT s/p AICD, AFib on AC, CAD s/p CABGx2, DM2, gout, glaucoma,  HLD, chronic systolic  CHF, PVD, CKD 2-3,hx of vasectomy,S/p R femoral endarterectomy 2/6//2017 came to the ED on 4/20/17 after being sent from Dr. Gamble's office for IV abx due to pain, necrosis, erythema of infected first metatarsal of right foot. Pt states the wound began approximatley 3 months ago and there has been a clear fluid drainage. Pt states he has a hx of recurrent infection in this toe. States he had bypass in [facility] with Dr. Royal 2 months ago which temporarily helped symptoms but symptoms have been worsening recently. Denies fever, chills, nausea, vomiting, SOB, chest pain, headache, abdominal pain.  Today 4/22 patient ambulating  Today 4/23 patient without complaints  Today 4/24 patient had start of nuclear scan study  Today 4/26 patient equivocal about surgery      MEDICATIONS  (STANDING):  aspirin enteric coated 81milliGRAM(s) Oral at bedtime  lisinopril 5milliGRAM(s) Oral at bedtime  digoxin     Tablet 0.125milliGRAM(s) Oral at bedtime  mexiletine 150milliGRAM(s) Oral every 12 hours  allopurinol 100milliGRAM(s) Oral after dinner  carvedilol 25milliGRAM(s) Oral every 12 hours  dorzolamide 2%/timolol 0.5% Ophthalmic Solution 1Drop(s) Left EYE two times a day  latanoprost 0.005% Ophthalmic Solution 1Drop(s) Left EYE at bedtime  pantoprazole    Tablet 40milliGRAM(s) Oral before breakfast  atorvastatin 5milliGRAM(s) Oral at bedtime  piperacillin/tazobactam IVPB. 3.375Gram(s) IV Intermittent every 8 hours  insulin lispro (HumaLOG) corrective regimen sliding scale  SubCutaneous three times a day before meals  insulin lispro (HumaLOG) corrective regimen sliding scale  SubCutaneous at bedtime  dextrose 5%. 1000milliLiter(s) IV Continuous <Continuous>  dextrose 50% Injectable 12.5Gram(s) IV Push once  dextrose 50% Injectable 25Gram(s) IV Push once  dextrose 50% Injectable 25Gram(s) IV Push once  vancomycin  IVPB 750milliGRAM(s) IV Intermittent every 24 hours  lactobacillus acidophilus 1Tablet(s) Oral daily  heparin  Injectable 5000Unit(s) SubCutaneous every 12 hours    MEDICATIONS  (PRN):  dextrose Gel 1Dose(s) Oral once PRN Blood Glucose LESS THAN 70 milliGRAM(s)/deciliter  glucagon  Injectable 1milliGRAM(s) IntraMuscular once PRN Glucose LESS THAN 70 milligrams/deciliter      Vital Signs Last 24 Hrs  T(C): 36.3, Max: 36.9 (04-26 @ 00:04)  T(F): 97.3, Max: 98.4 (04-26 @ 00:04)  HR: 70 (64 - 72)  BP: 125/61 (108/52 - 128/67)  BP(mean): --  RR: 16 (16 - 17)  SpO2: 100% (97% - 100%)    Physical Exam:              PHYSICAL EXAM:  Constitutional: Well appearing,NAD  HEENT: Atraumatic, ENRIQUETA, Normal, No congestion  Respiratory: Breath Sounds normal, no rhonchi/wheeze  Cardiovascular: N S1S2; DANTE present  Gastrointestinal: Abdomen soft, non tender, Bowel sounds present  Extremities: No B/l lower extremity and pedal edema, except small area of erythema and swelling on great right toe surrounding black eschar. Right great toe lysis of nail with dorsal black eschar,with surrounding 1st metatarsal  skin erythema with mild swelling and tenderness, decreased sensation to tight touch  Neurological: AAO x 3, no gross focal motor deficits    Back: No CVA tenderness   Musculoskeletal: non tender  Breasts: Deferred  Genitourinary: deferred  Rectal: Deferred    LABS: All Labs Reviewed:           Labs:                        11.9   9.5   )-----------( 126      ( 25 Apr 2017 06:08 )             37.2     04-26    142  |  109<H>  |  29<H>  ----------------------------<  104<H>  3.7   |  22  |  1.88<H>    Ca    9.0      26 Apr 2017 06:23         Vancomycin Level, Trough: 8.0 ug/mL (04-25 @ 11:12)      Cultures:                    RADIOLOGY/EKG:  EXAM:  FOOT-RIGHT                            PROCEDURE DATE:  04/20/2017        INTERPRETATION:  Right foot    Indication: Back toe infection and swelling, rule out osteomyelitis    IMPRESSION: 3 views demonstrate superficial ulceration at the medial   aspect of the first distal phalanx. No underlying ostial lysis or soft   tissue gas, no visible fracture or dislocation. Vascular calcifications   are noted.      EXAM:  CHEST SINGLE VIEW FRONTAL                            PROCEDURE DATE:  04/20/2017        INTERPRETATION:    INDICATION: History of diabetes great artery disease.    Single frontal view of chest dated 4/20/2017 9:16 PM.  Prior study of   1/26/2017.    FINDINGS /   IMPRESSION:     There is no acute consolidation.  There are no pleural effusion. Patient   is status post sternotomy. There is cardiomegaly. There is dual-lead   pacemaker.    There are degenerative changes.     EXAM:  NM INFLAMMATORY LOC WBC LTD                            PROCEDURE DATE:  04/25/2017        INTERPRETATION:  RADIOPHARMACEUTICAL: 10.0 millicuries Tc-99m labeled   autologous leukocytes, IV.    CLINICAL INFORMATION: 77 year old male with rightgreat toe infection;   referred to evaluate for osteomyelitis.    TECHNIQUE: Radiolabeled autologous leukocytes were injected on 4/24/2017.   Approximately T9 hours later on 4/25/2017 images of the feet were   obtained in the dorsal, plantar, medial and lateral projections.    COMPARISON: Labeled leukocyte study performed on 1/28/2017 was reviewed.    FINDINGS: There is intensely increased labeled leukocyte accumulation in   the right great toe which involves both soft tissue and bone.    IMPRESSION: AbnormalTc-99m labeled leukocyte scan. Osteomyelitis right   great toe.    This is a new finding since the previous study of 1/28/2017.  Code status: Full resuscitation

## 2017-04-26 NOTE — PROGRESS NOTE ADULT - SUBJECTIVE AND OBJECTIVE BOX
Patient is a 77y old  Male who presents with a chief complaint of infected Right  great toe x 3 months (20 Apr 2017 22:41)      HPI:  77 y.o. male with PMH VT s/p AICD, AFib on AC, CAD s/p CABGx2, DM2, gout, glaucoma,  HLD, chronic systolic  CHF, PVD, CKD 2-3,hx of vasectomy  ,S/p  R femoral endarterectomy feb /6/2017   presents to ED sent from Dr. Gamble's office for IV abx c/o pain, necrosis, erythema of infected first metatarsal on right foot. +Drainage. Has hx of recurrent infection in this toe. Had bypass in [facility] with Dr. Wesley which temporarily helped sx but sx have been worsening recently. States usually pulse is only detected with doppler.  Denies fever. NKDA.  Patient was seen in ED by podiatry dr Gamble Blood cx were drawn and zosyn and vanco admisnitered in ED EKG- NSR,nonspecific intraventricular block,no significant  ST/T chnages as compared to EKG done aug/2016  ROS- denies fever ,chills,chest pain ,SOB,headache,palpitations,abdominal pain,dysuria,flank pain ,vomiting,nausea ,diarrhea PMH: as above PSH:  CABG, AICD placement, vasectomy, R femoral endarterectomy  Allergy:   NKDA SH:  ex smoker, quit about 30 years ago, denies drinking alcohol (20 Apr 2017 21:57)      Allergies    No Known Allergies    Intolerances        MEDICATIONS  (STANDING):  aspirin enteric coated 81milliGRAM(s) Oral at bedtime  lisinopril 5milliGRAM(s) Oral at bedtime  digoxin     Tablet 0.125milliGRAM(s) Oral at bedtime  mexiletine 150milliGRAM(s) Oral every 12 hours  allopurinol 100milliGRAM(s) Oral after dinner  carvedilol 25milliGRAM(s) Oral every 12 hours  dorzolamide 2%/timolol 0.5% Ophthalmic Solution 1Drop(s) Left EYE two times a day  latanoprost 0.005% Ophthalmic Solution 1Drop(s) Left EYE at bedtime  pantoprazole    Tablet 40milliGRAM(s) Oral before breakfast  atorvastatin 5milliGRAM(s) Oral at bedtime  piperacillin/tazobactam IVPB. 3.375Gram(s) IV Intermittent every 8 hours  insulin lispro (HumaLOG) corrective regimen sliding scale  SubCutaneous three times a day before meals  insulin lispro (HumaLOG) corrective regimen sliding scale  SubCutaneous at bedtime  dextrose 5%. 1000milliLiter(s) IV Continuous <Continuous>  dextrose 50% Injectable 12.5Gram(s) IV Push once  dextrose 50% Injectable 25Gram(s) IV Push once  dextrose 50% Injectable 25Gram(s) IV Push once  vancomycin  IVPB 750milliGRAM(s) IV Intermittent every 24 hours  lactobacillus acidophilus 1Tablet(s) Oral daily  heparin  Injectable 5000Unit(s) SubCutaneous every 12 hours    MEDICATIONS  (PRN):  dextrose Gel 1Dose(s) Oral once PRN Blood Glucose LESS THAN 70 milliGRAM(s)/deciliter  glucagon  Injectable 1milliGRAM(s) IntraMuscular once PRN Glucose LESS THAN 70 milligrams/deciliter        RADIOLOGYEXAM:  NM INFLAMMATORY LOC WBC LTD                            PROCEDURE DATE:  04/25/2017        INTERPRETATION:  RADIOPHARMACEUTICAL: 10.0 millicuries Tc-99m labeled   autologous leukocytes, IV.    CLINICAL INFORMATION: 77 year old male with rightgreat toe infection;   referred to evaluate for osteomyelitis.    TECHNIQUE: Radiolabeled autologous leukocytes were injected on 4/24/2017.   Approximately T9 hours later on 4/25/2017 images of the feet were   obtained in the dorsal, plantar, medial and lateral projections.    COMPARISON: Labeled leukocyte study performed on 1/28/2017 was reviewed.    FINDINGS: There is intensely increased labeled leukocyte accumulation in   the right great toe which involves both soft tissue and bone.    IMPRESSION: AbnormalTc-99m labeled leukocyte scan. Osteomyelitis right   great toe.    This is a new finding since the previous study of 1/28/2017.              ANGIE ALEX   This document has been electronically signed. Apr 25 2017  9:37AM              CBC Full  -  ( 25 Apr 2017 06:08 )  WBC Count : 9.5 K/uL  Hemoglobin : 11.9 g/dL  Hematocrit : 37.2 %  Platelet Count - Automated : 126 K/uL  Mean Cell Volume : 88.2 fl  Mean Cell Hemoglobin : 28.2 pg  Mean Cell Hemoglobin Concentration : 32.0 gm/dL  Auto Neutrophil # : x  Auto Lymphocyte # : x  Auto Monocyte # : x  Auto Eosinophil # : x  Auto Basophil # : x  Auto Neutrophil % : x  Auto Lymphocyte % : x  Auto Monocyte % : x  Auto Eosinophil % : x  Auto Basophil % : x      04-25    141  |  107  |  33<H>  ----------------------------<  107<H>  4.1   |  24  |  1.86<H>    Ca    9.2      25 Apr 2017 06:08

## 2017-04-26 NOTE — PROGRESS NOTE ADULT - ASSESSMENT
76 y/o male with a h/o VT s/p AICD, AFib on AC, CAD s/p CABGx2, DM2, gout, glaucoma,  HLD, chronic systolic  CHF, PVD, CKD 2-3,hx of vasectomy,S/p R femoral endarterectomy 2/6//2017 came to the ED on 4/20/17 after being sent from Dr. Gamble's office for IV abx due to pain, necrosis, erythema of infected first metatarsal of right foot. Pt states the wound began approximatley 3 months ago and there has been a clear fluid drainage. Pt states he has a hx of recurrent infection in this toe. States he had bypass in [facility] with Dr. Royal 2 months ago which temporarily helped symptoms but symptoms have been worsening recently. Denies fever, chills, nausea, vomiting, SOB, chest pain, headache, abdominal pain.  1. Right first toe cellulitis with area of gangrene, now found to have osteomyelitis  - day # 6 zosyn and vancomycin  - tolerating antibiotics without rashes or side effects   - discussed that placing picc and 6 weeks antibiotics would most likely put the patient at risk for further progression of infection, cdiff, rash, iv complications such as phlebitis, given that there is gangrene and most likely a bony sequestrum underneath; patient will now opt for toe amputation  2. other issues: VT s/p AICD, AFib on AC, CAD s/p CABGx2, DM2, gout, glaucoma,  HLD, chronic systolic  CHF, PVD, CKD 2-3  - per medicine

## 2017-04-26 NOTE — CONSULT NOTE ADULT - SUBJECTIVE AND OBJECTIVE BOX
76 y/o male with a h/o VT s/p AICD, AFib on AC, CAD s/p CABGx2, DM2, gout, glaucoma,  HLD, chronic systolic  CHF, PVD, CKD 2-3,hx of vasectomy,S/p R femoral endarterectomy 2/6//2017 came to the ED on 4/20/17 after being sent from Dr. Gamble's office for IV abx due to pain, necrosis, erythema of infected first metatarsal of right foot. Pt states the wound began approximatley 3 months ago and there has been a clear fluid drainage. Pt states he has a hx of recurrent infection in this toe. States he had bypass in [facility] with Dr. Royal 2 months ago which temporarily helped symptoms but symptoms have been worsening recently. Denies fever, chills, nausea, vomiting, SOB, chest pain, headache, abdominal pain.    PMHx: Please see above    MEDICATIONS:  MEDICATIONS  (STANDING):  aspirin enteric coated 81milliGRAM(s) Oral at bedtime  lisinopril 5milliGRAM(s) Oral at bedtime  digoxin     Tablet 0.125milliGRAM(s) Oral at bedtime  mexiletine 150milliGRAM(s) Oral every 12 hours  warfarin 2.5milliGRAM(s) Oral <User Schedule>  warfarin 5milliGRAM(s) Oral <User Schedule>  allopurinol 100milliGRAM(s) Oral after dinner  carvedilol 25milliGRAM(s) Oral every 12 hours  dorzolamide 2%/timolol 0.5% Ophthalmic Solution 1Drop(s) Left EYE two times a day  latanoprost 0.005% Ophthalmic Solution 1Drop(s) Left EYE at bedtime  pantoprazole    Tablet 40milliGRAM(s) Oral before breakfast  atorvastatin 5milliGRAM(s) Oral at bedtime  piperacillin/tazobactam IVPB. 3.375Gram(s) IV Intermittent every 8 hours  insulin lispro (HumaLOG) corrective regimen sliding scale  SubCutaneous three times a day before meals  insulin lispro (HumaLOG) corrective regimen sliding scale  SubCutaneous at bedtime  dextrose 5%. 1000milliLiter(s) IV Continuous <Continuous>  dextrose 50% Injectable 12.5Gram(s) IV Push once  dextrose 50% Injectable 25Gram(s) IV Push once  dextrose 50% Injectable 25Gram(s) IV Push once    Allergies: NKFDA    Social Hx: Former smoker (quit 30 years ago). Lives with wife at home    REVIEW OF SYSTEMS:  CONSTITUTIONAL: No weakness, fevers or chills  EYES/ENT: No visual changes;  No vertigo or throat pain   NECK: No pain or stiffness  RESPIRATORY: No cough, wheezing, hemoptysis; No shortness of breath  CARDIOVASCULAR: No chest pain or palpitations  GASTROINTESTINAL: No abdominal or epigastric pain. No nausea, vomiting, or hematemesis; No diarrhea or constipation. No melena or hematochezia.  GENITOURINARY: No dysuria, frequency or hematuria  NEUROLOGICAL: No numbness or weakness  SKIN: Ulcer of the right great toe.  All other review of systems is negative unless indicated above    Vital Signs Last 24 Hrs  T(C): 36.5, Max: 36.8 (04-20 @ 18:54)  T(F): 97.7, Max: 98.3 (04-20 @ 18:54)  HR: 70 (70 - 88)  BP: 152/58 (125/56 - 165/80)  BP(mean): 100 (100 - 100)  RR: 16 (16 - 20)  SpO2: 98% (97% - 99%)    I&O's Summary    I & Os for current day (as of 21 Apr 2017 11:30)  =============================================  IN: 369 ml / OUT: 0 ml / NET: 369 ml      CAPILLARY BLOOD GLUCOSE  224 (21 Apr 2017 08:22)    PHYSICAL EXAM:  Constitutional: Well appearing,NAD  HEENT: Atraumatic, ENRIQUETA, Normal, No congestion  Respiratory: Breath Sounds normal, no rhonchi/wheeze  Cardiovascular: N S1S2; DANTE present  Gastrointestinal: Abdomen soft, non tender, Bowel sounds present  Extremities: No B/l lower extremity and pedal edema, except small area of erythema and swelling on great right toe surrounding black eschar. Right great toe lysis of nail with dorsal black eschar,with surrounding 1st metatarsal  skin erythema with mild swelling and tenderness, decreased sensation to tight touch  Neurological: AAO x 3, no gross focal motor deficits    Back: No CVA tenderness   Musculoskeletal: non tender  Breasts: Deferred  Genitourinary: deferred  Rectal: Deferred    LABS: All Labs Reviewed:                        13.0   11.4  )-----------( 117      ( 21 Apr 2017 05:43 )             38.0     04-21    138  |  104  |  30<H>  ----------------------------<  159<H>  4.3   |  25  |  1.83<H>    Ca    9.4      21 Apr 2017 05:43    TPro  6.7  /  Alb  3.6  /  TBili  0.8  /  DBili  x   /  AST  18  /  ALT  32  /  AlkPhos  58  04-21    PT/INR - ( 21 Apr 2017 05:43 )   PT: 29.5 sec;   INR: 2.68 ratio         PTT - ( 21 Apr 2017 05:43 )  PTT:41.9 sec      Blood Culture: Pending     RADIOLOGY/EKG:  EXAM:  FOOT-RIGHT                            PROCEDURE DATE:  04/20/2017        INTERPRETATION:  Right foot    Indication: Back toe infection and swelling, rule out osteomyelitis    IMPRESSION: 3 views demonstrate superficial ulceration at the medial   aspect of the first distal phalanx. No underlying ostial lysis or soft   tissue gas, no visible fracture or dislocation. Vascular calcifications   are noted.      EXAM:  CHEST SINGLE VIEW FRONTAL                            PROCEDURE DATE:  04/20/2017        INTERPRETATION:    INDICATION: History of diabetes great artery disease.    Single frontal view of chest dated 4/20/2017 9:16 PM.  Prior study of   1/26/2017.    FINDINGS /   IMPRESSION:     There is no acute consolidation.  There are no pleural effusion. Patient   is status post sternotomy. There is cardiomegaly. There is dual-lead   pacemaker.    There are degenerative changes.       Code status: Full resuscitation
Patient is a 77y old  Male who presents with a chief complaint of infected Right  great toe x 3 months (20 Apr 2017 22:41)      HPI:  77 y.o. male with PMH VT s/p AICD, AFib on AC, CAD s/p CABGx2, DM2, gout, glaucoma,  HLD, chronic systolic  CHF, PVD, CKD 2-3,hx of vasectomy  ,S/p  R femoral endarterectomy feb /6/2017   presents to ED sent from Dr. Gamble's office for IV abx c/o pain, necrosis, erythema of infected first metatarsal on right foot. +Drainage. Has hx of recurrent infection in this toe. Had bypass in [facility] with Dr. Wesley which temporarily helped sx but sx have been worsening recently. States usually pulse is only detected with doppler.  Denies fever. NKDA.  Patient was seen in ED by podiatry dr Gamble Blood cx were drawn and zosyn and vanco admisnitered in ED EKG- NSR,nonspecific intraventricular block,no significant  ST/T chnages as compared to EKG done aug/2016  ROS- denies fever ,chills,chest pain ,SOB,headache,palpitations,abdominal pain,dysuria,flank pain ,vomiting,nausea ,diarrhea PMH: as above PSH:  CABG, AICD placement, vasectomy, R femoral endarterectomy  Allergy:   NKDA SH:  ex smoker, quit about 30 years ago, denies drinking alcohol (20 Apr 2017 21:57)  Now rquires amputation of right hallux due to ostomyelitis    PAST MEDICAL & SURGICAL HISTORY:  CAD (coronary artery disease)  Diabetes: controlled by diet  Glaucoma  Afib  Hyperlipidemia  PVD (peripheral vascular disease) with claudication  CKD (chronic kidney disease)  Hypertension  Coronary artery disease  Diabetes mellitus  Heart failure  H/O vasectomy  S/P CABG x 2  AICD (automatic cardioverter/defibrillator) present: 2005 and replaced 2015      PREVIOUS DIAGNOSTIC TESTING:      ECHO  FINDINGS:    STRESS  FINDINGS:    CATHETERIZATION  FINDINGS:    MEDICATIONS  (STANDING):  aspirin enteric coated 81milliGRAM(s) Oral at bedtime  lisinopril 5milliGRAM(s) Oral at bedtime  digoxin     Tablet 0.125milliGRAM(s) Oral at bedtime  mexiletine 150milliGRAM(s) Oral every 12 hours  allopurinol 100milliGRAM(s) Oral after dinner  carvedilol 25milliGRAM(s) Oral every 12 hours  dorzolamide 2%/timolol 0.5% Ophthalmic Solution 1Drop(s) Left EYE two times a day  latanoprost 0.005% Ophthalmic Solution 1Drop(s) Left EYE at bedtime  pantoprazole    Tablet 40milliGRAM(s) Oral before breakfast  atorvastatin 5milliGRAM(s) Oral at bedtime  piperacillin/tazobactam IVPB. 3.375Gram(s) IV Intermittent every 8 hours  insulin lispro (HumaLOG) corrective regimen sliding scale  SubCutaneous three times a day before meals  insulin lispro (HumaLOG) corrective regimen sliding scale  SubCutaneous at bedtime  dextrose 5%. 1000milliLiter(s) IV Continuous <Continuous>  dextrose 50% Injectable 12.5Gram(s) IV Push once  dextrose 50% Injectable 25Gram(s) IV Push once  dextrose 50% Injectable 25Gram(s) IV Push once  vancomycin  IVPB 750milliGRAM(s) IV Intermittent every 24 hours  lactobacillus acidophilus 1Tablet(s) Oral daily  heparin  Injectable 5000Unit(s) SubCutaneous every 12 hours    MEDICATIONS  (PRN):  dextrose Gel 1Dose(s) Oral once PRN Blood Glucose LESS THAN 70 milliGRAM(s)/deciliter  glucagon  Injectable 1milliGRAM(s) IntraMuscular once PRN Glucose LESS THAN 70 milligrams/deciliter      FAMILY HISTORY:      SOCIAL HISTORY:  ***    REVIEW OF SYSTEM:  Pertinent items are noted in HPI.  Constitutional negative for chills, fevers, sweats and weight loss  Eyes:  negative for color blindness and visual disturbance  Ears, nose, mouth,   throat, and face:  negative for epistaxis, nasal congestion, sore throat and   tinnitus  Neck: negative for enlargement, pain and difficulty in swallowing  Respiratory: negative for cough, dyspnea on exertion, pleuritic chest pain  and wheezing  Cardiovascular:  CAD, VT/ICD,PVD  Gastrointestinal: negative for abdominal pain, diarrhea, nausea and vomiting  Genitourinary: negative for dysuria, frequency and urinary incontinence  Skin:  negative for redness, rash, pruritus, swelling, dryness and   fissures  Hematologic/lymphatic: negative for bleeding and easy bruising  Musculoskeletal: negative for arthralgias, back pain and muscle weakness  Neurological: negative for dizziness, headaches, seizures and tremors  Behavioral/Psych:  negative for mood change, depression, suicidal attempts  Endocrine: negative for blurry vision, polydipsia and polyuria,   diaphoresis,   Allergic/Immunologic: negative for anaphylaxis, angioedema and urticaria    Vital Signs Last 24 Hrs  T(C): 36.3, Max: 36.9 (04-26 @ 00:04)  T(F): 97.3, Max: 98.4 (04-26 @ 00:04)  HR: 70 (64 - 72)  BP: 125/61 (108/52 - 128/67)  BP(mean): --  RR: 16 (16 - 17)  SpO2: 100% (97% - 100%)    I&O's Summary  I & Os for 24h ending 26 Apr 2017 07:00  =============================================  IN: 600 ml / OUT: 0 ml / NET: 600 ml    I & Os for current day (as of 26 Apr 2017 16:26)  =============================================  IN: 100 ml / OUT: 0 ml / NET: 100 ml    PHYSICAL EXAM  General Appearance: cooperative, no acute distress,   HEENT: PERRL, conjunctiva clear, EOM's intact, non injected pharynx, no exudate, TM   normal  Neck: Supple, , no adenopathy, thyroid: not enlarged, no carotid bruit or JVD  Back: Symmetric, no  tenderness,no soft tissue tenderness  Lungs: Clear to auscultation bilaterally,  Heart: Regular rate and rhythm, S1, S2 normal, 2/6 syst murmur apex to axilla  Abdomen: Soft, non-tender, bowel sounds active , no hepatosplenomegaly  Extremities: no cyanosis or edema, no joint swelling. Cellulitis right hallux.NoPT?DP  Skin: Skin color, texture normal, no rashes   Neurologic: Alert and oriented X3 , cranial nerves intact, sensory and motor normal,        INTERPRETATION OF TELEMETRY:    ECG:sinus 82/minLIVCD,  NSST-T changes        LABS:                          11.9   9.5   )-----------( 126      ( 25 Apr 2017 06:08 )             37.2     04-26    142  |  109<H>  |  29<H>  ----------------------------<  104<H>  3.7   |  22  |  1.88<H>    Ca    9.0      26 Apr 2017 06:23              PT/INR - ( 26 Apr 2017 06:23 )   PT: 14.8 sec;   INR: 1.36 ratio                   RADIOLOGY & ADDITIONAL STUDIES:    IMPRESSION:1. PVD with osteomyelitis of right great toe.  2.CAD, s/p CABG- stable  3.History of venticular tachycardia/ICD-   4.DM  5.History of atrial flutter/fibrillation- now in sinus rhythm    PLAN:continue lisinopril,mexiletine, carvedilol,ASA. No further cardiac testing needed before surgery.  No contraindication to surgery. We will follow.
History and Physical:   Source of Information	Chart(s), Patient	  Outpatient Providers	Dr mary sharma-PMD	    Language:  · Patient/Family of Limited English Proficiency	No	      History of Present Illness:  Chief Complaint/Reason for Admission: right great toe redness and swelling	  History of Present Illness: 	   	  77 y.o. male with PMH VT s/p AICD, AFib on AC, CAD s/p CABGx2, DM2, gout, glaucoma,  HLD, chronic systolic  CHF, PVD, CKD 2-3,hx of vasectomy  ,S/p  R femoral endarterectomy feb /6/2017   presents to ED sent from Dr. Gamble's office for IV abx c/o pain, necrosis, erythema of infected first metatarsal on right foot. +Drainage. Has hx of recurrent infection in this toe.   Patient was seen in ED by podiatry dr Gamble Blood cx were drawn and zosyn and vanco admisnitered in ED EKG- NSR,nonspecific intraventricular block,no significant  ST/T chnages as compared to EKG done aug/2016  ROS- denies fever ,chills,chest pain ,SOB,headache,palpitations,abdominal pain,dysuria,flank pain ,vomiting,nausea ,diarrhea PMH: as above PSH:  CABG, AICD placement, vasectomy, R femoral endarterectomy  Allergy:   NKDA SH:  ex smoker, quit about 30 years ago, denies drinking alcohol 	  	  	  	  	  	  	  	  	        Review of Systems:  Other Review of Systems: All other review of systems negative, except as noted in HPI	      Allergies and Intolerances:        Allergies:  	No Known Allergies:     Home Medications:   * Patient Currently Takes Medications as of 20-Apr-2017 21:05 documented in Prescription Writer  · 	mexiletine 150 mg oral capsule: 1 cap(s) orally every 12 hours, Last Dose Taken:    · 	latanoprost 0.005% ophthalmic solution: 1 drop(s) in the left eye once a day (at bedtime), Last Dose Taken:    · 	dorzolamide-timolol 2%-0.5% preservative-free ophthalmic solution: 1 drop(s) in the left eye 2 times a day, Last Dose Taken:    · 	allopurinol 100 mg oral tablet: 1 tab(s) orally once a day (at bedtime), Last Dose Taken:    · 	Coreg 25 mg oral tablet: 1 tab(s) orally 2 times a day, Last Dose Taken:    · 	digoxin 125 mcg (0.125 mg) oral tablet: 1 tab(s) orally once a day (at bedtime), Last Dose Taken:    · 	furosemide 40 mg oral tablet: 1 tablet  orally once a day, As Needed, Last Dose Taken:    · 	warfarin 2.5 mg oral tablet: 1 tab(s) orally on Saturday and Monday , Last Dose Taken:    · 	warfarin 5 mg oral tablet: 1 tab(s) orally once a day on Sunday, Tuesday, Wednesday, Thursday, and Friday, Last Dose Taken:    · 	atorvastatin: 5 milligram(s) orally every other day, Last Dose Taken:    · 	quinapril 5 mg oral tablet: 1 tab(s) orally once a day, Last Dose Taken:    · 	pantoprazole 40 mg oral delayed release tablet: 1 tab(s) orally once a day, Last Dose Taken:    · 	potassium chloride 10 mEq oral capsule, extended release:  1 tablet orally , As Needed, Last Dose Taken:    · 	Vitamin D3 2000 intl units oral capsule: 1 cap(s) orally once a day, Last Dose Taken:    · 	Flax Seed Oil oral capsule: 1 cap(s) orally once a day, Last Dose Taken:    · 	aspirin 81 mg oral delayed release tablet: 1 tab(s) orally once a day (at bedtime), Last Dose Taken:      Patient History:   Tobacco Screening:  · Core Measure Site	No	  · Has the patient used tobacco in the past 30 days?	No	    Risk Assessment:   Present on Admission:  Deep Venous Thrombosis	no	  Pulmonary Embolus	no	    Heart Failure:  Does this patient have a history of or has been diagnosed with heart failure? yes.     LV Function Assessment (LVS function was evaluated before arrival and/or during hospitalization) yes.     Is the Ejection Fraction >40% ? no.     moderately reduced LV function.     Ejection Fraction 40 %.     Are there any contraindications to ACEI/ARB therapy? No.      Physical Exam:  Physical Exam: PHYSICAL EXAM:  Daily Height in cm: 175.26 (20 Apr 2017 18:52)   Daily   ICU Vital Signs Last 24 Hrs T(C): 36.5, Max: 36.8 (04-20 @ 18:54) T(F): 97.7, Max: 98.3 (04-20 @ 18:54) HR: 73 (73 - 88) BP: 150/74 (150/74 - 165/80) BP(mean): 100 (100 - 100) ABP: -- ABP(mean): -- RR: 20 (18 - 20) SpO2: 99% (97% - 99%)   Constitutional: Well appearing,NAD HEENT: Atraumatic, ENRIQUETA, Normal, No congestion Respiratory: Breath Sounds normal, no rhonchi/wheeze Cardiovascular: N S1S2; DANTE present Gastrointestinal: Abdomen soft, non tender, Bowel Ssounds present Extremities: No B/l lower extremity and pedal edema ,except small area of erythema and swelling on great right toe  surrounding black eschar as described next  , Right great toe lysis of nail with dorsal black eschar,with surrounding 1st metatarsal skin erythema with mild swelling and tenderness,decreased sensation to tight touch; well healed incision R groin without evidence of infection Neurological: AAO x 3, no gross focal motor deficits  Back: No CVA tenderness  Musculoskeletal: non tender Breasts: Deferred Genitourinary: deferred Rectal: Deferred	      Labs and Results:  Labs, Radiology, Cardiology, and Other Results: 13.8  11.6  )-----------( 146      ( 20 Apr 2017 20:19 )            40.0    CBC Full  -  ( 20 Apr 2017 20:19 ) WBC Count : 11.6 K/uL Hemoglobin : 13.8 g/dL Hematocrit : 40.0 % Platelet Count - Automated : 146 K/uL Mean Cell Volume : 86.7 fl Mean Cell Hemoglobin : 29.9 pg Mean Cell Hemoglobin Concentration : 34.5 gm/dL Auto Neutrophil # : 8.7 K/uL Auto Lymphocyte # : 1.7 K/uL Auto Monocyte # : 0.8 K/uL Auto Eosinophil # : 0.3 K/uL Auto Basophil # : 0.1 K/uL Auto Neutrophil % : 75.3 % Auto Lymphocyte % : 14.3 % Auto Monocyte % : 6.9 % Auto Eosinophil % : 3.0 % Auto Basophil % : 0.5 %   04-20  137  |  103  |  31<H> ----------------------------<  226<H> 4.6   |  25  |  1.91<H>  Ca    9.3      20 Apr 2017 20:19  TPro  7.1  /  Alb  3.8  /  TBili  0.6  /  DBili  x   /  AST  23  /  ALT  35  /  AlkPhos  63  04-20   LIVER FUNCTIONS - ( 20 Apr 2017 20:19 ) Alb: 3.8 g/dL / Pro: 7.1 gm/dL / ALK PHOS: 63 U/L / ALT: 35 U/L / AST: 23 U/L / GGT: x	    Assessment and Plan:   Assessment:  · Assessment		   	  77 y.o. male with PMH VT s/p AICD, AFib on AC, CAD s/p CABGx2, DM2, gout, glaucoma,  HLD, chronic systolic  CHF, PVD, CKD 2-3,hx of vasectomy  ,S/p  R femoral endarterectomy feb /6/2017   presents to ED sent from Dr. Gamble's office for IV abx c/o pain, necrosis, erythema of infected first metatarsal on right foot. +Drainage. Has hx of recurrent infection in this toe. Denies fever. NKDA.  Patient was seen in ED by podiatry dr Gamble Blood cx were drawn and zosyn and vanco admisnitered in ED  Toe surgery as per Dr. Corona 	      Problem/Plan - 1:  ·  Problem: Toe infection.  Plan: Cellulitis right great toe r/o Osteomyelitis  Admit to Med surg  Iv zosyn  Iv vanco x1   blood cx  xray foot r/o osteomyelitis (f/u official report)  Unable to get MRI due to AICD  Consider bone scan if xray equivocal  ID consult     Problem/Plan - 2:  ·  Problem: Afib.  Plan: hx of paroxysmal Afib,currently sinus rhythm  continue anticoagulation with warfarin  continue rate control with digoxin and coreg.     Problem/Plan - 3:  ·  Problem: CKD (chronic kidney disease).  Plan: CKD with possible mild KELLI- prerenal    f/u BMP in am  hold quinapril for tonight and evaluate BMP(BUN/Cr) in am  avoid  IVf for now due to hx of chronic systolic heart failureCKD with possible mild KELLI- prerenal  s/p IVF in ED  f/u BMP in am  hold quinapril for tonight and evaluate BMP(BUN/Cr) in am  avoid further IVf for now due to hx of chronic systolic heart failure.     Problem/Plan - 4:  ·  Problem: Heart failure.  Plan: chronic systolic heart failure -Not acutely decompensated clinically   stable  continue coreg,digoxin  resume quinal pril from am  continue mexiletine   lasix prn - hold lasix for now -monitor BMP  DVT prophylaxis -on coumadin  hx of DM 2 -controlled off medschronic systolic heart failure -Not acutely decompensated clinically   stable  continue coreg,digoxin  resume quinal pril from am  continue mexiletine   DVT prophylaxis -on coumadin  hx of DM 2 -controlled off meds.

## 2017-04-27 ENCOUNTER — RESULT REVIEW (OUTPATIENT)
Age: 77
End: 2017-04-27

## 2017-04-27 LAB
ALBUMIN SERPL ELPH-MCNC: 3.4 G/DL — SIGNIFICANT CHANGE UP (ref 3.3–5)
ALP SERPL-CCNC: 47 U/L — SIGNIFICANT CHANGE UP (ref 40–120)
ALT FLD-CCNC: 20 U/L — SIGNIFICANT CHANGE UP (ref 12–78)
ANION GAP SERPL CALC-SCNC: 11 MMOL/L — SIGNIFICANT CHANGE UP (ref 5–17)
AST SERPL-CCNC: 14 U/L — LOW (ref 15–37)
BASOPHILS # BLD AUTO: 0.1 K/UL — SIGNIFICANT CHANGE UP (ref 0–0.2)
BASOPHILS NFR BLD AUTO: 0.9 % — SIGNIFICANT CHANGE UP (ref 0–2)
BILIRUB SERPL-MCNC: 0.7 MG/DL — SIGNIFICANT CHANGE UP (ref 0.2–1.2)
BUN SERPL-MCNC: 33 MG/DL — HIGH (ref 7–23)
CALCIUM SERPL-MCNC: 9 MG/DL — SIGNIFICANT CHANGE UP (ref 8.5–10.1)
CHLORIDE SERPL-SCNC: 108 MMOL/L — SIGNIFICANT CHANGE UP (ref 96–108)
CO2 SERPL-SCNC: 22 MMOL/L — SIGNIFICANT CHANGE UP (ref 22–31)
CREAT SERPL-MCNC: 1.77 MG/DL — HIGH (ref 0.5–1.3)
EOSINOPHIL # BLD AUTO: 0.3 K/UL — SIGNIFICANT CHANGE UP (ref 0–0.5)
EOSINOPHIL NFR BLD AUTO: 3.6 % — SIGNIFICANT CHANGE UP (ref 0–6)
GLUCOSE SERPL-MCNC: 103 MG/DL — HIGH (ref 70–99)
GRAM STN FLD: SIGNIFICANT CHANGE UP
HCT VFR BLD CALC: 36.3 % — LOW (ref 39–50)
HGB BLD-MCNC: 11.7 G/DL — LOW (ref 13–17)
LYMPHOCYTES # BLD AUTO: 1.4 K/UL — SIGNIFICANT CHANGE UP (ref 1–3.3)
LYMPHOCYTES # BLD AUTO: 16.7 % — SIGNIFICANT CHANGE UP (ref 13–44)
MCHC RBC-ENTMCNC: 29 PG — SIGNIFICANT CHANGE UP (ref 27–34)
MCHC RBC-ENTMCNC: 32.2 GM/DL — SIGNIFICANT CHANGE UP (ref 32–36)
MCV RBC AUTO: 90.1 FL — SIGNIFICANT CHANGE UP (ref 80–100)
MONOCYTES # BLD AUTO: 0.6 K/UL — SIGNIFICANT CHANGE UP (ref 0–0.9)
MONOCYTES NFR BLD AUTO: 6.6 % — SIGNIFICANT CHANGE UP (ref 2–14)
NEUTROPHILS # BLD AUTO: 6.2 K/UL — SIGNIFICANT CHANGE UP (ref 1.8–7.4)
NEUTROPHILS NFR BLD AUTO: 72.2 % — SIGNIFICANT CHANGE UP (ref 43–77)
PLATELET # BLD AUTO: 128 K/UL — LOW (ref 150–400)
POTASSIUM SERPL-MCNC: 3.9 MMOL/L — SIGNIFICANT CHANGE UP (ref 3.5–5.3)
POTASSIUM SERPL-SCNC: 3.9 MMOL/L — SIGNIFICANT CHANGE UP (ref 3.5–5.3)
PROT SERPL-MCNC: 6.4 GM/DL — SIGNIFICANT CHANGE UP (ref 6–8.3)
RBC # BLD: 4.03 M/UL — LOW (ref 4.2–5.8)
RBC # FLD: 15.7 % — HIGH (ref 10.3–14.5)
SODIUM SERPL-SCNC: 141 MMOL/L — SIGNIFICANT CHANGE UP (ref 135–145)
SPECIMEN SOURCE: SIGNIFICANT CHANGE UP
WBC # BLD: 8.6 K/UL — SIGNIFICANT CHANGE UP (ref 3.8–10.5)
WBC # FLD AUTO: 8.6 K/UL — SIGNIFICANT CHANGE UP (ref 3.8–10.5)

## 2017-04-27 PROCEDURE — 73630 X-RAY EXAM OF FOOT: CPT | Mod: 26,LT

## 2017-04-27 PROCEDURE — 88311 DECALCIFY TISSUE: CPT | Mod: 26

## 2017-04-27 PROCEDURE — 88305 TISSUE EXAM BY PATHOLOGIST: CPT | Mod: 26

## 2017-04-27 RX ORDER — FENTANYL CITRATE 50 UG/ML
50 INJECTION INTRAVENOUS
Qty: 0 | Refills: 0 | Status: DISCONTINUED | OUTPATIENT
Start: 2017-04-27 | End: 2017-04-28

## 2017-04-27 RX ORDER — PIPERACILLIN AND TAZOBACTAM 4; .5 G/20ML; G/20ML
3.38 INJECTION, POWDER, LYOPHILIZED, FOR SOLUTION INTRAVENOUS EVERY 8 HOURS
Qty: 0 | Refills: 0 | Status: DISCONTINUED | OUTPATIENT
Start: 2017-04-27 | End: 2017-04-28

## 2017-04-27 RX ORDER — DEXTROSE 50 % IN WATER 50 %
1 SYRINGE (ML) INTRAVENOUS ONCE
Qty: 0 | Refills: 0 | Status: DISCONTINUED | OUTPATIENT
Start: 2017-04-27 | End: 2017-04-29

## 2017-04-27 RX ORDER — ALLOPURINOL 300 MG
100 TABLET ORAL
Qty: 0 | Refills: 0 | Status: DISCONTINUED | OUTPATIENT
Start: 2017-04-27 | End: 2017-04-29

## 2017-04-27 RX ORDER — CARVEDILOL PHOSPHATE 80 MG/1
25 CAPSULE, EXTENDED RELEASE ORAL EVERY 12 HOURS
Qty: 0 | Refills: 0 | Status: DISCONTINUED | OUTPATIENT
Start: 2017-04-27 | End: 2017-04-29

## 2017-04-27 RX ORDER — DOCUSATE SODIUM 100 MG
200 CAPSULE ORAL AT BEDTIME
Qty: 0 | Refills: 0 | Status: DISCONTINUED | OUTPATIENT
Start: 2017-04-27 | End: 2017-04-29

## 2017-04-27 RX ORDER — LISINOPRIL 2.5 MG/1
5 TABLET ORAL AT BEDTIME
Qty: 0 | Refills: 0 | Status: DISCONTINUED | OUTPATIENT
Start: 2017-04-27 | End: 2017-04-29

## 2017-04-27 RX ORDER — ATORVASTATIN CALCIUM 80 MG/1
5 TABLET, FILM COATED ORAL AT BEDTIME
Qty: 0 | Refills: 0 | Status: DISCONTINUED | OUTPATIENT
Start: 2017-04-27 | End: 2017-04-29

## 2017-04-27 RX ORDER — MEXILETINE HYDROCHLORIDE 150 MG/1
150 CAPSULE ORAL EVERY 12 HOURS
Qty: 0 | Refills: 0 | Status: DISCONTINUED | OUTPATIENT
Start: 2017-04-27 | End: 2017-04-29

## 2017-04-27 RX ORDER — LACTOBACILLUS ACIDOPHILUS 100MM CELL
1 CAPSULE ORAL DAILY
Qty: 0 | Refills: 0 | Status: DISCONTINUED | OUTPATIENT
Start: 2017-04-27 | End: 2017-04-29

## 2017-04-27 RX ORDER — PANTOPRAZOLE SODIUM 20 MG/1
40 TABLET, DELAYED RELEASE ORAL
Qty: 0 | Refills: 0 | Status: DISCONTINUED | OUTPATIENT
Start: 2017-04-27 | End: 2017-04-29

## 2017-04-27 RX ORDER — DEXTROSE 50 % IN WATER 50 %
25 SYRINGE (ML) INTRAVENOUS ONCE
Qty: 0 | Refills: 0 | Status: DISCONTINUED | OUTPATIENT
Start: 2017-04-27 | End: 2017-04-29

## 2017-04-27 RX ORDER — SODIUM CHLORIDE 9 MG/ML
1000 INJECTION INTRAMUSCULAR; INTRAVENOUS; SUBCUTANEOUS
Qty: 0 | Refills: 0 | Status: DISCONTINUED | OUTPATIENT
Start: 2017-04-27 | End: 2017-04-27

## 2017-04-27 RX ORDER — VANCOMYCIN HCL 1 G
750 VIAL (EA) INTRAVENOUS EVERY 24 HOURS
Qty: 0 | Refills: 0 | Status: DISCONTINUED | OUTPATIENT
Start: 2017-04-27 | End: 2017-04-28

## 2017-04-27 RX ORDER — DIGOXIN 250 MCG
0.12 TABLET ORAL AT BEDTIME
Qty: 0 | Refills: 0 | Status: DISCONTINUED | OUTPATIENT
Start: 2017-04-27 | End: 2017-04-29

## 2017-04-27 RX ORDER — WARFARIN SODIUM 2.5 MG/1
5 TABLET ORAL DAILY
Qty: 0 | Refills: 0 | Status: DISCONTINUED | OUTPATIENT
Start: 2017-04-27 | End: 2017-04-27

## 2017-04-27 RX ORDER — ASPIRIN/CALCIUM CARB/MAGNESIUM 324 MG
81 TABLET ORAL AT BEDTIME
Qty: 0 | Refills: 0 | Status: DISCONTINUED | OUTPATIENT
Start: 2017-04-27 | End: 2017-04-29

## 2017-04-27 RX ORDER — DEXTROSE 50 % IN WATER 50 %
12.5 SYRINGE (ML) INTRAVENOUS ONCE
Qty: 0 | Refills: 0 | Status: DISCONTINUED | OUTPATIENT
Start: 2017-04-27 | End: 2017-04-29

## 2017-04-27 RX ORDER — HEPARIN SODIUM 5000 [USP'U]/ML
5000 INJECTION INTRAVENOUS; SUBCUTANEOUS EVERY 12 HOURS
Qty: 0 | Refills: 0 | Status: DISCONTINUED | OUTPATIENT
Start: 2017-04-27 | End: 2017-04-29

## 2017-04-27 RX ORDER — INSULIN LISPRO 100/ML
VIAL (ML) SUBCUTANEOUS AT BEDTIME
Qty: 0 | Refills: 0 | Status: DISCONTINUED | OUTPATIENT
Start: 2017-04-27 | End: 2017-04-29

## 2017-04-27 RX ORDER — GLUCAGON INJECTION, SOLUTION 0.5 MG/.1ML
1 INJECTION, SOLUTION SUBCUTANEOUS ONCE
Qty: 0 | Refills: 0 | Status: DISCONTINUED | OUTPATIENT
Start: 2017-04-27 | End: 2017-04-29

## 2017-04-27 RX ORDER — FENTANYL CITRATE 50 UG/ML
50 INJECTION INTRAVENOUS
Qty: 0 | Refills: 0 | Status: DISCONTINUED | OUTPATIENT
Start: 2017-04-27 | End: 2017-04-27

## 2017-04-27 RX ORDER — MORPHINE SULFATE 50 MG/1
2 CAPSULE, EXTENDED RELEASE ORAL ONCE
Qty: 0 | Refills: 0 | Status: DISCONTINUED | OUTPATIENT
Start: 2017-04-27 | End: 2017-04-27

## 2017-04-27 RX ORDER — OXYCODONE HYDROCHLORIDE 5 MG/1
5 TABLET ORAL EVERY 4 HOURS
Qty: 0 | Refills: 0 | Status: DISCONTINUED | OUTPATIENT
Start: 2017-04-27 | End: 2017-04-29

## 2017-04-27 RX ORDER — ACETAMINOPHEN 500 MG
1000 TABLET ORAL ONCE
Qty: 0 | Refills: 0 | Status: DISCONTINUED | OUTPATIENT
Start: 2017-04-27 | End: 2017-04-28

## 2017-04-27 RX ORDER — ONDANSETRON 8 MG/1
4 TABLET, FILM COATED ORAL ONCE
Qty: 0 | Refills: 0 | Status: DISCONTINUED | OUTPATIENT
Start: 2017-04-27 | End: 2017-04-29

## 2017-04-27 RX ORDER — LATANOPROST 0.05 MG/ML
1 SOLUTION/ DROPS OPHTHALMIC; TOPICAL AT BEDTIME
Qty: 0 | Refills: 0 | Status: DISCONTINUED | OUTPATIENT
Start: 2017-04-27 | End: 2017-04-29

## 2017-04-27 RX ORDER — INSULIN LISPRO 100/ML
VIAL (ML) SUBCUTANEOUS
Qty: 0 | Refills: 0 | Status: DISCONTINUED | OUTPATIENT
Start: 2017-04-27 | End: 2017-04-29

## 2017-04-27 RX ORDER — SODIUM CHLORIDE 9 MG/ML
1000 INJECTION INTRAMUSCULAR; INTRAVENOUS; SUBCUTANEOUS
Qty: 0 | Refills: 0 | Status: DISCONTINUED | OUTPATIENT
Start: 2017-04-27 | End: 2017-04-28

## 2017-04-27 RX ORDER — DORZOLAMIDE HYDROCHLORIDE TIMOLOL MALEATE 20; 5 MG/ML; MG/ML
1 SOLUTION/ DROPS OPHTHALMIC
Qty: 0 | Refills: 0 | Status: DISCONTINUED | OUTPATIENT
Start: 2017-04-27 | End: 2017-04-29

## 2017-04-27 RX ADMIN — ATORVASTATIN CALCIUM 5 MILLIGRAM(S): 80 TABLET, FILM COATED ORAL at 00:02

## 2017-04-27 RX ADMIN — PIPERACILLIN AND TAZOBACTAM 25 GRAM(S): 4; .5 INJECTION, POWDER, LYOPHILIZED, FOR SOLUTION INTRAVENOUS at 07:56

## 2017-04-27 RX ADMIN — CARVEDILOL PHOSPHATE 25 MILLIGRAM(S): 80 CAPSULE, EXTENDED RELEASE ORAL at 22:29

## 2017-04-27 RX ADMIN — DORZOLAMIDE HYDROCHLORIDE TIMOLOL MALEATE 1 DROP(S): 20; 5 SOLUTION/ DROPS OPHTHALMIC at 22:31

## 2017-04-27 RX ADMIN — Medication 100 MILLIGRAM(S): at 22:30

## 2017-04-27 RX ADMIN — Medication 3: at 22:33

## 2017-04-27 RX ADMIN — OXYCODONE HYDROCHLORIDE 5 MILLIGRAM(S): 5 TABLET ORAL at 19:50

## 2017-04-27 RX ADMIN — MORPHINE SULFATE 2 MILLIGRAM(S): 50 CAPSULE, EXTENDED RELEASE ORAL at 20:49

## 2017-04-27 RX ADMIN — Medication 200 MILLIGRAM(S): at 22:32

## 2017-04-27 RX ADMIN — OXYCODONE HYDROCHLORIDE 5 MILLIGRAM(S): 5 TABLET ORAL at 21:30

## 2017-04-27 RX ADMIN — ATORVASTATIN CALCIUM 5 MILLIGRAM(S): 80 TABLET, FILM COATED ORAL at 22:30

## 2017-04-27 RX ADMIN — CARVEDILOL PHOSPHATE 25 MILLIGRAM(S): 80 CAPSULE, EXTENDED RELEASE ORAL at 08:01

## 2017-04-27 RX ADMIN — MEXILETINE HYDROCHLORIDE 150 MILLIGRAM(S): 150 CAPSULE ORAL at 08:01

## 2017-04-27 RX ADMIN — LATANOPROST 1 DROP(S): 0.05 SOLUTION/ DROPS OPHTHALMIC; TOPICAL at 22:32

## 2017-04-27 RX ADMIN — PIPERACILLIN AND TAZOBACTAM 25 GRAM(S): 4; .5 INJECTION, POWDER, LYOPHILIZED, FOR SOLUTION INTRAVENOUS at 13:47

## 2017-04-27 RX ADMIN — SODIUM CHLORIDE 50 MILLILITER(S): 9 INJECTION INTRAMUSCULAR; INTRAVENOUS; SUBCUTANEOUS at 00:06

## 2017-04-27 RX ADMIN — LISINOPRIL 5 MILLIGRAM(S): 2.5 TABLET ORAL at 22:32

## 2017-04-27 RX ADMIN — DORZOLAMIDE HYDROCHLORIDE TIMOLOL MALEATE 1 DROP(S): 20; 5 SOLUTION/ DROPS OPHTHALMIC at 07:57

## 2017-04-27 RX ADMIN — PIPERACILLIN AND TAZOBACTAM 25 GRAM(S): 4; .5 INJECTION, POWDER, LYOPHILIZED, FOR SOLUTION INTRAVENOUS at 22:33

## 2017-04-27 RX ADMIN — Medication 81 MILLIGRAM(S): at 22:30

## 2017-04-27 RX ADMIN — Medication 150 MILLIGRAM(S): at 18:36

## 2017-04-27 RX ADMIN — Medication 0.12 MILLIGRAM(S): at 22:31

## 2017-04-27 NOTE — PROGRESS NOTE ADULT - SUBJECTIVE AND OBJECTIVE BOX
Patient is a 77y old  Male who presents with a chief complaint of infected Right  great toe x 3 months (20 Apr 2017 22:41)      HPI:  77 y.o. male with PMH VT s/p AICD, AFib on AC, CAD s/p CABGx2, DM2, gout, glaucoma,  HLD, chronic systolic  CHF, PVD, CKD 2-3,hx of vasectomy  ,S/p  R femoral endarterectomy feb /6/2017   presents to ED sent from Dr. Gamble's office for IV abx c/o pain, necrosis, erythema of infected first metatarsal on right foot. +Drainage. Has hx of recurrent infection in this toe. Had bypass in [place] with Dr. Wesley which temporarily helped sx but sx have been worsening recently. States usually pulse is only detected with doppler.  Denies fever. NKDA.  Patient was seen in ED by podiatry dr Gamble Blood cx were drawn and zosyn and vanco admisnitered in ED EKG- NSR,nonspecific intraventricular block,no significant  ST/T chnages as compared to EKG done aug/2016  ROS- denies fever ,chills,chest pain ,SOB,headache,palpitations,abdominal pain,dysuria,flank pain ,vomiting,nausea ,diarrhea PMH: as above PSH:  CABG, AICD placement, vasectomy, R femoral endarterectomy  Allergy:   NKDA SH:  ex smoker, quit about 30 years ago, denies drinking alcohol (20 Apr 2017 21:57)  4/27- no new complaints    PAST MEDICAL & SURGICAL HISTORY:  CAD (coronary artery disease)  Diabetes: controlled by diet  Glaucoma  Afib  Hyperlipidemia  PVD (peripheral vascular disease) with claudication  CKD (chronic kidney disease)  Hypertension  Coronary artery disease  Diabetes mellitus  Heart failure  H/O vasectomy  S/P CABG x 2  AICD (automatic cardioverter/defibrillator) present: 2005 and replaced 2015        MEDICATIONS  (STANDING):  aspirin enteric coated 81milliGRAM(s) Oral at bedtime  lisinopril 5milliGRAM(s) Oral at bedtime  digoxin     Tablet 0.125milliGRAM(s) Oral at bedtime  mexiletine 150milliGRAM(s) Oral every 12 hours  allopurinol 100milliGRAM(s) Oral after dinner  carvedilol 25milliGRAM(s) Oral every 12 hours  dorzolamide 2%/timolol 0.5% Ophthalmic Solution 1Drop(s) Left EYE two times a day  latanoprost 0.005% Ophthalmic Solution 1Drop(s) Left EYE at bedtime  pantoprazole    Tablet 40milliGRAM(s) Oral before breakfast  atorvastatin 5milliGRAM(s) Oral at bedtime  piperacillin/tazobactam IVPB. 3.375Gram(s) IV Intermittent every 8 hours  insulin lispro (HumaLOG) corrective regimen sliding scale  SubCutaneous three times a day before meals  insulin lispro (HumaLOG) corrective regimen sliding scale  SubCutaneous at bedtime  dextrose 5%. 1000milliLiter(s) IV Continuous <Continuous>  dextrose 50% Injectable 12.5Gram(s) IV Push once  dextrose 50% Injectable 25Gram(s) IV Push once  dextrose 50% Injectable 25Gram(s) IV Push once  vancomycin  IVPB 750milliGRAM(s) IV Intermittent every 24 hours  lactobacillus acidophilus 1Tablet(s) Oral daily  heparin  Injectable 5000Unit(s) SubCutaneous every 12 hours  sodium chloride 0.9%. 1000milliLiter(s) IV Continuous <Continuous>    MEDICATIONS  (PRN):  dextrose Gel 1Dose(s) Oral once PRN Blood Glucose LESS THAN 70 milliGRAM(s)/deciliter  glucagon  Injectable 1milliGRAM(s) IntraMuscular once PRN Glucose LESS THAN 70 milligrams/deciliter          Vital Signs Last 24 Hrs  T(C): 36.3, Max: 36.3 (04-26 @ 11:27)  T(F): 97.4, Max: 97.4 (04-27 @ 05:24)  HR: 76 (70 - 78)  BP: 137/55 (120/68 - 137/55)  BP(mean): --  RR: 18 (16 - 18)  SpO2: 100% (100% - 100%)    I&O's Summary    I & Os for current day (as of 27 Apr 2017 08:01)  =============================================  IN: 250 ml / OUT: 0 ml / NET: 250 ml      PHYSICAL EXAM  General Appearance:comfortable  HEENT:   Neck:   Back:   Lungs: clear  Heart: S1S2 2/5 syst murmur apax and axilla  Abdomen:   Extremities: bandage r great toe      INTERPRETATION OF TELEMETRY:    ECG:        LABS:  Patient is a 77y old  Male who presents with a chief complaint of infected Right  great toe x 3 months (20 Apr 2017 22:41)      HPI:  77 y.o. male with PMH VT s/p AICD, AFib on AC, CAD s/p CABGx2, DM2, gout, glaucoma,  HLD, chronic systolic  CHF, PVD, CKD 2-3,hx of vasectomy  ,S/p  R femoral endarterectomy feb /6/2017   presents to ED sent from Dr. Gamble's office for IV abx c/o pain, necrosis, erythema of infected first metatarsal on right foot. +Drainage. Has hx of recurrent infection in this toe. Had bypass in [facility] with Dr. Wesley which temporarily helped sx but sx have been worsening recently. States usually pulse is only detected with doppler.  Denies fever. NKDA.  Patient was seen in ED by podiatry dr Gamble Blood cx were drawn and zosyn and vanco admisnitered in ED EKG- NSR,nonspecific intraventricular block,no significant  ST/T chnages as compared to EKG done aug/2016  ROS- denies fever ,chills,chest pain ,SOB,headache,palpitations,abdominal pain,dysuria,flank pain ,vomiting,nausea ,diarrhea PMH: as above PSH:  CABG, AICD placement, vasectomy, R femoral endarterectomy  Allergy:   NKDA SH:  ex smoker, quit about 30 years ago, denies drinking alcohol (20 Apr 2017 21:57)      PAST MEDICAL & SURGICAL HISTORY:  CAD (coronary artery disease)  Diabetes: controlled by diet  Glaucoma  Afib  Hyperlipidemia  PVD (peripheral vascular disease) with claudication  CKD (chronic kidney disease)  Hypertension  Coronary artery disease  Diabetes mellitus  Heart failure  H/O vasectomy  S/P CABG x 2  AICD (automatic cardioverter/defibrillator) present: 2005 and replaced 2015      PREVIOUS DIAGNOSTIC TESTING:      ECHO  FINDINGS:    STRESS  FINDINGS:    CATHETERIZATION  FINDINGS:    MEDICATIONS  (STANDING):  aspirin enteric coated 81milliGRAM(s) Oral at bedtime  lisinopril 5milliGRAM(s) Oral at bedtime  digoxin     Tablet 0.125milliGRAM(s) Oral at bedtime  mexiletine 150milliGRAM(s) Oral every 12 hours  allopurinol 100milliGRAM(s) Oral after dinner  carvedilol 25milliGRAM(s) Oral every 12 hours  dorzolamide 2%/timolol 0.5% Ophthalmic Solution 1Drop(s) Left EYE two times a day  latanoprost 0.005% Ophthalmic Solution 1Drop(s) Left EYE at bedtime  pantoprazole    Tablet 40milliGRAM(s) Oral before breakfast  atorvastatin 5milliGRAM(s) Oral at bedtime  piperacillin/tazobactam IVPB. 3.375Gram(s) IV Intermittent every 8 hours  insulin lispro (HumaLOG) corrective regimen sliding scale  SubCutaneous three times a day before meals  insulin lispro (HumaLOG) corrective regimen sliding scale  SubCutaneous at bedtime  dextrose 5%. 1000milliLiter(s) IV Continuous <Continuous>  dextrose 50% Injectable 12.5Gram(s) IV Push once  dextrose 50% Injectable 25Gram(s) IV Push once  dextrose 50% Injectable 25Gram(s) IV Push once  vancomycin  IVPB 750milliGRAM(s) IV Intermittent every 24 hours  lactobacillus acidophilus 1Tablet(s) Oral daily  heparin  Injectable 5000Unit(s) SubCutaneous every 12 hours  sodium chloride 0.9%. 1000milliLiter(s) IV Continuous <Continuous>    MEDICATIONS  (PRN):  dextrose Gel 1Dose(s) Oral once PRN Blood Glucose LESS THAN 70 milliGRAM(s)/deciliter  glucagon  Injectable 1milliGRAM(s) IntraMuscular once PRN Glucose LESS THAN 70 milligrams/deciliter      FAMILY HISTORY:      SOCIAL HISTORY:  ***    REVIEW OF SYSTEM:  Pertinent items are noted in HPI.  Constitutional negative for chills, fevers, sweats and weight loss  Eyes:  negative for color blindness and visual disturbance  Ears, nose, mouth,   throat, and face:  negative for epistaxis, nasal congestion, sore throat and   tinnitus  Neck: negative for enlargement, pain and difficulty in swallowing  Respiratory: negative for cough, dyspnea on exertion, pleuritic chest pain  and wheezing  Cardiovascular:  negative for chest pain, dyspnea and palpitations  Gastrointestinal: negative for abdominal pain, diarrhea, nausea and vomiting  Genitourinary: negative for dysuria, frequency and urinary incontinence  Skin:  negative for redness, rash, pruritus, swelling, dryness and   fissures  Hematologic/lymphatic: negative for bleeding and easy bruising  Musculoskeletal: negative for arthralgias, back pain and muscle weakness  Neurological: negative for dizziness, headaches, seizures and tremors  Behavioral/Psych:  negative for mood change, depression, suicidal attempts  Endocrine: negative for blurry vision, polydipsia and polyuria,   diaphoresis,   Allergic/Immunologic: negative for anaphylaxis, angioedema and urticaria    Vital Signs Last 24 Hrs  T(C): 36.3, Max: 36.3 (04-26 @ 11:27)  T(F): 97.4, Max: 97.4 (04-27 @ 05:24)  HR: 76 (70 - 78)  BP: 137/55 (120/68 - 137/55)   BP(mean): --  RR: 18 (16 - 18)  SpO2: 100% (100% - 100%)    I&O's Summary    I & Os for current day (as of 27 Apr 2017 08:03)  =============================================  IN: 250 ml / OUT: 0 ml / NET: 250 ml    PHYSICAL EXAM  General Appearance: cooperative, no acute distress,   HEENT: PERRL, conjunctiva clear, EOM's intact, non injected pharynx, no exudate, TM   normal  Neck: Supple, , no adenopathy, thyroid: not enlarged, no carotid bruit or JVD  Back: Symmetric, no  tenderness,no soft tissue tenderness  Lungs: Clear to auscultation bilaterally,no adventitious breath sounds, normal   expiratory phase  Heart: Regular rate and rhythm, S1, S2 normal, no murmur, rub or gallop  Abdomen: Soft, non-tender, bowel sounds active , no hepatosplenomegaly  Extremities: no cyanosis or edema, no joint swelling  Skin: Skin color, texture normal, no rashes   Neurologic: Alert and oriented X3 , cranial nerves intact, sensory and motor normal,        INTERPRETATION OF TELEMETRY:    ECG:        LABS:                          11.7   8.6   )-----------( 128      ( 27 Apr 2017 04:45 )             36.3     04-27    141  |  108  |  33<H>  ----------------------------<  103<H>  3.9   |  22  |  1.77<H>    Ca    9.0      27 Apr 2017 04:45    TPro  6.4  /  Alb  3.4  /  TBili  0.7  /  DBili  x   /  AST  14<L>  /  ALT  20  /  AlkPhos  47  04-27            PT/INR - ( 26 Apr 2017 06:23 )   PT: 14.8 sec;   INR: 1.36 ratio                   RADIOLOGY & ADDITIONAL STUDIES:    IMPRESSION:1. PVD  with osteomyelitis right hallux  2. CAD- s/p CABG- stable  3. V Tach/ ICD  4. DM  5. Hx a fib/flutter    plan- no contraindication to surgery toady. Continue carvedilol, mexiletine, lisinpril, antibiotics

## 2017-04-27 NOTE — BRIEF OPERATIVE NOTE - PRE-OP DX
Osteomyelitis of ankle or foot  04/27/2017  Osteomyelitis/Gangrene R Hallux  Active  NICHOLAS GAGNON

## 2017-04-27 NOTE — PROGRESS NOTE ADULT - SUBJECTIVE AND OBJECTIVE BOX
HPI: 76 y/o male with a h/o VT s/p AICD, AFib on AC, CAD s/p CABGx2, DM2, gout, glaucoma,  HLD, chronic systolic CHF, PVD, CKD 2-3,hx of vasectomy, S/p R femoral endarterectomy on 2/6/2017, came to the ED on 4/20/17 after being sent from Dr. Gamble's office for IV abx due to pain, necrosis, erythema of infected first metatarsal of right foot. Pt states the wound began approximately 3 months ago, and there has been a clear fluid drainage. Pt states he has a hx of recurrent infection in this toe. Had bypass in [facility] with Dr. Royal 2 months ago which temporarily helped symptoms but symptoms have been worsening recently.     4/22: Pt seen and examined. No new complaints.   4/23 - feels well. no cp, sob.    4/24 - feels well. ambulating well.   4/25: No complaints at this time. No fever, chills, N, V, pain.  4/26: No complaints. Pt very reluctant to amputate his infected/necrotic toe.    4/27: Anxious about surgery but otherwise status quo.    MEDICATIONS  (STANDING):  aspirin enteric coated 81milliGRAM(s) Oral at bedtime  lisinopril 5milliGRAM(s) Oral at bedtime  digoxin     Tablet 0.125milliGRAM(s) Oral at bedtime  mexiletine 150milliGRAM(s) Oral every 12 hours  allopurinol 100milliGRAM(s) Oral after dinner  carvedilol 25milliGRAM(s) Oral every 12 hours  dorzolamide 2%/timolol 0.5% Ophthalmic Solution 1Drop(s) Left EYE two times a day  latanoprost 0.005% Ophthalmic Solution 1Drop(s) Left EYE at bedtime  pantoprazole    Tablet 40milliGRAM(s) Oral before breakfast  atorvastatin 5milliGRAM(s) Oral at bedtime  piperacillin/tazobactam IVPB. 3.375Gram(s) IV Intermittent every 8 hours  insulin lispro (HumaLOG) corrective regimen sliding scale  SubCutaneous three times a day before meals  insulin lispro (HumaLOG) corrective regimen sliding scale  SubCutaneous at bedtime  dextrose 5%. 1000milliLiter(s) IV Continuous <Continuous>  dextrose 50% Injectable 12.5Gram(s) IV Push once  dextrose 50% Injectable 25Gram(s) IV Push once  dextrose 50% Injectable 25Gram(s) IV Push once  vancomycin  IVPB 750milliGRAM(s) IV Intermittent every 24 hours  lactobacillus acidophilus 1Tablet(s) Oral daily  heparin  Injectable 5000Unit(s) SubCutaneous every 12 hours  sodium chloride 0.9%. 1000milliLiter(s) IV Continuous <Continuous>    MEDICATIONS  (PRN):  dextrose Gel 1Dose(s) Oral once PRN Blood Glucose LESS THAN 70 milliGRAM(s)/deciliter  glucagon  Injectable 1milliGRAM(s) IntraMuscular once PRN Glucose LESS THAN 70 milligrams/deciliter    REVIEW OF SYSTEMS: as per HPI other wise negative     Vital Signs Last 24 Hrs  T(C): 36.5, Max: 36.5 (04-27 @ 11:18)  T(F): 97.7, Max: 97.7 (04-27 @ 11:18)  HR: 78 (72 - 78)  BP: 114/59 (114/59 - 137/55)  BP(mean): --  RR: 18 (16 - 18)  SpO2: 100% (100% - 100%)      PHYSICAL EXAM:  Constitutional: Well appearing, NAD  HEENT: Atraumatic, ENRIQUETA, Normal, No congestion  Respiratory: Breath Sounds normal b/l, no rales/rhonchi/wheeze  Cardiovascular: Normal S1S2; No murmurs, gallops, or rubs.  Gastrointestinal: Nondistended. Abdomen soft, non tender, Bowel sounds present in all 4 quadrants.  Extremities: Erythema and swelling of medial side of 1st metatarsal of right toe with surrounding black eschar. Lysis of nail of 1st metatarsal, tender to palpation. No edema of lower extremities.   Neurological: AAO x 3, no gross focal motor deficits  Back: No CVA tenderness   Musculoskeletal: non tender  Breasts: Deferred  Genitourinary: deferred  Rectal: Deferred                              11.7   8.6   )-----------( 128      ( 27 Apr 2017 04:45 )             36.3     04-27    141  |  108  |  33<H>  ----------------------------<  103<H>  3.9   |  22  |  1.77<H>    Ca    9.0      27 Apr 2017 04:45    TPro  6.4  /  Alb  3.4  /  TBili  0.7  /  DBili  x   /  AST  14<L>  /  ALT  20  /  AlkPhos  47  04-27    CAPILLARY BLOOD GLUCOSE  116 (27 Apr 2017 12:02)  104 (27 Apr 2017 05:23)  149 (26 Apr 2017 21:35)  130 (26 Apr 2017 16:43)    LIVER FUNCTIONS - ( 27 Apr 2017 04:45 )  Alb: 3.4 g/dL / Pro: 6.4 gm/dL / ALK PHOS: 47 U/L / ALT: 20 U/L / AST: 14 U/L / GGT: x           PT/INR - ( 26 Apr 2017 06:23 )   PT: 14.8 sec;   INR: 1.36 ratio         Assessment and Plan:   · Assessment		  78 yo male with PMH of VT s/p AICD, AFib on AC, CAD s/p CABGx2, DM2, gout, glaucoma,  HLD, chronic systolic CHF, PVD, CKD 2-3,hx of vasectomy, S/p R femoral endarterectomy on 2/6/2017, admitted for toe infection.    Problem/Plan - 1:  ·  Problem: Toe infection.  Plan: - Cellulitis of right hallux with gangrene/OM/necrosis  - continue vanco and zosyn day 7 per ID  - Blood cx neg to date  - WBC scan positive for OM of right hallux.  -Surgery per dr james for amputation of toe.   -Cardiac eval appreciated. Pt medically optimized for surgery. EKG with Normal sinus rhythm, no active cardiac complaints. Pt is low risk for intermediate risk procedure.    Problem/Plan - 2:  ·  Problem: Afib.  Plan: - Hx of afib.  - Continue digoxin, mexiletine, and carvedilol.  - will restart coumadin tonight after surgery.    Problem/Plan - 3:  ·  Problem: CKD (chronic kidney disease).  Plan:  - on ACE  - Hold lasix  - IVF while npo then d/c.    Problem/Plan - 4:  ·  Problem: Chronic systolic heart failure.  Plan: - clinically stable  - on lisinopril (quinapril at home)   - hold lasix .  - Continue digoxin, carvedilol, and mexeletine.     Problem/Plan - 5:  ·  Problem: Diabetes mellitus.    -RAISS    dvt ppx:  -heparin subc    Attending Statement:  >35 minutes spent on total encounter; more than 50% of the visit was spent counseling and/or coordinating care by the attending physician.

## 2017-04-27 NOTE — PROGRESS NOTE ADULT - ASSESSMENT
78 y/o male with a h/o VT s/p AICD, AFib on AC, CAD s/p CABGx2, DM2, gout, glaucoma,  HLD, chronic systolic  CHF, PVD, CKD 2-3,hx of vasectomy,S/p R femoral endarterectomy 2/6//2017 came to the ED on 4/20/17 after being sent from Dr. Gamble's office for IV abx due to pain, necrosis, erythema of infected first metatarsal of right foot. Pt states the wound began approximatley 3 months ago and there has been a clear fluid drainage. Pt states he has a hx of recurrent infection in this toe. States he had bypass in [facility] with Dr. Royal 2 months ago which temporarily helped symptoms but symptoms have been worsening recently. Denies fever, chills, nausea, vomiting, SOB, chest pain, headache, abdominal pain.  1. Right first toe cellulitis with area of gangrene, now found to have osteomyelitis  - day # 7 zosyn and vancomycin  - tolerating antibiotics without rashes or side effects   - discussed that placing picc and 6 weeks antibiotics would most likely put the patient at risk for further progression of infection, cdiff, rash, iv complications such as phlebitis, given that there is gangrene and most likely a bony sequestrum underneath; patient will now opt for toe amputation  2. other issues: VT s/p AICD, AFib on AC, CAD s/p CABGx2, DM2, gout, glaucoma,  HLD, chronic systolic  CHF, PVD, CKD 2-3  - per medicine

## 2017-04-27 NOTE — PROGRESS NOTE ADULT - ASSESSMENT
Pt / PVD/OM/Gangrene R Hallux for Amp today. Pt had cardio eval by Dr VIDYA Olivo. Note, noted & appreciated. To OR today for amp R Hallux. R/B/A/C to Sx fully discussed with pt. He is aware that wound may not heal & require future surgery, long term ABX or more proximal amputation.

## 2017-04-27 NOTE — PROGRESS NOTE ADULT - SUBJECTIVE AND OBJECTIVE BOX
Patient is a 77y old  Male who presents with a chief complaint of infected Right  great toe x 3 months (20 Apr 2017 22:41)      HPI:  77 y.o. male with PMH VT s/p AICD, AFib on AC, CAD s/p CABGx2, DM2, gout, glaucoma,  HLD, chronic systolic  CHF, PVD, CKD 2-3,hx of vasectomy  ,S/p  R femoral endarterectomy feb /6/2017   presents to ED sent from Dr. Gamble's office for IV abx c/o pain, necrosis, erythema of infected first metatarsal on right foot. +Drainage. Has hx of recurrent infection in this toe. Had bypass in [facility] with Dr. Wesley which temporarily helped sx but sx have been worsening recently. States usually pulse is only detected with doppler.  Denies fever. NKDA.  Patient was seen in ED by podiatry dr Gamble Blood cx were drawn and zosyn and vanco admisnitered in ED EKG- NSR,nonspecific intraventricular block,no significant  ST/T chnages as compared to EKG done aug/2016  ROS- denies fever ,chills,chest pain ,SOB,headache,palpitations,abdominal pain,dysuria,flank pain ,vomiting,nausea ,diarrhea PMH: as above PSH:  CABG, AICD placement, vasectomy, R femoral endarterectomy  Allergy:   NKDA SH:  ex smoker, quit about 30 years ago, denies drinking alcohol (20 Apr 2017 21:57)      Allergies    No Known Allergies    Intolerances        MEDICATIONS  (STANDING):  aspirin enteric coated 81milliGRAM(s) Oral at bedtime  lisinopril 5milliGRAM(s) Oral at bedtime  digoxin     Tablet 0.125milliGRAM(s) Oral at bedtime  mexiletine 150milliGRAM(s) Oral every 12 hours  allopurinol 100milliGRAM(s) Oral after dinner  carvedilol 25milliGRAM(s) Oral every 12 hours  dorzolamide 2%/timolol 0.5% Ophthalmic Solution 1Drop(s) Left EYE two times a day  latanoprost 0.005% Ophthalmic Solution 1Drop(s) Left EYE at bedtime  pantoprazole    Tablet 40milliGRAM(s) Oral before breakfast  atorvastatin 5milliGRAM(s) Oral at bedtime  piperacillin/tazobactam IVPB. 3.375Gram(s) IV Intermittent every 8 hours  insulin lispro (HumaLOG) corrective regimen sliding scale  SubCutaneous three times a day before meals  insulin lispro (HumaLOG) corrective regimen sliding scale  SubCutaneous at bedtime  dextrose 5%. 1000milliLiter(s) IV Continuous <Continuous>  dextrose 50% Injectable 12.5Gram(s) IV Push once  dextrose 50% Injectable 25Gram(s) IV Push once  dextrose 50% Injectable 25Gram(s) IV Push once  vancomycin  IVPB 750milliGRAM(s) IV Intermittent every 24 hours  lactobacillus acidophilus 1Tablet(s) Oral daily  heparin  Injectable 5000Unit(s) SubCutaneous every 12 hours  sodium chloride 0.9%. 1000milliLiter(s) IV Continuous <Continuous>    MEDICATIONS  (PRN):  dextrose Gel 1Dose(s) Oral once PRN Blood Glucose LESS THAN 70 milliGRAM(s)/deciliter  glucagon  Injectable 1milliGEXAM:  NM INFLAMMATORY LOC WBC LTD                            PROCEDURE DATE:  04/25/2017        INTERPRETATION:  RADIOPHARMACEUTICAL: 10.0 millicuries Tc-99m labeled   autologous leukocytes, IV.    CLINICAL INFORMATION: 77 year old male with rightgreat toe infection;   referred to evaluate for osteomyelitis.    TECHNIQUE: Radiolabeled autologous leukocytes were injected on 4/24/2017.   Approximately T9 hours later on 4/25/2017 images of the feet were   obtained in the dorsal, plantar, medial and lateral projections.    COMPARISON: Labeled leukocyte study performed on 1/28/2017 was reviewed.    FINDINGS: There is intensely increased labeled leukocyte accumulation in   the right great toe which involves both soft tissue and bone.    IMPRESSION: AbnormalTc-99m labeled leukocyte scan. Osteomyelitis right   great toe.    This is a new finding since the previous study of 1/28/2017.              ANGIE ALEX   This document has been electronically signed. Apr 25 2017  9:37AM          RANDY(s) IntraMuscular once PRN Glucose LESS THAN 70 milligrams/deciliter        RADIOLOGY    CBC Full  -  ( 27 Apr 2017 04:45 )  WBC Count : 8.6 K/uL  Hemoglobin : 11.7 g/dL  Hematocrit : 36.3 %  Platelet Count - Automated : 128 K/uL  Mean Cell Volume : 90.1 fl  Mean Cell Hemoglobin : 29.0 pg  Mean Cell Hemoglobin Concentration : 32.2 gm/dL  Auto Neutrophil # : 6.2 K/uL  Auto Lymphocyte # : 1.4 K/uL  Auto Monocyte # : 0.6 K/uL  Auto Eosinophil # : 0.3 K/uL  Auto Basophil # : 0.1 K/uL  Auto Neutrophil % : 72.2 %  Auto Lymphocyte % : 16.7 %  Auto Monocyte % : 6.6 %  Auto Eosinophil % : 3.6 %  Auto Basophil % : 0.9 %      04-27    141  |  108  |  33<H>  ----------------------------<  103<H>  3.9   |  22  |  1.77<H>    Ca    9.0      27 Apr 2017 04:45    TPro  6.4  /  Alb  3.4  /  TBili  0.7  /  DBili  x   /  AST  14<L>  /  ALT  20  /  AlkPhos  47  04-27

## 2017-04-27 NOTE — BRIEF OPERATIVE NOTE - POST-OP DX
Osteomyelitis of ankle or foot  04/27/2017  Osteomyelitis Right Hallux / Gangrene  Active  NICHOLAS GAGNON

## 2017-04-27 NOTE — PROGRESS NOTE ADULT - SUBJECTIVE AND OBJECTIVE BOX
76 y/o male with a h/o VT s/p AICD, AFib on AC, CAD s/p CABGx2, DM2, gout, glaucoma,  HLD, chronic systolic  CHF, PVD, CKD 2-3,hx of vasectomy,S/p R femoral endarterectomy 2/6//2017 came to the ED on 4/20/17 after being sent from Dr. Gamble's office for IV abx due to pain, necrosis, erythema of infected first metatarsal of right foot. Pt states the wound began approximatley 3 months ago and there has been a clear fluid drainage. Pt states he has a hx of recurrent infection in this toe. States he had bypass in [facility] with Dr. Royal 2 months ago which temporarily helped symptoms but symptoms have been worsening recently. Denies fever, chills, nausea, vomiting, SOB, chest pain, headache, abdominal pain.  Today 4/22 patient ambulating  Today 4/23 patient without complaints  Today 4/24 patient had start of nuclear scan study  Today 4/26 patient equivocal about surgery  Today 4/27 patient awaiting surgery      MEDICATIONS  (STANDING):  aspirin enteric coated 81milliGRAM(s) Oral at bedtime  lisinopril 5milliGRAM(s) Oral at bedtime  digoxin     Tablet 0.125milliGRAM(s) Oral at bedtime  mexiletine 150milliGRAM(s) Oral every 12 hours  allopurinol 100milliGRAM(s) Oral after dinner  carvedilol 25milliGRAM(s) Oral every 12 hours  dorzolamide 2%/timolol 0.5% Ophthalmic Solution 1Drop(s) Left EYE two times a day  latanoprost 0.005% Ophthalmic Solution 1Drop(s) Left EYE at bedtime  pantoprazole    Tablet 40milliGRAM(s) Oral before breakfast  atorvastatin 5milliGRAM(s) Oral at bedtime  piperacillin/tazobactam IVPB. 3.375Gram(s) IV Intermittent every 8 hours  insulin lispro (HumaLOG) corrective regimen sliding scale  SubCutaneous three times a day before meals  insulin lispro (HumaLOG) corrective regimen sliding scale  SubCutaneous at bedtime  dextrose 5%. 1000milliLiter(s) IV Continuous <Continuous>  dextrose 50% Injectable 12.5Gram(s) IV Push once  dextrose 50% Injectable 25Gram(s) IV Push once  dextrose 50% Injectable 25Gram(s) IV Push once  vancomycin  IVPB 750milliGRAM(s) IV Intermittent every 24 hours  lactobacillus acidophilus 1Tablet(s) Oral daily  heparin  Injectable 5000Unit(s) SubCutaneous every 12 hours  sodium chloride 0.9%. 1000milliLiter(s) IV Continuous <Continuous>    MEDICATIONS  (PRN):  dextrose Gel 1Dose(s) Oral once PRN Blood Glucose LESS THAN 70 milliGRAM(s)/deciliter  glucagon  Injectable 1milliGRAM(s) IntraMuscular once PRN Glucose LESS THAN 70 milligrams/deciliter      Vital Signs Last 24 Hrs  T(C): 36.3, Max: 36.3 (04-26 @ 11:27)  T(F): 97.4, Max: 97.4 (04-27 @ 05:24)  HR: 76 (70 - 78)  BP: 137/55 (120/68 - 137/55)  BP(mean): --  RR: 18 (16 - 18)  SpO2: 100% (100% - 100%)    Physical Exam:      Physical Exam:              PHYSICAL EXAM:  Constitutional: Well appearing,NAD  HEENT: Atraumatic, ENRIQUETA, Normal, No congestion  Respiratory: Breath Sounds normal, no rhonchi/wheeze  Cardiovascular: N S1S2; DANTE present  Gastrointestinal: Abdomen soft, non tender, Bowel sounds present  Extremities: No B/l lower extremity and pedal edema, except small area of erythema and swelling on great right toe surrounding black eschar. Right great toe lysis of nail with dorsal black eschar,with surrounding 1st metatarsal  skin erythema with mild swelling and tenderness, decreased sensation to tight touch  Neurological: AAO x 3, no gross focal motor deficits    Back: No CVA tenderness   Musculoskeletal: non tender  Breasts: Deferred  Genitourinary: deferred  Rectal: Deferred    LABS: All Labs Reviewed:           Labs:                        11.9   9.5   )-----------( 126      ( 25 Apr 2017 06:08 )             37.2     04-26    142  |  109<H>  |  29<H>  ----------------------------<  104<H>  3.7   |  22  |  1.88<H>    Ca    9.0      26 Apr 2017 06:23         Vancomycin Level, Trough: 8.0 ug/mL (04-25 @ 11:12)      Cultures:                    RADIOLOGY/EKG:  EXAM:  FOOT-RIGHT                            PROCEDURE DATE:  04/20/2017        INTERPRETATION:  Right foot    Indication: Back toe infection and swelling, rule out osteomyelitis    IMPRESSION: 3 views demonstrate superficial ulceration at the medial   aspect of the first distal phalanx. No underlying ostial lysis or soft   tissue gas, no visible fracture or dislocation. Vascular calcifications   are noted.      EXAM:  CHEST SINGLE VIEW FRONTAL                            PROCEDURE DATE:  04/20/2017        INTERPRETATION:    INDICATION: History of diabetes great artery disease.    Single frontal view of chest dated 4/20/2017 9:16 PM.  Prior study of   1/26/2017.    FINDINGS /   IMPRESSION:     There is no acute consolidation.  There are no pleural effusion. Patient   is status post sternotomy. There is cardiomegaly. There is dual-lead   pacemaker.    There are degenerative changes.     EXAM:  NM INFLAMMATORY LOC WBC LTD                            PROCEDURE DATE:  04/25/2017        INTERPRETATION:  RADIOPHARMACEUTICAL: 10.0 millicuries Tc-99m labeled   autologous leukocytes, IV.    CLINICAL INFORMATION: 77 year old male with rightgreat toe infection;   referred to evaluate for osteomyelitis.    TECHNIQUE: Radiolabeled autologous leukocytes were injected on 4/24/2017.   Approximately T9 hours later on 4/25/2017 images of the feet were   obtained in the dorsal, plantar, medial and lateral projections.    COMPARISON: Labeled leukocyte study performed on 1/28/2017 was reviewed.    FINDINGS: There is intensely increased labeled leukocyte accumulation in   the right great toe which involves both soft tissue and bone.    IMPRESSION: AbnormalTc-99m labeled leukocyte scan. Osteomyelitis right   great toe.    This is a new finding since the previous study of 1/28/2017.  Code status: Full resuscitation

## 2017-04-28 LAB
ANION GAP SERPL CALC-SCNC: 10 MMOL/L — SIGNIFICANT CHANGE UP (ref 5–17)
BUN SERPL-MCNC: 28 MG/DL — HIGH (ref 7–23)
CALCIUM SERPL-MCNC: 8.6 MG/DL — SIGNIFICANT CHANGE UP (ref 8.5–10.1)
CHLORIDE SERPL-SCNC: 112 MMOL/L — HIGH (ref 96–108)
CO2 SERPL-SCNC: 19 MMOL/L — LOW (ref 22–31)
CREAT SERPL-MCNC: 1.65 MG/DL — HIGH (ref 0.5–1.3)
GLUCOSE SERPL-MCNC: 92 MG/DL — SIGNIFICANT CHANGE UP (ref 70–99)
HCT VFR BLD CALC: 35.4 % — LOW (ref 39–50)
HGB BLD-MCNC: 11.5 G/DL — LOW (ref 13–17)
INR BLD: 1.17 RATIO — HIGH (ref 0.88–1.16)
MCHC RBC-ENTMCNC: 29.1 PG — SIGNIFICANT CHANGE UP (ref 27–34)
MCHC RBC-ENTMCNC: 32.5 GM/DL — SIGNIFICANT CHANGE UP (ref 32–36)
MCV RBC AUTO: 89.4 FL — SIGNIFICANT CHANGE UP (ref 80–100)
PLATELET # BLD AUTO: 128 K/UL — LOW (ref 150–400)
POTASSIUM SERPL-MCNC: 3.8 MMOL/L — SIGNIFICANT CHANGE UP (ref 3.5–5.3)
POTASSIUM SERPL-SCNC: 3.8 MMOL/L — SIGNIFICANT CHANGE UP (ref 3.5–5.3)
PROTHROM AB SERPL-ACNC: 12.7 SEC — SIGNIFICANT CHANGE UP (ref 9.8–12.7)
RBC # BLD: 3.96 M/UL — LOW (ref 4.2–5.8)
RBC # FLD: 16.1 % — HIGH (ref 10.3–14.5)
SODIUM SERPL-SCNC: 141 MMOL/L — SIGNIFICANT CHANGE UP (ref 135–145)
WBC # BLD: 9.9 K/UL — SIGNIFICANT CHANGE UP (ref 3.8–10.5)
WBC # FLD AUTO: 9.9 K/UL — SIGNIFICANT CHANGE UP (ref 3.8–10.5)

## 2017-04-28 RX ORDER — ACETAMINOPHEN 500 MG
650 TABLET ORAL EVERY 6 HOURS
Qty: 0 | Refills: 0 | Status: DISCONTINUED | OUTPATIENT
Start: 2017-04-28 | End: 2017-04-29

## 2017-04-28 RX ORDER — WARFARIN SODIUM 2.5 MG/1
5 TABLET ORAL DAILY
Qty: 0 | Refills: 0 | Status: DISCONTINUED | OUTPATIENT
Start: 2017-04-28 | End: 2017-04-29

## 2017-04-28 RX ORDER — WARFARIN SODIUM 2.5 MG/1
5 TABLET ORAL DAILY
Qty: 0 | Refills: 0 | Status: DISCONTINUED | OUTPATIENT
Start: 2017-04-28 | End: 2017-04-28

## 2017-04-28 RX ADMIN — OXYCODONE HYDROCHLORIDE 5 MILLIGRAM(S): 5 TABLET ORAL at 12:20

## 2017-04-28 RX ADMIN — Medication 1 TABLET(S): at 11:34

## 2017-04-28 RX ADMIN — MEXILETINE HYDROCHLORIDE 150 MILLIGRAM(S): 150 CAPSULE ORAL at 17:46

## 2017-04-28 RX ADMIN — DORZOLAMIDE HYDROCHLORIDE TIMOLOL MALEATE 1 DROP(S): 20; 5 SOLUTION/ DROPS OPHTHALMIC at 17:44

## 2017-04-28 RX ADMIN — Medication 0.12 MILLIGRAM(S): at 21:49

## 2017-04-28 RX ADMIN — Medication 1: at 17:43

## 2017-04-28 RX ADMIN — CARVEDILOL PHOSPHATE 25 MILLIGRAM(S): 80 CAPSULE, EXTENDED RELEASE ORAL at 17:44

## 2017-04-28 RX ADMIN — CARVEDILOL PHOSPHATE 25 MILLIGRAM(S): 80 CAPSULE, EXTENDED RELEASE ORAL at 06:15

## 2017-04-28 RX ADMIN — PIPERACILLIN AND TAZOBACTAM 25 GRAM(S): 4; .5 INJECTION, POWDER, LYOPHILIZED, FOR SOLUTION INTRAVENOUS at 06:17

## 2017-04-28 RX ADMIN — SODIUM CHLORIDE 75 MILLILITER(S): 9 INJECTION INTRAMUSCULAR; INTRAVENOUS; SUBCUTANEOUS at 06:18

## 2017-04-28 RX ADMIN — DORZOLAMIDE HYDROCHLORIDE TIMOLOL MALEATE 1 DROP(S): 20; 5 SOLUTION/ DROPS OPHTHALMIC at 06:16

## 2017-04-28 RX ADMIN — LISINOPRIL 5 MILLIGRAM(S): 2.5 TABLET ORAL at 21:48

## 2017-04-28 RX ADMIN — Medication 100 MILLIGRAM(S): at 17:46

## 2017-04-28 RX ADMIN — Medication 81 MILLIGRAM(S): at 21:46

## 2017-04-28 RX ADMIN — HEPARIN SODIUM 5000 UNIT(S): 5000 INJECTION INTRAVENOUS; SUBCUTANEOUS at 17:45

## 2017-04-28 RX ADMIN — Medication 100 MILLIGRAM(S): at 17:45

## 2017-04-28 RX ADMIN — WARFARIN SODIUM 5 MILLIGRAM(S): 2.5 TABLET ORAL at 21:48

## 2017-04-28 RX ADMIN — LATANOPROST 1 DROP(S): 0.05 SOLUTION/ DROPS OPHTHALMIC; TOPICAL at 21:49

## 2017-04-28 RX ADMIN — HEPARIN SODIUM 5000 UNIT(S): 5000 INJECTION INTRAVENOUS; SUBCUTANEOUS at 06:16

## 2017-04-28 RX ADMIN — MORPHINE SULFATE 2 MILLIGRAM(S): 50 CAPSULE, EXTENDED RELEASE ORAL at 01:16

## 2017-04-28 RX ADMIN — ATORVASTATIN CALCIUM 5 MILLIGRAM(S): 80 TABLET, FILM COATED ORAL at 21:49

## 2017-04-28 RX ADMIN — PANTOPRAZOLE SODIUM 40 MILLIGRAM(S): 20 TABLET, DELAYED RELEASE ORAL at 06:17

## 2017-04-28 RX ADMIN — MEXILETINE HYDROCHLORIDE 150 MILLIGRAM(S): 150 CAPSULE ORAL at 06:17

## 2017-04-28 RX ADMIN — Medication 650 MILLIGRAM(S): at 09:22

## 2017-04-28 RX ADMIN — Medication 150 MILLIGRAM(S): at 01:15

## 2017-04-28 NOTE — PROGRESS NOTE ADULT - SUBJECTIVE AND OBJECTIVE BOX
Patient is a 77y old  Male who presents with a chief complaint of infected Right  great toe x 3 months (20 Apr 2017 22:41)      HPI:  77 y.o. male with PMH VT s/p AICD, AFib on AC, CAD s/p CABGx2, DM2, gout, glaucoma,  HLD, chronic systolic  CHF, PVD, CKD 2-3,hx of vasectomy  ,S/p  R femoral endarterectomy feb /6/2017   presents to ED sent from Dr. Gamble's office for IV abx c/o pain, necrosis, erythema of infected first metatarsal on right foot. +Drainage. Has hx of recurrent infection in this toe. Had bypass in [facility] with Dr. Wesley which temporarily helped sx but sx have been worsening recently. States usually pulse is only detected with doppler.  Denies fever. NKDA.  Patient was seen in ED by podiatry dr Gamble Blood cx were drawn and zosyn and vanco admisnitered in ED EKG- NSR,nonspecific intraventricular block,no significant  ST/T chnages as compared to EKG done aug/2016  ROS- denies fever ,chills,chest pain ,SOB,headache,palpitations,abdominal pain,dysuria,flank pain ,vomiting,nausea ,diarrhea PMH: as above PSH:  CABG, AICD placement, vasectomy, R femoral endarterectomy  Allergy:   NKDA SH:  ex smoker, quit about 30 years ago, denies drinking alcohol (20 Apr 2017 21:57)      Allergies    No Known Allergies    Intolerances        MEDICATIONS  (STANDING):  sodium chloride 0.9%. 1000milliLiter(s) IV Continuous <Continuous>  acetaminophen  IVPB. 1000milliGRAM(s) IV Intermittent once  lactobacillus acidophilus 1Tablet(s) Oral daily  pantoprazole    Tablet 40milliGRAM(s) Oral before breakfast  mexiletine 150milliGRAM(s) Oral every 12 hours  lisinopril 5milliGRAM(s) Oral at bedtime  latanoprost 0.005% Ophthalmic Solution 1Drop(s) Left EYE at bedtime  dorzolamide 2%/timolol 0.5% Ophthalmic Solution 1Drop(s) Left EYE two times a day  digoxin     Tablet 0.125milliGRAM(s) Oral at bedtime  carvedilol 25milliGRAM(s) Oral every 12 hours  atorvastatin 5milliGRAM(s) Oral at bedtime  allopurinol 100milliGRAM(s) Oral after dinner  insulin lispro (HumaLOG) corrective regimen sliding scale  SubCutaneous at bedtime  insulin lispro (HumaLOG) corrective regimen sliding scale  SubCutaneous three times a day before meals  dextrose 50% Injectable 25Gram(s) IV Push once  dextrose 50% Injectable 25Gram(s) IV Push once  dextrose 50% Injectable 12.5Gram(s) IV Push once  piperacillin/tazobactam IVPB. 3.375Gram(s) IV Intermittent every 8 hours  vancomycin  IVPB 750milliGRAM(s) IV Intermittent every 24 hours  aspirin enteric coated 81milliGRAM(s) Oral at bedtime  heparin  Injectable 5000Unit(s) SubCutaneous every 12 hours  docusate sodium 200milliGRAM(s) Oral at bedtime    MEDICATIONS  (PRN):  fentaNYL    Injectable 50MICROGram(s) IV Push every 5 minutes PRN Severe Pain  oxyCODONE IR 5milliGRAM(s) Oral every 4 hours PRN For moderate pain  ondansetron Injectable 4milliGRAM(s) IV Push once PRN Nausea and/or Vomiting  glucagon  Injectable 1milliGRAM(s) IntraMuscular once PRN Glucose LESS THAN 70 milligrams/deciliter  dextrose GeEXAM:  FOOT-RIGHT                            PROCEDURE DATE:  04/27/2017        INTERPRETATION:  EXAMINATION DATE: April 27, 2017 at 1647 hours.  CLINICAL INFORMATION: Gangrene right hallux.    TECHNIQUE: 2 views of the right foot were obtained.   COMPARISON: 4/22/2017.    IMPRESSION:    Interval resection of the right first digit at the level of the   metatarsophalangeal joint. Expected subacute postoperative changes   include soft tissue swelling and subcutaneous emphysema. Vascular   calcifications are present.      Culture - Tissue with Gram Stain (04.27.17 @ 16:47)    Gram Stain:   Few polymorphonuclear leukocytes per low power field  No organisms seen    Specimen Source: .Tissue wound deep right great toe            SAMI ABBOTT   This document has been electronically signed. Apr 27 2017  5:03PM            l 1Dose(s) Oral once PRN Blood Glucose LESS THAN 70 milliGRAM(s)/deciliter        RADIOLOGY    CBC Full  -  ( 27 Apr 2017 04:45 )  WBC Count : 8.6 K/uL  Hemoglobin : 11.7 g/dL  Hematocrit : 36.3 %  Platelet Count - Automated : 128 K/uL  Mean Cell Volume : 90.1 fl  Mean Cell Hemoglobin : 29.0 pg  Mean Cell Hemoglobin Concentration : 32.2 gm/dL  Auto Neutrophil # : 6.2 K/uL  Auto Lymphocyte # : 1.4 K/uL  Auto Monocyte # : 0.6 K/uL  Auto Eosinophil # : 0.3 K/uL  Auto Basophil # : 0.1 K/uL  Auto Neutrophil % : 72.2 %  Auto Lymphocyte % : 16.7 %  Auto Monocyte % : 6.6 %  Auto Eosinophil % : 3.6 %  Auto Basophil % : 0.9 %      04-27    141  |  108  |  33<H>  ----------------------------<  103<H>  3.9   |  22  |  1.77<H>    Ca    9.0      27 Apr 2017 04:45    TPro  6.4  /  Alb  3.4  /  TBili  0.7  /  DBili  x   /  AST  14<L>  /  ALT  20  /  AlkPhos  47  04-27

## 2017-04-28 NOTE — PROGRESS NOTE ADULT - SUBJECTIVE AND OBJECTIVE BOX
HPI: 76 y/o male with a h/o VT s/p AICD, AFib on AC, CAD s/p CABGx2, DM2, gout, glaucoma,  HLD, chronic systolic CHF, PVD, CKD 2-3,hx of vasectomy, S/p R femoral endarterectomy on 2/6/2017, came to the ED on 4/20/17 after being sent from Dr. Gamble's office for IV abx due to pain, necrosis, erythema of infected first metatarsal of right foot. Pt states the wound began approximately 3 months ago, and there has been a clear fluid drainage. Pt states he has a hx of recurrent infection in this toe. Had bypass in [facility] with Dr. Royal 2 months ago which temporarily helped symptoms but symptoms have been worsening recently.   Patient is s/p Right hallux amputation with Dr Madison.    4/28- complaining of right foot pain but improved with pain meds    Vital Signs Last 24 Hrs  T(C): 36.3, Max: 37 (04-28 @ 05:25)  T(F): 97.4, Max: 98.6 (04-28 @ 05:25)  HR: 68 (68 - 82)  BP: 129/54 (112/51 - 138/63)  BP(mean): --  RR: 20 (10 - 21)  SpO2: 100% (3% - 100%)    ROS:   All 10 systems reviewed and found to be negative with the exception of what has been described above.    PE:  Constitutional: NAD, laying in bed  HEENT: NC/AT, EOMI, PERRLA  Neck: supple  Back: no tenderness  Respiratory: LCTA  Cardiovascular: S1S2 regular, no murmurs  Abdomen: soft, not tender, not distended, positive BS  Genitourinary: voiding  Rectal: deferred  Musculoskeletal: no muscle tenderness, no joint swelling or tenderness  Extremities: no pedal edema - right foot dressing intact  Neurological: no focal deficits      MEDICATIONS  (STANDING):  sodium chloride 0.9%. 1000milliLiter(s) IV Continuous <Continuous>  lactobacillus acidophilus 1Tablet(s) Oral daily  pantoprazole    Tablet 40milliGRAM(s) Oral before breakfast  mexiletine 150milliGRAM(s) Oral every 12 hours  lisinopril 5milliGRAM(s) Oral at bedtime  latanoprost 0.005% Ophthalmic Solution 1Drop(s) Left EYE at bedtime  dorzolamide 2%/timolol 0.5% Ophthalmic Solution 1Drop(s) Left EYE two times a day  digoxin     Tablet 0.125milliGRAM(s) Oral at bedtime  carvedilol 25milliGRAM(s) Oral every 12 hours  atorvastatin 5milliGRAM(s) Oral at bedtime  allopurinol 100milliGRAM(s) Oral after dinner  insulin lispro (HumaLOG) corrective regimen sliding scale  SubCutaneous at bedtime  insulin lispro (HumaLOG) corrective regimen sliding scale  SubCutaneous three times a day before meals  dextrose 50% Injectable 25Gram(s) IV Push once  dextrose 50% Injectable 25Gram(s) IV Push once  dextrose 50% Injectable 12.5Gram(s) IV Push once  piperacillin/tazobactam IVPB. 3.375Gram(s) IV Intermittent every 8 hours  vancomycin  IVPB 750milliGRAM(s) IV Intermittent every 24 hours  aspirin enteric coated 81milliGRAM(s) Oral at bedtime  heparin  Injectable 5000Unit(s) SubCutaneous every 12 hours  docusate sodium 200milliGRAM(s) Oral at bedtime  warfarin 5milliGRAM(s) Oral daily    MEDICATIONS  (PRN):  fentaNYL    Injectable 50MICROGram(s) IV Push every 5 minutes PRN Severe Pain  oxyCODONE IR 5milliGRAM(s) Oral every 4 hours PRN For moderate pain  ondansetron Injectable 4milliGRAM(s) IV Push once PRN Nausea and/or Vomiting  glucagon  Injectable 1milliGRAM(s) IntraMuscular once PRN Glucose LESS THAN 70 milligrams/deciliter  dextrose Gel 1Dose(s) Oral once PRN Blood Glucose LESS THAN 70 milliGRAM(s)/deciliter  acetaminophen   Tablet. 650milliGRAM(s) Oral every 6 hours PRN Moderate Pain (4 - 6)                                  11.7   8.6   )-----------( 128      ( 27 Apr 2017 04:45 )             36.3     04-27    141  |  108  |  33<H>  ----------------------------<  103<H>  3.9   |  22  |  1.77<H>    Ca    9.0      27 Apr 2017 04:45    TPro  6.4  /  Alb  3.4  /  TBili  0.7  /  DBili  x   /  AST  14<L>  /  ALT  20  /  AlkPhos  47  04-27    CAPILLARY BLOOD GLUCOSE  116 (27 Apr 2017 12:02)  104 (27 Apr 2017 05:23)  149 (26 Apr 2017 21:35)  130 (26 Apr 2017 16:43)    LIVER FUNCTIONS - ( 27 Apr 2017 04:45 )  Alb: 3.4 g/dL / Pro: 6.4 gm/dL / ALK PHOS: 47 U/L / ALT: 20 U/L / AST: 14 U/L / GGT: x           PT/INR - ( 26 Apr 2017 06:23 )   PT: 14.8 sec;   INR: 1.36 ratio         Assessment and Plan:   · Assessment		  78 yo male with PMH of VT s/p AICD, AFib on AC, CAD s/p CABGx2, DM2, gout, glaucoma,  HLD, chronic systolic CHF, PVD, CKD 2-3,hx of vasectomy, S/p R femoral endarterectomy on 2/6/2017, admitted for toe infection.    Problem/Plan - 1:  ·  Problem: Toe infection.  Plan: - Cellulitis of right hallux with gangrene/OM/necrosis  - continue vanco and zosyn day 7 per ID  - Blood cx neg to date  - WBC scan positive for OM of right hallux.  - S/P Right hallux amputation with Dr Corona- POD#1  - Cardiac eval appreciated. Pt medically optimized for surgery. EKG with Normal sinus rhythm, no active cardiac complaints. Pt is low risk for intermediate risk procedure.    Problem/Plan - 2:  ·  Problem: Afib.  Plan: - Hx of afib.  - Continue digoxin, mexiletine, and carvedilol.  - will restart coumadin tonight     Problem/Plan - 3:  ·  Problem: CKD (chronic kidney disease).  Plan:  - on ACE  - Hold lasix  - IVF while npo then d/c.    Problem/Plan - 4:  ·  Problem: Chronic systolic heart failure.  Plan: - clinically stable  - on lisinopril (quinapril at home)   - hold lasix .  - Continue digoxin, carvedilol, and mexeletine.     Problem/Plan - 5:  ·  Problem: Diabetes mellitus.    -RAISS    dvt ppx:  -heparin subc    Case discussed with Dr madison. PT eval today. HPI: 76 y/o male with a h/o VT s/p AICD, AFib on AC, CAD s/p CABGx2, DM2, gout, glaucoma,  HLD, chronic systolic CHF, PVD, CKD 2-3,hx of vasectomy, S/p R femoral endarterectomy on 2/6/2017, came to the ED on 4/20/17 after being sent from Dr. Gamble's office for IV abx due to pain, necrosis, erythema of infected first metatarsal of right foot. Pt states the wound began approximately 3 months ago, and there has been a clear fluid drainage. Pt states he has a hx of recurrent infection in this toe. Had bypass in [facility] with Dr. Royal 2 months ago which temporarily helped symptoms but symptoms have been worsening recently.   Patient is s/p Right hallux amputation with Dr Madison.    4/28- complaining of right foot pain but improved with pain meds    Vital Signs Last 24 Hrs  T(C): 36.3, Max: 37 (04-28 @ 05:25)  T(F): 97.4, Max: 98.6 (04-28 @ 05:25)  HR: 68 (68 - 82)  BP: 129/54 (112/51 - 138/63)  BP(mean): --  RR: 20 (10 - 21)  SpO2: 100% (3% - 100%)    ROS:   All 10 systems reviewed and found to be negative with the exception of what has been described above.    PE:  Constitutional: NAD, laying in bed  HEENT: NC/AT, EOMI, PERRLA  Neck: supple  Back: no tenderness  Respiratory: LCTA  Cardiovascular: S1S2 regular, no murmurs  Abdomen: soft, not tender, not distended, positive BS  Genitourinary: voiding  Rectal: deferred  Musculoskeletal: no muscle tenderness, no joint swelling or tenderness  Extremities: no pedal edema - right foot dressing intact  Neurological: no focal deficits      MEDICATIONS  (STANDING):  sodium chloride 0.9%. 1000milliLiter(s) IV Continuous <Continuous>  lactobacillus acidophilus 1Tablet(s) Oral daily  pantoprazole    Tablet 40milliGRAM(s) Oral before breakfast  mexiletine 150milliGRAM(s) Oral every 12 hours  lisinopril 5milliGRAM(s) Oral at bedtime  latanoprost 0.005% Ophthalmic Solution 1Drop(s) Left EYE at bedtime  dorzolamide 2%/timolol 0.5% Ophthalmic Solution 1Drop(s) Left EYE two times a day  digoxin     Tablet 0.125milliGRAM(s) Oral at bedtime  carvedilol 25milliGRAM(s) Oral every 12 hours  atorvastatin 5milliGRAM(s) Oral at bedtime  allopurinol 100milliGRAM(s) Oral after dinner  insulin lispro (HumaLOG) corrective regimen sliding scale  SubCutaneous at bedtime  insulin lispro (HumaLOG) corrective regimen sliding scale  SubCutaneous three times a day before meals  dextrose 50% Injectable 25Gram(s) IV Push once  dextrose 50% Injectable 25Gram(s) IV Push once  dextrose 50% Injectable 12.5Gram(s) IV Push once  piperacillin/tazobactam IVPB. 3.375Gram(s) IV Intermittent every 8 hours  vancomycin  IVPB 750milliGRAM(s) IV Intermittent every 24 hours  aspirin enteric coated 81milliGRAM(s) Oral at bedtime  heparin  Injectable 5000Unit(s) SubCutaneous every 12 hours  docusate sodium 200milliGRAM(s) Oral at bedtime  warfarin 5milliGRAM(s) Oral daily    MEDICATIONS  (PRN):  fentaNYL    Injectable 50MICROGram(s) IV Push every 5 minutes PRN Severe Pain  oxyCODONE IR 5milliGRAM(s) Oral every 4 hours PRN For moderate pain  ondansetron Injectable 4milliGRAM(s) IV Push once PRN Nausea and/or Vomiting  glucagon  Injectable 1milliGRAM(s) IntraMuscular once PRN Glucose LESS THAN 70 milligrams/deciliter  dextrose Gel 1Dose(s) Oral once PRN Blood Glucose LESS THAN 70 milliGRAM(s)/deciliter  acetaminophen   Tablet. 650milliGRAM(s) Oral every 6 hours PRN Moderate Pain (4 - 6)                                  11.7   8.6   )-----------( 128      ( 27 Apr 2017 04:45 )             36.3     04-27    141  |  108  |  33<H>  ----------------------------<  103<H>  3.9   |  22  |  1.77<H>    Ca    9.0      27 Apr 2017 04:45    TPro  6.4  /  Alb  3.4  /  TBili  0.7  /  DBili  x   /  AST  14<L>  /  ALT  20  /  AlkPhos  47  04-27    CAPILLARY BLOOD GLUCOSE  116 (27 Apr 2017 12:02)  104 (27 Apr 2017 05:23)  149 (26 Apr 2017 21:35)  130 (26 Apr 2017 16:43)    LIVER FUNCTIONS - ( 27 Apr 2017 04:45 )  Alb: 3.4 g/dL / Pro: 6.4 gm/dL / ALK PHOS: 47 U/L / ALT: 20 U/L / AST: 14 U/L / GGT: x           PT/INR - ( 26 Apr 2017 06:23 )   PT: 14.8 sec;   INR: 1.36 ratio         Assessment and Plan:   · Assessment		  78 yo male with PMH of VT s/p AICD, AFib on AC, CAD s/p CABGx2, DM2, gout, glaucoma,  HLD, chronic systolic CHF, PVD, CKD 2-3,hx of vasectomy, S/p R femoral endarterectomy on 2/6/2017, admitted for toe infection.    Problem/Plan - 1:  ·  Problem: Toe infection.  Plan: - Cellulitis of right hallux with gangrene/OM/necrosis S/P Right hallux amputation with Dr Corona- POD#1  - tissue culture - no organisms   - vanco and zosyn x 7 days per ID  - changed to doxy per podiatry (4/28)  - Blood cx neg to date  - WBC scan positive for OM of right hallux.  - Cardiac eval appreciated.    Problem/Plan - 2:  ·  Problem: Afib.  Plan: - Hx of afib.  - Continue digoxin, mexiletine, and carvedilol.  - restart coumadin for goal INR 2-3 with outpatient monitoring with cardiologist.    Problem/Plan - 3:  ·  Problem: CKD (chronic kidney disease).  Plan:  - on ACE - continue.  - Hold home lasix until outpatient follow up.      Problem/Plan - 4:  ·  Problem: Chronic systolic heart failure.  Plan: - clinically stable  - on lisinopril (quinapril at home)   - hold lasix .  - Continue digoxin, carvedilol, and mexeletine.     Problem/Plan - 5:  ·  Problem: Diabetes mellitus.    -RAISS    dvt ppx:  -heparin subc    Case discussed with Dr madison. PT eval today.     dispo:  -PT eval, possible home vs [id number]    Attending Statement:  >35 minutes spent on total encounter; more than 50% of the visit was spent counseling and/or coordinating care by the attending physician.  Patient seen and examined with LETY Laird.  Agree with physical exam and assessment and plan.

## 2017-04-28 NOTE — PHYSICAL THERAPY INITIAL EVALUATION ADULT - CRITERIA FOR SKILLED THERAPEUTIC INTERVENTIONS
anticipated discharge recommendation/rehab potential/impairments found/anticipated equipment needs at discharge

## 2017-04-28 NOTE — PROGRESS NOTE ADULT - SUBJECTIVE AND OBJECTIVE BOX
Patient is a 77y old  Male who presents with a chief complaint of infected Right  great toe x 3 months (20 Apr 2017 22:41)      Followup HPI:  4/28- Normal post op pain in right foot , s/p amputation right first toe 4/27. Denies chest pain, dyspnea.    PAST MEDICAL & SURGICAL HISTORY:  CAD (coronary artery disease)  Diabetes: controlled by diet  Glaucoma  Afib  Hyperlipidemia  PVD (peripheral vascular disease) with claudication  CKD (chronic kidney disease)  Hypertension  Coronary artery disease  Diabetes mellitus  Heart failure  H/O vasectomy  S/P CABG x 2  AICD (automatic cardioverter/defibrillator) present: 2005 and replaced 2015      Review of symptoms:  Negative except for as noted in today's HPI.      MEDICATIONS  (STANDING):  sodium chloride 0.9%. 1000milliLiter(s) IV Continuous <Continuous>  acetaminophen  IVPB. 1000milliGRAM(s) IV Intermittent once  lactobacillus acidophilus 1Tablet(s) Oral daily  pantoprazole    Tablet 40milliGRAM(s) Oral before breakfast  mexiletine 150milliGRAM(s) Oral every 12 hours  lisinopril 5milliGRAM(s) Oral at bedtime  latanoprost 0.005% Ophthalmic Solution 1Drop(s) Left EYE at bedtime  dorzolamide 2%/timolol 0.5% Ophthalmic Solution 1Drop(s) Left EYE two times a day  digoxin     Tablet 0.125milliGRAM(s) Oral at bedtime  carvedilol 25milliGRAM(s) Oral every 12 hours  atorvastatin 5milliGRAM(s) Oral at bedtime  allopurinol 100milliGRAM(s) Oral after dinner  insulin lispro (HumaLOG) corrective regimen sliding scale  SubCutaneous at bedtime  insulin lispro (HumaLOG) corrective regimen sliding scale  SubCutaneous three times a day before meals  dextrose 50% Injectable 25Gram(s) IV Push once  dextrose 50% Injectable 25Gram(s) IV Push once  dextrose 50% Injectable 12.5Gram(s) IV Push once  piperacillin/tazobactam IVPB. 3.375Gram(s) IV Intermittent every 8 hours  vancomycin  IVPB 750milliGRAM(s) IV Intermittent every 24 hours  aspirin enteric coated 81milliGRAM(s) Oral at bedtime  heparin  Injectable 5000Unit(s) SubCutaneous every 12 hours  docusate sodium 200milliGRAM(s) Oral at bedtime    MEDICATIONS  (PRN):  fentaNYL    Injectable 50MICROGram(s) IV Push every 5 minutes PRN Severe Pain  oxyCODONE IR 5milliGRAM(s) Oral every 4 hours PRN For moderate pain  ondansetron Injectable 4milliGRAM(s) IV Push once PRN Nausea and/or Vomiting  glucagon  Injectable 1milliGRAM(s) IntraMuscular once PRN Glucose LESS THAN 70 milligrams/deciliter  dextrose Gel 1Dose(s) Oral once PRN Blood Glucose LESS THAN 70 milliGRAM(s)/deciliter          Vital Signs Last 24 Hrs  T(C): 37, Max: 37 (04-28 @ 05:25)  T(F): 98.6, Max: 98.6 (04-28 @ 05:25)  HR: 68 (68 - 82)  BP: 124/50 (112/51 - 138/63)  BP(mean): --  RR: 18 (10 - 21)  SpO2: 98% (3% - 100%)    I&O's Summary    I & Os for current day (as of 28 Apr 2017 08:10)  =============================================  IN: 1700 ml / OUT: 750 ml / NET: 950 ml      PHYSICAL EXAM  General Appearance:comfortable  HEENT:   Neck: no JVD  Lungs: clear. Left chest wall ICD site normal.  Heart: 3/6 holosystolic murmur LSB radiating to the apex   Abdomen: nontender  Extremities: bandaged right foot.  Neurologic:       INTERPRETATION OF TELEMETRY:    ECG:    LABS:                          11.5   9.9   )-----------( 128      ( 28 Apr 2017 06:19 )             35.4     04-28    141  |  112<H>  |  28<H>  ----------------------------<  92  3.8   |  19<L>  |  1.65<H>    Ca    8.6      28 Apr 2017 06:19    TPro  6.4  /  Alb  3.4  /  TBili  0.7  /  DBili  x   /  AST  14<L>  /  ALT  20  /  AlkPhos  47  04-27            PT/INR - ( 28 Apr 2017 06:19 )   PT: 12.7 sec;   INR: 1.17 ratio                   RADIOLOGY & ADDITIONAL STUDIES:    IMPRESSION:  1. PVD with osteomyelitis of right great toe. s/p amputation 4/27.  2.CAD, s/p CABG- stable  3.History of ventricular tachycardia/ICD-   4.DM  5.History of atrial flutter/fibrillation- now in sinus rhythm  PLAN:  continue lisinopril, mexiletine, carvedilol, ASA. Suggest restart of warfarin. This may continue gradually as an out patient. No cardiac contraindication to discharge.

## 2017-04-28 NOTE — PROGRESS NOTE ADULT - ASSESSMENT
POD # ! Amputation R Hallux at [facility]. No major complaints. Wound looks clean. Post Op Xray clean amp site. Apply DSD Advise Doxycycline 100 mg q12h x 10 Days D/C home. Disp Sx shoe ok to weight bear for necessity. Keep clean & dry f/u in office Monday or as needed. THANKS.

## 2017-04-28 NOTE — PHYSICAL THERAPY INITIAL EVALUATION ADULT - PERTINENT HX OF CURRENT PROBLEM, REHAB EVAL
77M came to the ED on 4/20/17 after being sent from Dr. Gamble's office for IV abx due to pain, necrosis, erythema of infected first metatarsal of right foot. Pt states the wound began approximately 3 months ago, and there has been a clear fluid drainage. Pt states he has a hx of recurrent infection in this toe.

## 2017-04-28 NOTE — PROGRESS NOTE ADULT - ASSESSMENT
76 y/o male with a h/o VT s/p AICD, AFib on AC, CAD s/p CABGx2, DM2, gout, glaucoma,  HLD, chronic systolic  CHF, PVD, CKD 2-3,hx of vasectomy,S/p R femoral endarterectomy 2/6//2017 came to the ED on 4/20/17 after being sent from Dr. Gamble's office for IV abx due to pain, necrosis, erythema of infected first metatarsal of right foot. Pt states the wound began approximatley 3 months ago and there has been a clear fluid drainage. Pt states he has a hx of recurrent infection in this toe. States he had bypass in [facility] with Dr. Royal 2 months ago which temporarily helped symptoms but symptoms have been worsening recently. Denies fever, chills, nausea, vomiting, SOB, chest pain, headache, abdominal pain.  1. Right first toe cellulitis with area of gangrene, now found to have osteomyelitis  - wound care per podiatry  - okay from Infectious disease standpoint to discharge home on doxycycline 100 mg po q 12 hours  2. other issues: VT s/p AICD, AFib on AC, CAD s/p CABGx2, DM2, gout, glaucoma,  HLD, chronic systolic  CHF, PVD, CKD 2-3  - per medicine

## 2017-04-28 NOTE — PROGRESS NOTE ADULT - SUBJECTIVE AND OBJECTIVE BOX
76 y/o male with a h/o VT s/p AICD, AFib on AC, CAD s/p CABGx2, DM2, gout, glaucoma,  HLD, chronic systolic  CHF, PVD, CKD 2-3,hx of vasectomy,S/p R femoral endarterectomy 2/6//2017 came to the ED on 4/20/17 after being sent from Dr. Gamble's office for IV abx due to pain, necrosis, erythema of infected first metatarsal of right foot. Pt states the wound began approximatley 3 months ago and there has been a clear fluid drainage. Pt states he has a hx of recurrent infection in this toe. States he had bypass in [facility] with Dr. Royal 2 months ago which temporarily helped symptoms but symptoms have been worsening recently. Denies fever, chills, nausea, vomiting, SOB, chest pain, headache, abdominal pain.  Today 4/22 patient ambulating  Today 4/23 patient without complaints  Today 4/24 patient had start of nuclear scan study  Today 4/26 patient equivocal about surgery  Today 4/27 patient awaiting surgery  Today 4/28 patient s/p right first toe amputation      MEDICATIONS  (STANDING):  lactobacillus acidophilus 1Tablet(s) Oral daily  pantoprazole    Tablet 40milliGRAM(s) Oral before breakfast  mexiletine 150milliGRAM(s) Oral every 12 hours  lisinopril 5milliGRAM(s) Oral at bedtime  latanoprost 0.005% Ophthalmic Solution 1Drop(s) Left EYE at bedtime  dorzolamide 2%/timolol 0.5% Ophthalmic Solution 1Drop(s) Left EYE two times a day  digoxin     Tablet 0.125milliGRAM(s) Oral at bedtime  carvedilol 25milliGRAM(s) Oral every 12 hours  atorvastatin 5milliGRAM(s) Oral at bedtime  allopurinol 100milliGRAM(s) Oral after dinner  insulin lispro (HumaLOG) corrective regimen sliding scale  SubCutaneous at bedtime  insulin lispro (HumaLOG) corrective regimen sliding scale  SubCutaneous three times a day before meals  dextrose 50% Injectable 25Gram(s) IV Push once  dextrose 50% Injectable 25Gram(s) IV Push once  dextrose 50% Injectable 12.5Gram(s) IV Push once  aspirin enteric coated 81milliGRAM(s) Oral at bedtime  heparin  Injectable 5000Unit(s) SubCutaneous every 12 hours  docusate sodium 200milliGRAM(s) Oral at bedtime  warfarin 5milliGRAM(s) Oral daily  doxycycline hyclate Capsule 100milliGRAM(s) Oral every 12 hours    MEDICATIONS  (PRN):  oxyCODONE IR 5milliGRAM(s) Oral every 4 hours PRN For moderate pain  ondansetron Injectable 4milliGRAM(s) IV Push once PRN Nausea and/or Vomiting  glucagon  Injectable 1milliGRAM(s) IntraMuscular once PRN Glucose LESS THAN 70 milligrams/deciliter  dextrose Gel 1Dose(s) Oral once PRN Blood Glucose LESS THAN 70 milliGRAM(s)/deciliter  acetaminophen   Tablet. 650milliGRAM(s) Oral every 6 hours PRN Moderate Pain (4 - 6)      Vital Signs Last 24 Hrs  T(C): 36.8, Max: 37 (04-28 @ 05:25)  T(F): 98.3, Max: 98.6 (04-28 @ 05:25)  HR: 64 (64 - 82)  BP: 118/53 (112/51 - 138/63)  BP(mean): --  RR: 18 (10 - 21)  SpO2: 100% (3% - 100%)    Physical Exam:        Physical Exam:              PHYSICAL EXAM:  Constitutional: Well appearing,NAD  HEENT: Atraumatic, ENRIQUETA, Normal, No congestion  Respiratory: Breath Sounds normal, no rhonchi/wheeze  Cardiovascular: N S1S2; DANTE present  Gastrointestinal: Abdomen soft, non tender, Bowel sounds present  Extremities: dressing on right foot  Neurological: AAO x 3, no gross focal motor deficits    Back: No CVA tenderness   Musculoskeletal: non tender  Breasts: Deferred  Genitourinary: deferred  Rectal: Deferred    LABS: All Labs Reviewed:           Labs:                        Labs:                        11.5   9.9   )-----------( 128      ( 28 Apr 2017 06:19 )             35.4     04-28    141  |  112<H>  |  28<H>  ----------------------------<  92  3.8   |  19<L>  |  1.65<H>    Ca    8.6      28 Apr 2017 06:19    TPro  6.4  /  Alb  3.4  /  TBili  0.7  /  DBili  x   /  AST  14<L>  /  ALT  20  /  AlkPhos  47  04-27           Cultures:                RADIOLOGY/EKG:  EXAM:  FOOT-RIGHT                            PROCEDURE DATE:  04/20/2017        INTERPRETATION:  Right foot    Indication: Back toe infection and swelling, rule out osteomyelitis    IMPRESSION: 3 views demonstrate superficial ulceration at the medial   aspect of the first distal phalanx. No underlying ostial lysis or soft   tissue gas, no visible fracture or dislocation. Vascular calcifications   are noted.      EXAM:  CHEST SINGLE VIEW FRONTAL                            PROCEDURE DATE:  04/20/2017        INTERPRETATION:    INDICATION: History of diabetes great artery disease.    Single frontal view of chest dated 4/20/2017 9:16 PM.  Prior study of   1/26/2017.    FINDINGS /   IMPRESSION:     There is no acute consolidation.  There are no pleural effusion. Patient   is status post sternotomy. There is cardiomegaly. There is dual-lead   pacemaker.    There are degenerative changes.     EXAM:  NM INFLAMMATORY LOC WBC LTD                            PROCEDURE DATE:  04/25/2017        INTERPRETATION:  RADIOPHARMACEUTICAL: 10.0 millicuries Tc-99m labeled   autologous leukocytes, IV.    CLINICAL INFORMATION: 77 year old male with rightgreat toe infection;   referred to evaluate for osteomyelitis.    TECHNIQUE: Radiolabeled autologous leukocytes were injected on 4/24/2017.   Approximately T9 hours later on 4/25/2017 images of the feet were   obtained in the dorsal, plantar, medial and lateral projections.    COMPARISON: Labeled leukocyte study performed on 1/28/2017 was reviewed.    FINDINGS: There is intensely increased labeled leukocyte accumulation in   the right great toe which involves both soft tissue and bone.    IMPRESSION: AbnormalTc-99m labeled leukocyte scan. Osteomyelitis right   great toe.    This is a new finding since the previous study of 1/28/2017.  Code status: Full resuscitation

## 2017-04-29 ENCOUNTER — TRANSCRIPTION ENCOUNTER (OUTPATIENT)
Age: 77
End: 2017-04-29

## 2017-04-29 VITALS
TEMPERATURE: 98 F | RESPIRATION RATE: 17 BRPM | DIASTOLIC BLOOD PRESSURE: 51 MMHG | HEART RATE: 77 BPM | SYSTOLIC BLOOD PRESSURE: 136 MMHG | OXYGEN SATURATION: 100 %

## 2017-04-29 LAB
-  AMIKACIN: SIGNIFICANT CHANGE UP
-  AMPICILLIN/SULBACTAM: SIGNIFICANT CHANGE UP
-  AMPICILLIN: SIGNIFICANT CHANGE UP
-  AZTREONAM: SIGNIFICANT CHANGE UP
-  CEFAZOLIN: SIGNIFICANT CHANGE UP
-  CEFEPIME: SIGNIFICANT CHANGE UP
-  CEFOXITIN: SIGNIFICANT CHANGE UP
-  CEFTAZIDIME: SIGNIFICANT CHANGE UP
-  CEFTRIAXONE: SIGNIFICANT CHANGE UP
-  CIPROFLOXACIN: SIGNIFICANT CHANGE UP
-  ERTAPENEM: SIGNIFICANT CHANGE UP
-  GENTAMICIN: SIGNIFICANT CHANGE UP
-  IMIPENEM: SIGNIFICANT CHANGE UP
-  LEVOFLOXACIN: SIGNIFICANT CHANGE UP
-  MEROPENEM: SIGNIFICANT CHANGE UP
-  PIPERACILLIN/TAZOBACTAM: SIGNIFICANT CHANGE UP
-  TOBRAMYCIN: SIGNIFICANT CHANGE UP
-  TRIMETHOPRIM/SULFAMETHOXAZOLE: SIGNIFICANT CHANGE UP
INR BLD: 1.18 RATIO — HIGH (ref 0.88–1.16)
METHOD TYPE: SIGNIFICANT CHANGE UP
PROTHROM AB SERPL-ACNC: 12.8 SEC — HIGH (ref 9.8–12.7)

## 2017-04-29 RX ORDER — CARVEDILOL PHOSPHATE 80 MG/1
1 CAPSULE, EXTENDED RELEASE ORAL
Qty: 0 | Refills: 0 | COMMUNITY
Start: 2017-04-29

## 2017-04-29 RX ORDER — POTASSIUM CHLORIDE 20 MEQ
1 PACKET (EA) ORAL
Qty: 0 | Refills: 0 | COMMUNITY

## 2017-04-29 RX ORDER — DORZOLAMIDE HYDROCHLORIDE TIMOLOL MALEATE 20; 5 MG/ML; MG/ML
1 SOLUTION/ DROPS OPHTHALMIC
Qty: 0 | Refills: 0 | COMMUNITY
Start: 2017-04-29

## 2017-04-29 RX ORDER — PANTOPRAZOLE SODIUM 20 MG/1
1 TABLET, DELAYED RELEASE ORAL
Qty: 0 | Refills: 0 | COMMUNITY
Start: 2017-04-29

## 2017-04-29 RX ORDER — PANTOPRAZOLE SODIUM 20 MG/1
1 TABLET, DELAYED RELEASE ORAL
Qty: 0 | Refills: 0 | COMMUNITY

## 2017-04-29 RX ORDER — LATANOPROST 0.05 MG/ML
1 SOLUTION/ DROPS OPHTHALMIC; TOPICAL
Qty: 0 | Refills: 0 | COMMUNITY
Start: 2017-04-29

## 2017-04-29 RX ORDER — DIGOXIN 250 MCG
1 TABLET ORAL
Qty: 0 | Refills: 0 | COMMUNITY
Start: 2017-04-29

## 2017-04-29 RX ORDER — FUROSEMIDE 40 MG
1 TABLET ORAL
Qty: 0 | Refills: 0 | COMMUNITY

## 2017-04-29 RX ORDER — FUROSEMIDE 40 MG
0.5 TABLET ORAL
Qty: 0 | Refills: 0 | COMMUNITY

## 2017-04-29 RX ORDER — ACETAMINOPHEN 500 MG
2 TABLET ORAL
Qty: 0 | Refills: 0 | COMMUNITY
Start: 2017-04-29

## 2017-04-29 RX ADMIN — CARVEDILOL PHOSPHATE 25 MILLIGRAM(S): 80 CAPSULE, EXTENDED RELEASE ORAL at 06:06

## 2017-04-29 RX ADMIN — MEXILETINE HYDROCHLORIDE 150 MILLIGRAM(S): 150 CAPSULE ORAL at 06:06

## 2017-04-29 RX ADMIN — DORZOLAMIDE HYDROCHLORIDE TIMOLOL MALEATE 1 DROP(S): 20; 5 SOLUTION/ DROPS OPHTHALMIC at 06:07

## 2017-04-29 RX ADMIN — Medication 100 MILLIGRAM(S): at 06:06

## 2017-04-29 RX ADMIN — HEPARIN SODIUM 5000 UNIT(S): 5000 INJECTION INTRAVENOUS; SUBCUTANEOUS at 06:05

## 2017-04-29 RX ADMIN — PANTOPRAZOLE SODIUM 40 MILLIGRAM(S): 20 TABLET, DELAYED RELEASE ORAL at 06:06

## 2017-04-29 NOTE — DISCHARGE NOTE ADULT - CARE PLAN
Principal Discharge DX:	Toe infection  Goal:	you finished a course of antibiotics and a toe amputation.  Instructions for follow-up, activity and diet:	follow up with Dr. Gamble.  Continue wound care until then.  Secondary Diagnosis:	Chronic atrial fibrillation  Goal:	continue heart meds.  Instructions for follow-up, activity and diet:	follow up with your cardiologist.  Secondary Diagnosis:	Coronary artery disease involving native heart without angina pectoris, unspecified vessel or lesion type  Goal:	continue heart meds.  Instructions for follow-up, activity and diet:	follow up with your cardiologist.  Secondary Diagnosis:	Type 2 diabetes mellitus with other circulatory complication  Goal:	continue previous regimen  Instructions for follow-up, activity and diet:	follow up with your pcp.  Secondary Diagnosis:	Chronic kidney disease, unspecified stage  Goal:	continue home meds.  Instructions for follow-up, activity and diet:	follow up with your nephrologist and or pcp.  Secondary Diagnosis:	PVD (peripheral vascular disease) with claudication  Goal:	follow up with Dr. Hopkins

## 2017-04-29 NOTE — PROGRESS NOTE ADULT - SUBJECTIVE AND OBJECTIVE BOX
status R 1st toe amputation; pain better    MEDICATIONS  (STANDING):  lactobacillus acidophilus 1Tablet(s) Oral daily  pantoprazole    Tablet 40milliGRAM(s) Oral before breakfast  mexiletine 150milliGRAM(s) Oral every 12 hours  lisinopril 5milliGRAM(s) Oral at bedtime  latanoprost 0.005% Ophthalmic Solution 1Drop(s) Left EYE at bedtime  dorzolamide 2%/timolol 0.5% Ophthalmic Solution 1Drop(s) Left EYE two times a day  digoxin     Tablet 0.125milliGRAM(s) Oral at bedtime  carvedilol 25milliGRAM(s) Oral every 12 hours  atorvastatin 5milliGRAM(s) Oral at bedtime  allopurinol 100milliGRAM(s) Oral after dinner  insulin lispro (HumaLOG) corrective regimen sliding scale  SubCutaneous at bedtime  insulin lispro (HumaLOG) corrective regimen sliding scale  SubCutaneous three times a day before meals  dextrose 50% Injectable 25Gram(s) IV Push once  dextrose 50% Injectable 25Gram(s) IV Push once  dextrose 50% Injectable 12.5Gram(s) IV Push once  aspirin enteric coated 81milliGRAM(s) Oral at bedtime  heparin  Injectable 5000Unit(s) SubCutaneous every 12 hours  docusate sodium 200milliGRAM(s) Oral at bedtime  warfarin 5milliGRAM(s) Oral daily  doxycycline hyclate Capsule 100milliGRAM(s) Oral every 12 hours    MEDICATIONS  (PRN):  oxyCODONE IR 5milliGRAM(s) Oral every 4 hours PRN For moderate pain  ondansetron Injectable 4milliGRAM(s) IV Push once PRN Nausea and/or Vomiting  glucagon  Injectable 1milliGRAM(s) IntraMuscular once PRN Glucose LESS THAN 70 milligrams/deciliter  dextrose Gel 1Dose(s) Oral once PRN Blood Glucose LESS THAN 70 milliGRAM(s)/deciliter  acetaminophen   Tablet. 650milliGRAM(s) Oral every 6 hours PRN Moderate Pain (4 - 6)      Allergies    No Known Allergies    Intolerances        Flatus: [ ] YES [ ] NO             Bowel Movement: [ ] YES [ ] NO  Pain (0-10):            Pain Control Adequate: [ ] YES [ ] NO  Nausea: [ ] YES [ ] NO            Vomiting: [ ] YES [ ] NO  Diarrhea: [ ] YES [ ] NO         Constipation: [ ] YES [ ] NO     Chest Pain: [ ] YES [ ] NO    SOB:  [ ] YES [ ] NO    Vital Signs Last 24 Hrs  T(C): 36.3, Max: 37.1 (04-28 @ 20:55)  T(F): 97.3, Max: 98.7 (04-28 @ 20:55)  HR: 75 (64 - 75)  BP: 150/70 (104/89 - 150/70)  BP(mean): --  RR: 17 (17 - 18)  SpO2: 98% (98% - 100%)    I&O's Summary  I & Os for 24h ending 29 Apr 2017 07:00  =============================================  IN: 700 ml / OUT: 0 ml / NET: 700 ml    I & Os for current day (as of 29 Apr 2017 09:41)  =============================================  IN: 420 ml / OUT: 0 ml / NET: 420 ml      Physical Exam:  General: NAD, resting comfortably  Pulmonary: normal resp effort, CTA-B  Cardiovascular: NSR  Abdominal: soft, NT/ND  Extremities: WWP, normal strength  Neuro: A/O x 3, CNs II-XII grossly intact, normal motor/sensation, no focal deficits  Pulses:   Right:                                                                          Left:  FEM [ ]2+ [ ]1+ [ ]doppler                                             FEM [ ]2+ [ ]1+ [ ]doppler    POP [ ]2+ [ ]1+ [ ]doppler                                             POP [ ]2+ [ ]1+ [ ]doppler    DP [ ]2+ [ ]1+ [ ]doppler                                                DP [ ]2+ [ ]1+ [ ]doppler  PT[ ]2+ [ ]1+ [ ]doppler                                                  PT [ ]2+ [ ]1+ [ ]doppler    dressing dry R foot    LABS:                        11.5   9.9   )-----------( 128      ( 28 Apr 2017 06:19 )             35.4     04-28    141  |  112<H>  |  28<H>  ----------------------------<  92  3.8   |  19<L>  |  1.65<H>    Ca    8.6      28 Apr 2017 06:19      PT/INR - ( 29 Apr 2017 06:08 )   PT: 12.8 sec;   INR: 1.18 ratio               CAPILLARY BLOOD GLUCOSE  118 (29 Apr 2017 08:02)  134 (28 Apr 2017 20:57)  172 (28 Apr 2017 16:56)  124 (28 Apr 2017 12:00)      RADIOLOGY & ADDITIONAL TESTS:

## 2017-04-29 NOTE — DISCHARGE NOTE ADULT - SECONDARY DIAGNOSIS.
Chronic atrial fibrillation Coronary artery disease involving native heart without angina pectoris, unspecified vessel or lesion type Type 2 diabetes mellitus with other circulatory complication Chronic kidney disease, unspecified stage PVD (peripheral vascular disease) with claudication

## 2017-04-29 NOTE — PROGRESS NOTE ADULT - ASSESSMENT
POD # 2 Amp R Hallux at [facility]. Wound looks clean. Xray clean amp site. Cult  Gram - Rods. Advise D/C home on po Doxycycline 100 mg q12h . Keep dressing clean & dry. F/U in office within 48 hrs. OK to weightbear in cast boot for necessity. THANKS

## 2017-04-29 NOTE — DISCHARGE NOTE ADULT - MEDICATION SUMMARY - MEDICATIONS TO TAKE
I will START or STAY ON the medications listed below when I get home from the hospital:    aspirin 81 mg oral delayed release tablet  -- 1 tab(s) by mouth once a day (at bedtime)  -- Indication: For Coronary artery disease    acetaminophen 325 mg oral tablet  -- 2 tab(s) by mouth every 6 hours, As needed, Moderate Pain (4 - 6)  -- Indication: For pain    quinapril 5 mg oral tablet  -- 1 tab(s) by mouth once a day  -- Indication: For Chronic systolic heart failure    mexiletine 150 mg oral capsule  -- 1 cap(s) by mouth every 12 hours  -- Indication: For Afib    digoxin 125 mcg (0.125 mg) oral tablet  -- 1 tab(s) by mouth once a day (at bedtime)  -- Indication: For Afib    warfarin 2.5 mg oral tablet  -- 1 tab(s) by mouth on Saturday and Monday   -- Indication: For Afib    warfarin 5 mg oral tablet  -- 1 tab(s) by mouth once a day on Sunday, Tuesday, Wednesday, Thursday, and Friday  -- Indication: For Afib    allopurinol 100 mg oral tablet  -- 1 tab(s) by mouth once a day (at bedtime)  -- Indication: For gout    atorvastatin  -- 5 milligram(s) by mouth every other day  -- Indication: For Hyperlipidemia    carvedilol 25 mg oral tablet  -- 1 tab(s) by mouth every 12 hours  -- Indication: For Afib    Flax Seed Oil oral capsule  -- 1 cap(s) by mouth once a day  -- Indication: For Heart failure    dorzolamide-timolol 2%-0.5% preservative-free ophthalmic solution  -- 1 drop(s) to each affected eye 2 times a day  -- Indication: For eye drops    latanoprost 0.005% ophthalmic solution  -- 1 drop(s) to each affected eye once a day (at bedtime)  -- Indication: For eye drops    pantoprazole 40 mg oral delayed release tablet  -- 1 tab(s) by mouth once a day (before a meal)  -- Indication: For reflux    Vitamin D3 2000 intl units oral capsule  -- 1 cap(s) by mouth once a day  -- Indication: For vitamins

## 2017-04-29 NOTE — DISCHARGE NOTE ADULT - CARE PROVIDERS DIRECT ADDRESSES
,DirectAddress_Unknown,DirectAddress_Unknown,[email or web address],DirectAddress_Unknown

## 2017-04-29 NOTE — DISCHARGE NOTE ADULT - PLAN OF CARE
you finished a course of antibiotics and a toe amputation. follow up with Dr. Gamble.  Continue wound care until then. continue heart meds. follow up with your cardiologist. continue previous regimen follow up with your pcp. continue home meds. follow up with your nephrologist and or pcp. follow up with Dr. Hopkins

## 2017-04-29 NOTE — DISCHARGE NOTE ADULT - CARE PROVIDER_API CALL
Norm Corona (DPLANCE), Podiatric Medicine and Surgery  [address] [place] [address]  Phone: [phone]  Fax: [phone]    Daniel Royal), Vascular Surgery  [address] [place] [address]  Phone: [phone]  Fax: [phone]    Wero Olivo (MD), Cardiovascular Disease; Critical Care Medicine; Internal Medicine  [address] [place] [address]  Phone: [phone]  Fax: [phone]

## 2017-04-29 NOTE — PROGRESS NOTE ADULT - SUBJECTIVE AND OBJECTIVE BOX
HPI: 76 y/o male with a h/o VT s/p AICD, AFib on AC, CAD s/p CABGx2, DM2, gout, glaucoma,  HLD, chronic systolic CHF, PVD, CKD 2-3,hx of vasectomy, S/p R femoral endarterectomy on 2/6/2017, came to the ED on 4/20/17 after being sent from Dr. Gamble's office for IV abx due to pain, necrosis, erythema of infected first metatarsal of right foot. Pt states the wound began approximately 3 months ago, and there has been a clear fluid drainage. Pt states he has a hx of recurrent infection in this toe. Had bypass in RLE with Dr. Royal 2 months ago which temporarily helped symptoms but symptoms have been worsening recently.   Patient is s/p Right hallux amputation with Dr Madison.    4/28- complaining of right foot pain but improved with pain meds.  4/29: Walking with PT and cane without issues. Eager to go home.    Vital Signs Last 24 Hrs  T(C): 36.3, Max: 37.1 (04-28 @ 20:55)  T(F): 97.3, Max: 98.7 (04-28 @ 20:55)  HR: 75 (64 - 75)  BP: 150/70 (104/89 - 150/70)  BP(mean): --  RR: 17 (17 - 18)  SpO2: 98% (98% - 100%)    ROS:   All 10 systems reviewed and found to be negative with the exception of what has been described above.    PE:  Constitutional: NAD, laying in bed  HEENT: NC/AT, EOMI, PERRLA  Neck: supple  Back: no tenderness  Respiratory: LCTA  Cardiovascular: S1S2 regular, no murmurs  Abdomen: soft, not tender, not distended, positive BS  Genitourinary: voiding  Rectal: deferred  Musculoskeletal: no muscle tenderness, no joint swelling or tenderness  Extremities: no pedal edema - right foot dressing intact  Neurological: no focal deficits    MEDICATIONS  (STANDING):  lactobacillus acidophilus 1Tablet(s) Oral daily  pantoprazole    Tablet 40milliGRAM(s) Oral before breakfast  mexiletine 150milliGRAM(s) Oral every 12 hours  lisinopril 5milliGRAM(s) Oral at bedtime  latanoprost 0.005% Ophthalmic Solution 1Drop(s) Left EYE at bedtime  dorzolamide 2%/timolol 0.5% Ophthalmic Solution 1Drop(s) Left EYE two times a day  digoxin     Tablet 0.125milliGRAM(s) Oral at bedtime  carvedilol 25milliGRAM(s) Oral every 12 hours  atorvastatin 5milliGRAM(s) Oral at bedtime  allopurinol 100milliGRAM(s) Oral after dinner  insulin lispro (HumaLOG) corrective regimen sliding scale  SubCutaneous at bedtime  insulin lispro (HumaLOG) corrective regimen sliding scale  SubCutaneous three times a day before meals  dextrose 50% Injectable 25Gram(s) IV Push once  dextrose 50% Injectable 25Gram(s) IV Push once  dextrose 50% Injectable 12.5Gram(s) IV Push once  aspirin enteric coated 81milliGRAM(s) Oral at bedtime  heparin  Injectable 5000Unit(s) SubCutaneous every 12 hours  docusate sodium 200milliGRAM(s) Oral at bedtime  warfarin 5milliGRAM(s) Oral daily  doxycycline hyclate Capsule 100milliGRAM(s) Oral every 12 hours    MEDICATIONS  (PRN):  oxyCODONE IR 5milliGRAM(s) Oral every 4 hours PRN For moderate pain  ondansetron Injectable 4milliGRAM(s) IV Push once PRN Nausea and/or Vomiting  glucagon  Injectable 1milliGRAM(s) IntraMuscular once PRN Glucose LESS THAN 70 milligrams/deciliter  dextrose Gel 1Dose(s) Oral once PRN Blood Glucose LESS THAN 70 milliGRAM(s)/deciliter  acetaminophen   Tablet. 650milliGRAM(s) Oral every 6 hours PRN Moderate Pain (4 - 6)                          11.5   9.9   )-----------( 128      ( 28 Apr 2017 06:19 )             35.4     04-28    141  |  112<H>  |  28<H>  ----------------------------<  92  3.8   |  19<L>  |  1.65<H>    Ca    8.6      28 Apr 2017 06:19      CAPILLARY BLOOD GLUCOSE  118 (29 Apr 2017 08:02)  134 (28 Apr 2017 20:57)  172 (28 Apr 2017 16:56)  124 (28 Apr 2017 12:00)      PT/INR - ( 29 Apr 2017 06:08 )   PT: 12.8 sec;   INR: 1.18 ratio         Assessment and Plan:   · Assessment		  78 yo male with PMH of VT s/p AICD, AFib on AC, CAD s/p CABGx2, DM2, gout, glaucoma,  HLD, chronic systolic CHF, PVD, CKD 2-3,hx of vasectomy, S/p R femoral endarterectomy on 2/6/2017, admitted for toe infection.    Problem/Plan - 1:  ·  Problem: Toe infection.  Plan: - Cellulitis of right hallux with gangrene/OM/necrosis S/P Right hallux amputation with Dr Corona- POD#1  - tissue culture - no organisms   - vanco and zosyn x 7 days per ID  - changed to doxy per podiatry recs (4/28)  - Blood cx neg to date  - WBC scan positive for OM of right hallux.  - Cardiac eval appreciated.    Problem/Plan - 2:  ·  Problem: Afib.  Plan: - Hx of afib.  - Continue digoxin, mexiletine, and carvedilol.  - restart coumadin for goal INR 2-3 with outpatient monitoring with cardiologist.    Problem/Plan - 3:  ·  Problem: CKD (chronic kidney disease).  Plan:  - on ACE - continue.  - Hold home lasix until outpatient follow up.      Problem/Plan - 4:  ·  Problem: Chronic systolic heart failure.  Plan: - clinically stable  - on lisinopril (quinapril at home)   - hold lasix .  - Continue digoxin, carvedilol, and mexeletine.     Problem/Plan - 5:  ·  Problem: Diabetes mellitus.    -RAISS    dvt ppx:  -heparin subc    dispo:  -home/PT.    Attending Statement:  >35 minutes spent on total encounter and discharge.; more than 50% of the visit was spent counseling and/or coordinating care by the attending physician.  Patient seen and examined with LETY Laird.  Agree with physical exam and assessment and plan.

## 2017-04-29 NOTE — DISCHARGE NOTE ADULT - PATIENT PORTAL LINK FT
“You can access the [facility] Patient Portal, offered by [facility], by registering with the following website: [email or web address]”

## 2017-04-29 NOTE — DISCHARGE NOTE ADULT - VISION (WITH CORRECTIVE LENSES IF THE PATIENT USUALLY WEARS THEM):
hx Glaucoma bilateral/Normal vision: sees adequately in most situations; can see medication labels, newsprint

## 2017-04-29 NOTE — PROGRESS NOTE ADULT - SUBJECTIVE AND OBJECTIVE BOX
Patient is a 77y old  Male who presents with a chief complaint of infected Right  great toe x 3 months (20 Apr 2017 22:41)      HPI:  77 y.o. male with PMH VT s/p AICD, AFib on AC, CAD s/p CABGx2, DM2, gout, glaucoma,  HLD, chronic systolic  CHF, PVD, CKD 2-3,hx of vasectomy  ,S/p  R femoral endarterectomy feb /6/2017   presents to ED sent from Dr. Gamble's office for IV abx c/o pain, necrosis, erythema of infected first metatarsal on right foot. +Drainage. Has hx of recurrent infection in this toe. Had bypass in RLE with Dr. Wesley which temporarily helped sx but sx have been worsening recently. States usually pulse is only detected with doppler.  Denies fever. NKDA.  Patient was seen in ED by podiatry dr Gamble Blood cx were drawn and zosyn and vanco admisnitered in ED EKG- NSR,nonspecific intraventricular block,no significant  ST/T chnages as compared to EKG done aug/2016  ROS- denies fever ,chills,chest pain ,SOB,headache,palpitations,abdominal pain,dysuria,flank pain ,vomiting,nausea ,diarrhea PMH: as above PSH:  CABG, AICD placement, vasectomy, R femoral endarterectomy  Allergy:   NKDA SH:  ex smoker, quit about 30 years ago, denies drinking alcohol (20 Apr 2017 21:57)      Allergies    No Known Allergies    Intolerances        MEDICATIONS  (STANDING):  lactobacillus acidophilus 1Tablet(s) Oral daily  pantoprazole    Tablet 40milliGRAM(s) Oral before breakfast  mexiletine 150milliGRAM(s) Oral every 12 hours  lisinopril 5milliGRAM(s) Oral at bedtime  latanoprost 0.005% Ophthalmic Solution 1Drop(s) Left EYE at bedtime  dorzolamide 2%/timolol 0.5% Ophthalmic Solution 1Drop(s) Left EYE two times a day  digoxin     Tablet 0.125milliGRAM(s) Oral at bedtime  carvedilol 25milliGRAM(s) Oral every 12 hours  atorvastatin 5milliGRAM(s) Oral at bedtime  allopurinol 100milliGRAM(s) Oral after dinner  insulin lispro (HumaLOG) corrective regimen sliding scale  SubCutaneous at bedtime  insulin lispro (HumaLOG) corrective regimen sliding scale  SubCutaneous three times a day before meals  dextrose 50% Injectable 25Gram(s) IV Push once  dextrose 50% Injectable 25Gram(s) IV Push once  dextrose 50% Injectable 12.5Gram(s) IV Push once  aspirin enteric coated 81milliGRAM(s) Oral at bedtime  heparin  Injectable 5000Unit(s) SubCutaneous every 12 hours  docusate sodium 200milliGRAM(s) Oral at bedtime  warfarin 5milliGRAM(s) Oral daily  doxycycline hyclate Capsule 100milliGRAM(s) Oral every 12 hours    MEDICATIONS  (PRN):  oxyCODONE IR 5milliGRAM(s) Oral every 4 hours PRN For moderate pain  ondansetron Injectable 4milliGRAM(s) IV Push once PRN Nausea and/or Vomiting  glucagon  Injectable 1milliGRAM(s) IntraMuscular once PRN Glucose LESS THAN 70 milligrams/deciliter  dextrose Gel 1Dose(s) OEXAM:  FOOT-RIGHT                            PROCEDURE DATE:  04/27/2017        INTERPRETATION:  EXAMINATION DATE: April 27, 2017 at 1647 hours.  CLINICAL INFORMATION: Gangrene right hallux.    TECHNIQUE: 2 views of the right foot were obtained.   COMPARISON: 4/22/2017.    IMPRESSION:    Interval resection of the right first digit at the level of the   metatarsophalangeal joint. Expected subacute postoperative changes   include soft tissue swelling and subcutaneous emphysema. Vascular   calcifications are present.              SAMI ABBOTT   This document has been electronically signed. Apr 27 2017  5:03PM            ral once PRN Blood Glucose LESS THAN 70 milliGRAM(s)/deciliter  acetaminophen   Tablet. 650milliGRAM(s) Oral every 6 hours PRN Moderate Pain (4 - 6)    Culture - Tissue with Gram Stain (04.27.17 @ 16:47)    Gram Stain:   Few polymorphonuclear leukocytes per low power field  No organisms seen    Specimen Source: .Tissue wound deep right great toe    Culture Results:   Growth in fluid media only    Culture - Surgical Swab (04.27.17 @ 16:47)    Specimen Source: .Surgical Swab wound deep right great toe    Culture Results:   Rare Gram Negative Rods        RADIOLOGY    CBC Full  -  ( 28 Apr 2017 06:19 )  WBC Count : 9.9 K/uL  Hemoglobin : 11.5 g/dL  Hematocrit : 35.4 %  Platelet Count - Automated : 128 K/uL  Mean Cell Volume : 89.4 fl  Mean Cell Hemoglobin : 29.1 pg  Mean Cell Hemoglobin Concentration : 32.5 gm/dL  Auto Neutrophil # : x  Auto Lymphocyte # : x  Auto Monocyte # : x  Auto Eosinophil # : x  Auto Basophil # : x  Auto Neutrophil % : x  Auto Lymphocyte % : x  Auto Monocyte % : x  Auto Eosinophil % : x  Auto Basophil % : x      04-28    141  |  112<H>  |  28<H>  ----------------------------<  92  3.8   |  19<L>  |  1.65<H>    Ca    8.6      28 Apr 2017 06:19

## 2017-04-29 NOTE — DISCHARGE NOTE ADULT - HOSPITAL COURSE
78 y/o male with a h/o VT s/p AICD, AFib on AC, CAD s/p CABGx2, DM2, gout, glaucoma,  HLD, chronic systolic CHF, PVD, CKD 2-3,hx of vasectomy, S/p R femoral endarterectomy on 2/6/2017, came to the ED on 4/20/17 after being sent from Dr. Gamble's office for IV abx due to pain, necrosis, erythema of infected first metatarsal of right foot. Pt states the wound began approximately 3 months ago, and there has been a clear fluid drainage. Pt states he has a hx of recurrent infection in this toe. Had bypass in RLE with Dr. Royal 2 months ago which temporarily helped symptoms but symptoms have been worsening recently.   Patient is s/p Right hallux amputation with Dr Madison and now doing well and HD stable.  He will go home with out patient PT and follow up.  He completed ~10 day course of broad spectrum antibiotics.

## 2017-04-29 NOTE — PROGRESS NOTE ADULT - SUBJECTIVE AND OBJECTIVE BOX
Patient is a 77y old  Male who presents with a chief complaint of infected Right  great toe x 3 months (20 Apr 2017 22:41)      HPI:  77 y.o. male with PMH VT s/p AICD, AFib on AC, CAD s/p CABGx2, DM2, gout, glaucoma,  HLD, chronic systolic  CHF, PVD, CKD 2-3,hx of vasectomy  ,S/p  R femoral endarterectomy feb /6/2017   presents to ED sent from Dr. Gamble's office for IV abx c/o pain, necrosis, erythema of infected first metatarsal on right foot. +Drainage. Has hx of recurrent infection in this toe. Had bypass in RLE with Dr. Wesley which temporarily helped sx but sx have been worsening recently. States usually pulse is only detected with doppler.  Denies fever. NKDA.  Patient was seen in ED by podiatry dr Gamble Blood cx were drawn and zosyn and vanco admisnitered in ED EKG- NSR,nonspecific intraventricular block,no significant  ST/T chnages as compared to EKG done aug/2016  ROS- denies fever ,chills,chest pain ,SOB,headache,palpitations,abdominal pain,dysuria,flank pain ,vomiting,nausea ,diarrhea PMH: as above PSH:  CABG, AICD placement, vasectomy, R femoral endarterectomy  Allergy:   NKDA SH:  ex smoker, quit about 30 years ago, denies drinking alcohol (20 Apr 2017 21:57)  4/29- ambulating in room    PAST MEDICAL & SURGICAL HISTORY:  CAD (coronary artery disease)  Diabetes: controlled by diet  Glaucoma  Afib  Hyperlipidemia  PVD (peripheral vascular disease) with claudication  CKD (chronic kidney disease)  Hypertension  Coronary artery disease  Diabetes mellitus  Heart failure  H/O vasectomy  S/P CABG x 2  AICD (automatic cardioverter/defibrillator) present: 2005 and replaced 2015        MEDICATIONS  (STANDING):  lactobacillus acidophilus 1Tablet(s) Oral daily  pantoprazole    Tablet 40milliGRAM(s) Oral before breakfast  mexiletine 150milliGRAM(s) Oral every 12 hours  lisinopril 5milliGRAM(s) Oral at bedtime  latanoprost 0.005% Ophthalmic Solution 1Drop(s) Left EYE at bedtime  dorzolamide 2%/timolol 0.5% Ophthalmic Solution 1Drop(s) Left EYE two times a day  digoxin     Tablet 0.125milliGRAM(s) Oral at bedtime  carvedilol 25milliGRAM(s) Oral every 12 hours  atorvastatin 5milliGRAM(s) Oral at bedtime  allopurinol 100milliGRAM(s) Oral after dinner  insulin lispro (HumaLOG) corrective regimen sliding scale  SubCutaneous at bedtime  insulin lispro (HumaLOG) corrective regimen sliding scale  SubCutaneous three times a day before meals  dextrose 50% Injectable 25Gram(s) IV Push once  dextrose 50% Injectable 25Gram(s) IV Push once  dextrose 50% Injectable 12.5Gram(s) IV Push once  aspirin enteric coated 81milliGRAM(s) Oral at bedtime  heparin  Injectable 5000Unit(s) SubCutaneous every 12 hours  docusate sodium 200milliGRAM(s) Oral at bedtime  warfarin 5milliGRAM(s) Oral daily  doxycycline hyclate Capsule 100milliGRAM(s) Oral every 12 hours    MEDICATIONS  (PRN):  oxyCODONE IR 5milliGRAM(s) Oral every 4 hours PRN For moderate pain  ondansetron Injectable 4milliGRAM(s) IV Push once PRN Nausea and/or Vomiting  glucagon  Injectable 1milliGRAM(s) IntraMuscular once PRN Glucose LESS THAN 70 milligrams/deciliter  dextrose Gel 1Dose(s) Oral once PRN Blood Glucose LESS THAN 70 milliGRAM(s)/deciliter  acetaminophen   Tablet. 650milliGRAM(s) Oral every 6 hours PRN Moderate Pain (4 - 6)          Vital Signs Last 24 Hrs  T(C): 36.3, Max: 37.1 (04-28 @ 20:55)  T(F): 97.3, Max: 98.7 (04-28 @ 20:55)  HR: 75 (64 - 75)  BP: 150/70 (104/89 - 150/70)  BP(mean): --  RR: 17 (17 - 20)  SpO2: 98% (98% - 100%)    I&O's Summary    I & Os for current day (as of 29 Apr 2017 07:39)  =============================================  IN: 700 ml / OUT: 0 ml / NET: 700 ml      PHYSICAL EXAM  General Appearance:comfortable  HEENT:   Neck:   Back:   Lungs:clear   Heart: 2/6 syst murmur apex and axilla  Abdomen:   Extremities: bandaged right foot  Skin:   Neurologic:       INTERPRETATION OF TELEMETRY:    ECG:        LABS:                          11.5   9.9   )-----------( 128      ( 28 Apr 2017 06:19 )             35.4     04-28    141  |  112<H>  |  28<H>  ----------------------------<  92  3.8   |  19<L>  |  1.65<H>    Ca    8.6      28 Apr 2017 06:19              PT/INR - ( 29 Apr 2017 06:08 )   PT: 12.8 sec;   INR: 1.18 rati     IMPRESSION:  1. PVD with osteomyelitis of right great toe. s/p amputation 4/27.  2.CAD, s/p CABG- stable  3.History of ventricular tachycardia/ICD-   4.DM  5.History of atrial flutter/fibrillation- now in sinus rhythm  PLAN:  continue lisinopril, mexiletine, carvedilol, ASA. Suggest restart of warfarin. This may continue gradually as an out patient. No cardiac contraindication to discharge.    Electronic Signatures:

## 2017-04-30 LAB
-  AMIKACIN: SIGNIFICANT CHANGE UP
-  AMIKACIN: SIGNIFICANT CHANGE UP
-  AMPICILLIN/SULBACTAM: SIGNIFICANT CHANGE UP
-  AMPICILLIN/SULBACTAM: SIGNIFICANT CHANGE UP
-  AMPICILLIN: SIGNIFICANT CHANGE UP
-  AMPICILLIN: SIGNIFICANT CHANGE UP
-  AZTREONAM: SIGNIFICANT CHANGE UP
-  AZTREONAM: SIGNIFICANT CHANGE UP
-  CEFAZOLIN: SIGNIFICANT CHANGE UP
-  CEFAZOLIN: SIGNIFICANT CHANGE UP
-  CEFEPIME: SIGNIFICANT CHANGE UP
-  CEFEPIME: SIGNIFICANT CHANGE UP
-  CEFOXITIN: SIGNIFICANT CHANGE UP
-  CEFOXITIN: SIGNIFICANT CHANGE UP
-  CEFTAZIDIME: SIGNIFICANT CHANGE UP
-  CEFTAZIDIME: SIGNIFICANT CHANGE UP
-  CEFTRIAXONE: SIGNIFICANT CHANGE UP
-  CEFTRIAXONE: SIGNIFICANT CHANGE UP
-  CIPROFLOXACIN: SIGNIFICANT CHANGE UP
-  CIPROFLOXACIN: SIGNIFICANT CHANGE UP
-  ERTAPENEM: SIGNIFICANT CHANGE UP
-  ERTAPENEM: SIGNIFICANT CHANGE UP
-  GENTAMICIN: SIGNIFICANT CHANGE UP
-  GENTAMICIN: SIGNIFICANT CHANGE UP
-  IMIPENEM: SIGNIFICANT CHANGE UP
-  IMIPENEM: SIGNIFICANT CHANGE UP
-  LEVOFLOXACIN: SIGNIFICANT CHANGE UP
-  LEVOFLOXACIN: SIGNIFICANT CHANGE UP
-  MEROPENEM: SIGNIFICANT CHANGE UP
-  MEROPENEM: SIGNIFICANT CHANGE UP
-  PIPERACILLIN/TAZOBACTAM: SIGNIFICANT CHANGE UP
-  PIPERACILLIN/TAZOBACTAM: SIGNIFICANT CHANGE UP
-  TOBRAMYCIN: SIGNIFICANT CHANGE UP
-  TOBRAMYCIN: SIGNIFICANT CHANGE UP
-  TRIMETHOPRIM/SULFAMETHOXAZOLE: SIGNIFICANT CHANGE UP
-  TRIMETHOPRIM/SULFAMETHOXAZOLE: SIGNIFICANT CHANGE UP
METHOD TYPE: SIGNIFICANT CHANGE UP
METHOD TYPE: SIGNIFICANT CHANGE UP

## 2017-05-02 DIAGNOSIS — Z79.01 LONG TERM (CURRENT) USE OF ANTICOAGULANTS: ICD-10-CM

## 2017-05-02 DIAGNOSIS — I25.10 ATHEROSCLEROTIC HEART DISEASE OF NATIVE CORONARY ARTERY WITHOUT ANGINA PECTORIS: ICD-10-CM

## 2017-05-02 DIAGNOSIS — H40.9 UNSPECIFIED GLAUCOMA: ICD-10-CM

## 2017-05-02 DIAGNOSIS — Z95.810 PRESENCE OF AUTOMATIC (IMPLANTABLE) CARDIAC DEFIBRILLATOR: ICD-10-CM

## 2017-05-02 DIAGNOSIS — I48.0 PAROXYSMAL ATRIAL FIBRILLATION: ICD-10-CM

## 2017-05-02 DIAGNOSIS — M10.9 GOUT, UNSPECIFIED: ICD-10-CM

## 2017-05-02 DIAGNOSIS — Z79.82 LONG TERM (CURRENT) USE OF ASPIRIN: ICD-10-CM

## 2017-05-02 DIAGNOSIS — L03.031 CELLULITIS OF RIGHT TOE: ICD-10-CM

## 2017-05-02 DIAGNOSIS — I96 GANGRENE, NOT ELSEWHERE CLASSIFIED: ICD-10-CM

## 2017-05-02 DIAGNOSIS — M79.89 OTHER SPECIFIED SOFT TISSUE DISORDERS: ICD-10-CM

## 2017-05-02 DIAGNOSIS — Z79.899 OTHER LONG TERM (CURRENT) DRUG THERAPY: ICD-10-CM

## 2017-05-02 DIAGNOSIS — M86.171 OTHER ACUTE OSTEOMYELITIS, RIGHT ANKLE AND FOOT: ICD-10-CM

## 2017-05-02 DIAGNOSIS — I50.22 CHRONIC SYSTOLIC (CONGESTIVE) HEART FAILURE: ICD-10-CM

## 2017-05-02 DIAGNOSIS — N18.3 CHRONIC KIDNEY DISEASE, STAGE 3 (MODERATE): ICD-10-CM

## 2017-05-02 DIAGNOSIS — E78.5 HYPERLIPIDEMIA, UNSPECIFIED: ICD-10-CM

## 2017-05-02 DIAGNOSIS — E11.9 TYPE 2 DIABETES MELLITUS WITHOUT COMPLICATIONS: ICD-10-CM

## 2017-05-02 LAB
CULTURE RESULTS: SIGNIFICANT CHANGE UP
CULTURE RESULTS: SIGNIFICANT CHANGE UP
ORGANISM # SPEC MICROSCOPIC CNT: SIGNIFICANT CHANGE UP
SPECIMEN SOURCE: SIGNIFICANT CHANGE UP
SPECIMEN SOURCE: SIGNIFICANT CHANGE UP

## 2017-05-03 DIAGNOSIS — E11.69 TYPE 2 DIABETES MELLITUS WITH OTHER SPECIFIED COMPLICATION: ICD-10-CM

## 2017-05-03 DIAGNOSIS — E11.52 TYPE 2 DIABETES MELLITUS WITH DIABETIC PERIPHERAL ANGIOPATHY WITH GANGRENE: ICD-10-CM

## 2017-05-05 LAB — SURGICAL PATHOLOGY FINAL REPORT - CH: SIGNIFICANT CHANGE UP

## 2017-05-30 ENCOUNTER — APPOINTMENT (OUTPATIENT)
Dept: ELECTROPHYSIOLOGY | Facility: CLINIC | Age: 77
End: 2017-05-30

## 2017-05-30 ENCOUNTER — NON-APPOINTMENT (OUTPATIENT)
Age: 77
End: 2017-05-30

## 2017-05-30 VITALS
DIASTOLIC BLOOD PRESSURE: 77 MMHG | WEIGHT: 153 LBS | BODY MASS INDEX: 22.66 KG/M2 | OXYGEN SATURATION: 97 % | SYSTOLIC BLOOD PRESSURE: 156 MMHG | HEART RATE: 72 BPM | HEIGHT: 69 IN

## 2017-05-30 DIAGNOSIS — I47.2 VENTRICULAR TACHYCARDIA: ICD-10-CM

## 2017-05-30 RX ORDER — FUROSEMIDE 40 MG/1
40 TABLET ORAL AS DIRECTED
Refills: 0 | Status: ACTIVE | COMMUNITY

## 2017-05-30 RX ORDER — LATANOPROST/PF 0.005 %
0.01 DROPS OPHTHALMIC (EYE)
Refills: 0 | Status: ACTIVE | COMMUNITY

## 2017-06-26 ENCOUNTER — OUTPATIENT (OUTPATIENT)
Dept: OUTPATIENT SERVICES | Facility: HOSPITAL | Age: 77
LOS: 1 days | Discharge: HOME | End: 2017-06-26
Payer: MEDICARE

## 2017-06-26 DIAGNOSIS — M86.471 CHRONIC OSTEOMYELITIS WITH DRAINING SINUS, RIGHT ANKLE AND FOOT: ICD-10-CM

## 2017-06-26 DIAGNOSIS — Z95.810 PRESENCE OF AUTOMATIC (IMPLANTABLE) CARDIAC DEFIBRILLATOR: Chronic | ICD-10-CM

## 2017-06-27 PROCEDURE — 78805: CPT | Mod: 26

## 2017-07-01 ENCOUNTER — OUTPATIENT (OUTPATIENT)
Dept: OUTPATIENT SERVICES | Facility: HOSPITAL | Age: 77
LOS: 1 days | End: 2017-07-01

## 2017-07-01 DIAGNOSIS — M86.471 CHRONIC OSTEOMYELITIS WITH DRAINING SINUS, RIGHT ANKLE AND FOOT: ICD-10-CM

## 2017-07-01 DIAGNOSIS — Z95.810 PRESENCE OF AUTOMATIC (IMPLANTABLE) CARDIAC DEFIBRILLATOR: Chronic | ICD-10-CM

## 2017-07-05 ENCOUNTER — EMERGENCY (EMERGENCY)
Facility: HOSPITAL | Age: 77
LOS: 0 days | End: 2017-07-05
Attending: EMERGENCY MEDICINE | Admitting: EMERGENCY MEDICINE
Payer: MEDICARE

## 2017-07-05 VITALS — HEIGHT: 67 IN | WEIGHT: 165.35 LBS | TEMPERATURE: 97 F

## 2017-07-05 DIAGNOSIS — Z95.810 PRESENCE OF AUTOMATIC (IMPLANTABLE) CARDIAC DEFIBRILLATOR: Chronic | ICD-10-CM

## 2017-07-05 PROCEDURE — 99284 EMERGENCY DEPT VISIT MOD MDM: CPT

## 2017-07-05 NOTE — ED ADULT NURSE REASSESSMENT NOTE - NS ED NURSE REASSESS COMMENT FT1
family at bedside w/ pt. family provided for emotional support. pt belongings given to wife. pending post mortuum care.

## 2017-07-05 NOTE — ED ADULT NURSE REASSESSMENT NOTE - NS ED NURSE REASSESS COMMENT FT1
post mortuum care provided. pt transported to [facility] w/ [facility] and tech

## 2017-07-05 NOTE — ED PROVIDER NOTE - SKIN COLOR
Sternotomy scar. Scar to right inguinal region. Chronic wound on right foot/ankle. Sternotomy scar. Scar to right inguinal region.

## 2017-07-05 NOTE — ED PROVIDER NOTE - OBJECTIVE STATEMENT
74 c/o M BIBA with C-collar in place, intubated, thumper with CPR in progress, s/p cardiac arrest approximately 20 minutes PTA. Pt was found asystolic by EMS. Pt walked outside and collapsed, which was witnessed by family. Pt was given 6 epis PTA by EMS, is cyanotic, end-tidal CO2 of 24 and is now 17. EMS noted pt has a sternotomy scar. 74 c/o M BIBA with C-collar in place, intubated, thumper with CPR in progress, s/p cardiac arrest approximately 20 minutes PTA. Pt was found asystolic by EMS. Wife notes she found him unresponsive. called son who arrived and also found him unresponsive. started CPR and called EMS. Found to be in PEA with pacemaker pacing. Pt was given 6 epis PTA by EMS, is cyanotic, end-tidal CO2 of 24 and is now 17. EMS noted pt has a sternotomy scar.

## 2017-07-05 NOTE — ED PROVIDER NOTE - PROGRESS NOTE DETAILS
Winter (scribe): Epi given 15:29. CPR resumed. Winter (scribe): CPR stopped. Pulse check at 15:31. No pulse. CPR resumed. Winter (scribe): Calcium chloride given at 15:32. Winter (scribe): CPR stopped. Pulse check at 15:33, no pulse, CPR resumed. Winter (scribe): Epi given at 15:34. Winter (scribe): Bedside US showed no contractility of heart. No pericardial effusion noted. Winter ([facility]): Time of death called at 15:36, by ED attending Dr. Kerns. Lora (scribjaren): Pt's son, daughter and wife are at bedside, informed of pt's death.

## 2017-07-07 DIAGNOSIS — I46.9 CARDIAC ARREST, CAUSE UNSPECIFIED: ICD-10-CM

## 2017-10-02 ENCOUNTER — APPOINTMENT (OUTPATIENT)
Dept: ELECTROPHYSIOLOGY | Facility: CLINIC | Age: 77
End: 2017-10-02

## 2017-12-27 NOTE — PROGRESS NOTE ADULT - PROBLEM SELECTOR PROBLEM 4
pt h/o dementia in enhancedsupervision.
PVD (peripheral vascular disease) with claudication
PVD (peripheral vascular disease) with claudication

## 2018-02-06 ENCOUNTER — APPOINTMENT (OUTPATIENT)
Dept: ELECTROPHYSIOLOGY | Facility: CLINIC | Age: 78
End: 2018-02-06

## 2018-04-23 LAB
DIGITOXIN REPORTING LIMIT: 5 NG/ML — SIGNIFICANT CHANGE UP
DIGITOXIN SERPL-MCNC: SIGNIFICANT CHANGE UP NG/ML (ref 10–30)

## 2018-12-06 NOTE — ASU PATIENT PROFILE, ADULT - TRANSFUSION PREMEDICATION REQUIRED
Prior auth for omeprazole 20mg BID initiated.  Reference number [id number].  To pharmacist for clinical review.    
unsure

## 2019-03-10 NOTE — DISCHARGE NOTE ADULT - WARFARIN/COUMADIN - COMPLIANCE
CM discussed pt with Dr Tanner Joseph  Pt not yet medically stable for discharge  Pt is a bed hold at [facility] and will return to [facility] upon discharge  Pt will need BLS transport  CM to follow  Statement Selected

## 2019-06-13 NOTE — PROGRESS NOTE ADULT - PROBLEM SELECTOR PROBLEM 4
Chronic systolic heart failure
done

## 2021-10-05 NOTE — PROVIDER CONTACT NOTE (OTHER) - DATE AND TIME:
Pt admitted to room 4256 without incident. Is alert and responsive. O2 on at 2 L/NC. Respirations non labored Occasional cough . VSS. Placed in Covid isolation . Oriented to room and call light . 31-Jan-2017 10:11

## 2022-06-09 NOTE — CONSULT NOTE ADULT - PROBLEM SELECTOR PROBLEM 10
No - the patient is unable to be screened due to medical condition
Cellulitis of great toe of right foot

## 2025-01-16 NOTE — PROGRESS NOTE ADULT - PROVIDER SPECIALTY LIST ADULT
Patient requests to speak to nurse regarding concerns in taking nalTREXone (REVIA) 50 MG tablet. She did some research and found that it should not be taken with some of her other medications   Podiatry